# Patient Record
Sex: FEMALE | Race: WHITE | Employment: FULL TIME | URBAN - METROPOLITAN AREA
[De-identification: names, ages, dates, MRNs, and addresses within clinical notes are randomized per-mention and may not be internally consistent; named-entity substitution may affect disease eponyms.]

---

## 2018-05-13 ENCOUNTER — HOSPITAL ENCOUNTER (EMERGENCY)
Age: 28
Discharge: HOME | End: 2018-05-13
Attending: EMERGENCY MEDICINE
Payer: SELF-PAY

## 2018-05-13 VITALS
TEMPERATURE: 98.2 F | OXYGEN SATURATION: 98 % | BODY MASS INDEX: 29.99 KG/M2 | WEIGHT: 180 LBS | SYSTOLIC BLOOD PRESSURE: 131 MMHG | DIASTOLIC BLOOD PRESSURE: 82 MMHG | HEART RATE: 77 BPM | HEIGHT: 65 IN | RESPIRATION RATE: 22 BRPM

## 2018-05-13 DIAGNOSIS — J45.31 MILD PERSISTENT ASTHMA WITH EXACERBATION: Primary | ICD-10-CM

## 2018-05-13 DIAGNOSIS — Z76.0 MEDICATION REFILL: ICD-10-CM

## 2018-05-13 PROCEDURE — 94640 AIRWAY INHALATION TREATMENT: CPT

## 2018-05-13 PROCEDURE — 74011636637 HC RX REV CODE- 636/637: Performed by: EMERGENCY MEDICINE

## 2018-05-13 PROCEDURE — 77030029684 HC NEB SM VOL KT MONA -A

## 2018-05-13 PROCEDURE — 74011000250 HC RX REV CODE- 250: Performed by: EMERGENCY MEDICINE

## 2018-05-13 PROCEDURE — 74011250637 HC RX REV CODE- 250/637: Performed by: EMERGENCY MEDICINE

## 2018-05-13 PROCEDURE — 99283 EMERGENCY DEPT VISIT LOW MDM: CPT

## 2018-05-13 RX ORDER — IPRATROPIUM BROMIDE AND ALBUTEROL SULFATE 2.5; .5 MG/3ML; MG/3ML
3 SOLUTION RESPIRATORY (INHALATION)
Status: COMPLETED | OUTPATIENT
Start: 2018-05-13 | End: 2018-05-13

## 2018-05-13 RX ORDER — PREDNISONE 20 MG/1
60 TABLET ORAL DAILY
Qty: 12 TAB | Refills: 0 | Status: SHIPPED | OUTPATIENT
Start: 2018-05-13 | End: 2018-05-17

## 2018-05-13 RX ORDER — PREDNISONE 20 MG/1
60 TABLET ORAL
Status: COMPLETED | OUTPATIENT
Start: 2018-05-13 | End: 2018-05-13

## 2018-05-13 RX ORDER — ALBUTEROL SULFATE 90 UG/1
1-2 AEROSOL, METERED RESPIRATORY (INHALATION)
Qty: 1 INHALER | Refills: 0 | Status: ON HOLD | OUTPATIENT
Start: 2018-05-13 | End: 2019-10-21 | Stop reason: SDUPTHER

## 2018-05-13 RX ORDER — BUDESONIDE AND FORMOTEROL FUMARATE DIHYDRATE 160; 4.5 UG/1; UG/1
2 AEROSOL RESPIRATORY (INHALATION) 2 TIMES DAILY
Status: ON HOLD | COMMUNITY
End: 2019-10-21 | Stop reason: SDUPTHER

## 2018-05-13 RX ORDER — ALBUTEROL SULFATE 90 UG/1
1 AEROSOL, METERED RESPIRATORY (INHALATION)
Status: COMPLETED | OUTPATIENT
Start: 2018-05-13 | End: 2018-05-13

## 2018-05-13 RX ADMIN — ALBUTEROL SULFATE 1 PUFF: 90 AEROSOL, METERED RESPIRATORY (INHALATION) at 06:45

## 2018-05-13 RX ADMIN — PREDNISONE 60 MG: 20 TABLET ORAL at 06:13

## 2018-05-13 RX ADMIN — IPRATROPIUM BROMIDE AND ALBUTEROL SULFATE 3 ML: .5; 3 SOLUTION RESPIRATORY (INHALATION) at 06:13

## 2018-05-13 NOTE — ED PROVIDER NOTES
HPI Comments: Pt c/o wheezing, says woke up with it 5 hours pta. Long h/o asthma, freq attacks, last 2 weeks ago. No fever. No pain inc no chest pain. No leg pain or swelling. No abd pain. No vomiting. Says sx's mild, just needs refill. Says no chance of curr preg. Patient is a 28 y.o. female presenting with shortness of breath and wheezing. Shortness of Breath   Associated symptoms include wheezing. Pertinent negatives include no fever, no cough, no chest pain, no vomiting, no abdominal pain and no rash. Wheezing    Pertinent negatives include no chest pain, no fever, no abdominal pain, no vomiting, no cough and no rash. Past Medical History:   Diagnosis Date    Asthma     Ill-defined condition     ovarian cyst    Neurological disorder     migraines       Past Surgical History:   Procedure Laterality Date    HX GYN       x 2, btl         History reviewed. No pertinent family history. Social History     Social History    Marital status:      Spouse name: N/A    Number of children: N/A    Years of education: N/A     Occupational History    Not on file. Social History Main Topics    Smoking status: Former Smoker     Packs/day: 0.50    Smokeless tobacco: Never Used    Alcohol use Yes    Drug use: No    Sexual activity: Not on file     Other Topics Concern    Not on file     Social History Narrative         ALLERGIES: Ativan [lorazepam]    Review of Systems   Constitutional: Negative for fever. HENT: Negative for congestion. Respiratory: Positive for wheezing. Negative for cough. Cardiovascular: Negative for chest pain. Gastrointestinal: Negative for abdominal pain and vomiting. Musculoskeletal: Negative for back pain. Skin: Negative for rash. Neurological: Negative for light-headedness. All other systems reviewed and are negative.       Vitals:    18 0612 18 0613 18 0630 18 0635   BP:   131/82    Pulse:       Resp: Temp:       SpO2: 97% 97% 96% 98%   Weight:       Height:                Physical Exam   Constitutional: She is oriented to person, place, and time. She appears well-developed. HENT:   Head: Normocephalic. Mouth/Throat: Oropharynx is clear and moist.   Eyes: Pupils are equal, round, and reactive to light. Neck: Normal range of motion. Cardiovascular: Normal rate and normal heart sounds. No murmur heard. Pulmonary/Chest: Effort normal. She has wheezes (+ biphasic b/l wheezing. ). She has no rales. Abdominal: Soft. There is no tenderness. Musculoskeletal: Normal range of motion. She exhibits no edema. Neurological: She is alert and oriented to person, place, and time. Skin: Skin is warm and dry. Nursing note and vitals reviewed. [facility]      ED Course       Procedures      Vitals:  Patient Vitals for the past 12 hrs:   Temp Pulse Resp BP SpO2   05/13/18 0635 - - - - 98 %   05/13/18 0630 - - - 131/82 96 %   05/13/18 0613 - - - - 97 %   05/13/18 0612 - - - - 97 %   05/13/18 0602 - - - - 96 %   05/13/18 0600 - - - (!) 124/96 -   05/13/18 0558 98.2 °F (36.8 °C) 77 22 136/89 96 %         Medications ordered:   Medications   albuterol (PROVENTIL HFA, VENTOLIN HFA, PROAIR HFA) inhaler 1 Puff (not administered)   albuterol-ipratropium (DUO-NEB) 2.5 MG-0.5 MG/3 ML (3 mL Nebulization Given 5/13/18 0613)   predniSONE (DELTASONE) tablet 60 mg (60 mg Oral Given 5/13/18 8958)         Lab findings:  No results found for this or any previous visit (from the past 12 hour(s)). X-Ray, CT or other radiology findings or impressions:  No orders to display       Progress notes, Consult notes or additional Procedure notes:   Markedly improved on recheck, cont mild wheezing but improved aeration. Af, nl vitals, nl sats. Pt declines cxr and further ed tx inc further nebs. Adamant regarding dc at this time. Detailed ret inst given. To dc per pt request.  She verbalizes und of det ret inst given. Disposition:  Diagnosis:   1. Mild persistent asthma with exacerbation    2. Medication refill        Disposition: home    Follow-up Information     Follow up With Details Comments [address] Schedule an appointment as soon as possible for a visit in 1 [address]  [phone]           Patient's Medications   Start Taking    ALBUTEROL (PROVENTIL HFA, VENTOLIN HFA, PROAIR HFA) 90 MCG/ACTUATION INHALER    Take 1-2 Puffs by inhalation every four (4) hours as needed for Wheezing. PREDNISONE (DELTASONE) 20 MG TABLET    Take 3 Tabs by mouth daily for 4 days. Continue Taking    BUDESONIDE-FORMOTEROL (SYMBICORT) 160-4.5 MCG/ACTUATION HFAA    Take 2 Puffs by inhalation two (2) times a day. These Medications have changed    No medications on file   Stop Taking    ALBUTEROL (PROVENTIL HFA, VENTOLIN HFA) 90 MCG/ACTUATION INHALER    Take 2 Puffs by inhalation every six (6) hours as needed for Wheezing. ALBUTEROL (PROVENTIL HFA, VENTOLIN HFA) 90 MCG/ACTUATION INHALER    Take 2 Puffs by inhalation every four (4) hours as needed for Wheezing. FLUTICASONE-SALMETEROL (ADVAIR DISKUS) 100-50 MCG/DOSE DISKUS INHALER    Take 1 Puff by inhalation every twelve (12) hours.

## 2018-05-13 NOTE — ED NOTES
Medication teaching given on Duo neb and Prednisone. Patient verbalized understanding of and agrees with medication treatment. Encouraged patient to voice any concerns with reassurance provided.

## 2018-05-13 NOTE — DISCHARGE INSTRUCTIONS
Return for pain, any shortness of breath, vomiting, decreased fluid intake, weakness, numbness, dizziness, or any change or concerns. Asthma in Adults: Care Instructions  Your Care Instructions    During an asthma attack, your airways swell and narrow as a reaction to certain things (triggers). This makes it hard to breathe. You may be able to prevent asthma attacks if you avoid the things that set off your asthma symptoms. Keeping your asthma under control and treating symptoms before they get bad can help you avoid severe attacks. If you can control your asthma, you may be able to do all of your normal daily activities. You may also avoid asthma attacks and trips to the hospital.  Follow-up care is a key part of your treatment and safety. Be sure to make and go to all appointments, and call your doctor if you are having problems. It's also a good idea to know your test results and keep a list of the medicines you take. How can you care for yourself at home? · Follow your asthma action plan so you can manage your symptoms at home. An asthma action plan will help you prevent and control airway reactions and will tell you what to do during an asthma attack. If you do not have an asthma action plan, work with your doctor to build one. · Take your asthma medicine exactly as prescribed. Medicine plays an important role in controlling asthma. Talk to your doctor right away if you have any questions about what to take and how to take it. ¨ Use your quick-relief medicine when you have symptoms of an attack. Quick-relief medicine often is an albuterol inhaler. Some people need to use quick-relief medicine before they exercise. ¨ Take your controller medicine every day, not just when you have symptoms. Controller medicine is usually an inhaled corticosteroid. The goal is to prevent problems before they occur. Do not use your controller medicine to try to treat an attack that has already started.  It does not work fast enough to help. ¨ If your doctor prescribed corticosteroid pills to use during an attack, take them as directed. They may take hours to work, but they may shorten the attack and help you breathe better. ¨ Keep your quick-relief medicine with you at all times. · Talk to your doctor before using other medicines. Some medicines, such as aspirin, can cause asthma attacks in some people. · Check yourself for asthma symptoms to know which step to follow in your action plan. Watch for things like being short of breath, having chest tightness, coughing, and wheezing. Also notice if symptoms wake you up at night or if you get tired quickly when you exercise. · If you have a peak flow meter, use it to check how well you are breathing. This can help you predict when an asthma attack is going to occur. Then you can take medicine to prevent the asthma attack or make it less severe. · See your doctor regularly. These visits will help you learn more about asthma and what you can do to control it. Your doctor will monitor your treatment to make sure the medicine is helping you. · Keep track of your asthma attacks and your treatment. After you have had an attack, write down what triggered it, what helped end it, and any concerns you have about your asthma action plan. Take your diary when you see your doctor. You can then review your asthma action plan and decide if it is working. · Do not smoke or allow others to smoke around you. Avoid smoky places. Smoking makes asthma worse. If you need help quitting, talk to your doctor about stop-smoking programs and medicines. These can increase your chances of quitting for good. · Learn what triggers an asthma attack for you, and avoid the triggers when you can. Common triggers include colds, smoke, air pollution, dust, pollen, mold, pets, cockroaches, stress, and cold air. · Avoid colds and the flu. Get a pneumococcal vaccine shot.  If you have had one before, ask your doctor whether you need a second dose. Get a flu vaccine every fall. If you must be around people with colds or the flu, wash your hands often. When should you call for help? Call 911 anytime you think you may need emergency care. For example, call if:  ? · You have severe trouble breathing. ?Call your doctor now or seek immediate medical care if:  ? · Your symptoms do not get better after you have followed your asthma action plan. ? · You cough up yellow, dark brown, or bloody mucus (sputum). ? Watch closely for changes in your health, and be sure to contact your doctor if:  ? · Your coughing and wheezing get worse. ? · You need to use quick-relief medicine on more than 2 days a week (unless it is just for exercise). ? · You need help figuring out what is triggering your asthma attacks. Where can you learn more? Go to http://emilia-teresa.info/. Enter P597 in the search box to learn more about \"Asthma in Adults: Care Instructions. \"  Current as of: May 12, 2017  Content Version: 11.4  © 4918-9185 [employer]. Care instructions adapted under license by [employer] (which disclaims liability or warranty for this information). If you have questions about a medical condition or this instruction, always ask your healthcare professional. [address] any warranty or liability for your use of this information.

## 2018-05-13 NOTE — ED TRIAGE NOTES
Pt. Reports wheezing and asthma problem since 1 am this morning, she also reports a cough since 1 am. She states she has been having trouble breathing.

## 2018-05-13 NOTE — ED NOTES
Patient greeted / introduced myself as their primary nurse. Encouraged to voice any concerns, and all questions/concerns addressed. Assessment in progress. Explanation and teaching of all care given,  including any pending orders or procedures. Patient verbalized understanding of 'Plan of Care' and what the goals are (comfort goal reached). Vital signs, history, and home medications reviewed. Call bell with reach. Awaiting further MD orders at this time. Patient refusing chest xray and states \"I just want a breathing treatment\".

## 2019-06-27 ENCOUNTER — HOSPITAL ENCOUNTER (EMERGENCY)
Age: 29
Discharge: HOME | End: 2019-06-27
Attending: EMERGENCY MEDICINE
Payer: MEDICAID

## 2019-06-27 VITALS
SYSTOLIC BLOOD PRESSURE: 119 MMHG | BODY MASS INDEX: 28.32 KG/M2 | HEIGHT: 65 IN | HEART RATE: 87 BPM | RESPIRATION RATE: 18 BRPM | DIASTOLIC BLOOD PRESSURE: 74 MMHG | WEIGHT: 170 LBS | TEMPERATURE: 98.8 F | OXYGEN SATURATION: 100 %

## 2019-06-27 DIAGNOSIS — H92.03 PAIN IN EAR, BILATERAL: Primary | ICD-10-CM

## 2019-06-27 DIAGNOSIS — S00.412A ABRASION OF LEFT EAR CANAL, INITIAL ENCOUNTER: ICD-10-CM

## 2019-06-27 DIAGNOSIS — H61.23 BILATERAL IMPACTED CERUMEN: ICD-10-CM

## 2019-06-27 PROCEDURE — 74011250637 HC RX REV CODE- 250/637: Performed by: PHYSICIAN ASSISTANT

## 2019-06-27 PROCEDURE — 75810000150 HC RMVL IMPACTED CERUMEN 1 / 2

## 2019-06-27 PROCEDURE — 77030015919

## 2019-06-27 PROCEDURE — 99283 EMERGENCY DEPT VISIT LOW MDM: CPT

## 2019-06-27 RX ORDER — OFLOXACIN 3 MG/ML
5 SOLUTION AURICULAR (OTIC) DAILY
Qty: 5 ML | Refills: 0 | Status: SHIPPED | OUTPATIENT
Start: 2019-06-27 | End: 2019-08-14

## 2019-06-27 RX ORDER — IBUPROFEN 600 MG/1
TABLET ORAL
Status: DISCONTINUED
Start: 2019-06-27 | End: 2019-06-27 | Stop reason: HOSPADM

## 2019-06-27 RX ORDER — IBUPROFEN 600 MG/1
600 TABLET ORAL
Qty: 20 TAB | Refills: 0 | Status: SHIPPED | OUTPATIENT
Start: 2019-06-27 | End: 2019-08-14

## 2019-06-27 RX ORDER — IBUPROFEN 600 MG/1
600 TABLET ORAL
Status: COMPLETED | OUTPATIENT
Start: 2019-06-27 | End: 2019-06-27

## 2019-06-27 RX ADMIN — IBUPROFEN 600 MG: 600 TABLET ORAL at 01:38

## 2019-06-27 NOTE — DISCHARGE INSTRUCTIONS
Patient Education        Earache: Care Instructions  Your Care Instructions    Even though infection is a common cause of ear pain, not all ear pain means an infection. If you have ear pain and don't have an infection, it could be because of a jaw problem, such as temporomandibular joint (TMJ) pain. Or it could be because of a neck problem. When ear discomfort or pain is mild or comes and goes without other symptoms, home treatment may be all you need. Follow-up care is a key part of your treatment and safety. Be sure to make and go to all appointments, and call your doctor if you are having problems. It's also a good idea to know your test results and keep a list of the medicines you take. How can you care for yourself at home? · Apply heat on the ear to ease pain. To apply heat, put a warm water bottle, a heating pad set on low, or a warm cloth on your ear. Do not go to sleep with a heating pad on your skin. · Take an over-the-counter pain medicine, such as acetaminophen (Tylenol), ibuprofen (Advil, Motrin), or naproxen (Aleve). Be safe with medicines. Read and follow all instructions on the label. · Do not take two or more pain medicines at the same time unless the doctor told you to. Many pain medicines have acetaminophen, which is Tylenol. Too much acetaminophen (Tylenol) can be harmful. · Never insert anything, such as a cotton swab or a shirin pin, into the ear. When should you call for help? Call your doctor now or seek immediate medical care if:    · You have new or worse symptoms of infection, such as:  ? Increased pain, swelling, warmth, or redness. ? Red streaks leading from the area. ? Pus draining from the area. ? A fever.    Watch closely for changes in your health, and be sure to contact your doctor if:    · You have new or worse discharge coming from the ear.     · You do not get better as expected. Where can you learn more? Go to http://emilia-teresa.info/.   Enter C902 in the search box to learn more about \"Earache: Care Instructions. \"  Current as of: March 27, 2018  Content Version: 11.9  © 1266-9234 [employer]. Care instructions adapted under license by [employer] (which disclaims liability or warranty for this information). If you have questions about a medical condition or this instruction, always ask your healthcare professional. [address]  any warranty or liability for your use of this information. Patient Education        Earwax Blockage: Care Instructions  Your Care Instructions    Earwax is a natural substance that protects the ear canal. Normally, earwax drains from the ears and does not cause problems. Sometimes earwax builds up and hardens. Earwax blockage (also called cerumen impaction) can cause some loss of hearing and pain. When wax is tightly packed, you will need to have your doctor remove it. Follow-up care is a key part of your treatment and safety. Be sure to make and go to all appointments, and call your doctor if you are having problems. It's also a good idea to know your test results and keep a list of the medicines you take. How can you care for yourself at home? · Do not try to remove earwax with cotton swabs, fingers, or other objects. This can make the blockage worse and damage the eardrum. · If your doctor recommends that you try to remove earwax at home:  ? Soften and loosen the earwax with warm mineral oil. You also can try hydrogen peroxide mixed with an equal amount of room temperature water. Place 2 drops of the fluid, warmed to body temperature, in the ear two times a day for up to 5 days. ? Once the wax is loose and soft, all that is usually needed to remove it from the ear canal is a gentle, warm shower. Direct the water into the ear, then tip your head to let the earwax drain out. Dry your ear thoroughly with a hair dryer set on low.  Hold the dryer several inches from your ear.  ? If the warm mineral oil and shower do not work, use an over-the-counter wax softener. Read and follow all instructions on the label. After using the wax softener, use an ear syringe to gently flush the ear. Make sure the flushing solution is body temperature. Cool or hot fluids in the ear can cause dizziness. When should you call for help? Call your doctor now or seek immediate medical care if:    · Pus or blood drains from your ear.     · Your ears are ringing or feel full.     · You have a loss of hearing.    Watch closely for changes in your health, and be sure to contact your doctor if:    · You have pain or reduced hearing after 1 week of home treatment.     · You have any new symptoms, such as nausea or balance problems. Where can you learn more? Go to http://emilia-teresa.info/. Enter C306 in the search box to learn more about \"Earwax Blockage: Care Instructions. \"  Current as of: September 23, 2018  Content Version: 11.9  © 2374-8027 [employer]. Care instructions adapted under license by [employer] (which disclaims liability or warranty for this information). If you have questions about a medical condition or this instruction, always ask your healthcare professional. [place] [address] any warranty or liability for your use of this information.

## 2019-06-27 NOTE — ED PROVIDER NOTES
EMERGENCY DEPARTMENT HISTORY AND PHYSICAL EXAM    Date: 2019  Patient Name: Olman Faulkner    History of Presenting Illness     Time Seen:12:57 AM    No chief complaint on file. History Provided By: Patient    Additional History (Context):   Olman Faulkner is a 26 y.o. female presents emergency room with complaints of right ear pain. Pain is been going on for 4 days. Constant pain. Unable to sleep secondary to the pain. She complains of a lot of pressure in her ear. Similar issues in the past related to earwax. She is been using over-the-counter products to help resolve her earwax issues but continues to have problems. No bleeding or drainage from the ears. Afebrile. No nasal congestion or drainage. Decreased hearing. PCP: Kika Campbell MD    Current Outpatient Medications   Medication Sig Dispense Refill    ipratropium-albuterol (COMBIVENT RESPIMAT)  mcg/actuation inhaler Take 1 Puff by inhalation every six (6) hours.  ofloxacin (FLOXIN) 0.3 % otic solution Administer 5 Drops into each ear daily. 5 mL 0    ibuprofen (MOTRIN) 600 mg tablet Take 1 Tab by mouth every eight (8) hours as needed for Pain. Indications: Pain 20 Tab 0    budesonide-formoterol (SYMBICORT) 160-4.5 mcg/actuation HFAA Take 2 Puffs by inhalation two (2) times a day.  albuterol (PROVENTIL HFA, VENTOLIN HFA, PROAIR HFA) 90 mcg/actuation inhaler Take 1-2 Puffs by inhalation every four (4) hours as needed for Wheezing. 1 Inhaler 0       Past History     Past Medical History:  Past Medical History:   Diagnosis Date    Asthma     Ill-defined condition     ovarian cyst    Neurological disorder     migraines       Past Surgical History:  Past Surgical History:   Procedure Laterality Date    HX GYN       x 2, btl       Family History:  History reviewed. No pertinent family history.     Social History:  Social History     Tobacco Use    Smoking status: Current Every Day Smoker     Packs/day: 0.50    Smokeless tobacco: Never Used   Substance Use Topics    Alcohol use: Yes     Comment: rarely    Drug use: No       Allergies: Allergies   Allergen Reactions    Latex Rash    Ativan [Lorazepam] Other (comments)     Aggitation    Zoloft [Sertraline] Rash         Review of Systems   Review of Systems   HENT: Positive for ear pain and hearing loss. Negative for congestion, ear discharge, postnasal drip, rhinorrhea, sinus pressure, sinus pain, sore throat and tinnitus. Neurological: Negative for headaches. All other systems reviewed and are negative. Physical Exam     Vitals:    06/27/19 0039   BP: 119/74   Pulse: 87   Resp: 18   Temp: 98.8 °F (37.1 °C)   SpO2: 100%   Weight: 77.1 kg (170 lb)   Height: 5' 5\" (1.651 m)     Physical Exam   Constitutional: She is oriented to person, place, and time. She appears well-developed and well-nourished. She is cooperative. She does not appear ill. No distress. HENT:   Nose: Nose normal.   Mouth/Throat: Oropharynx is clear and moist and mucous membranes are normal.   Unable to visualize tympanic membranes bilaterally due to cerumen impaction. Able to visualize a hair in the ear canal of the right ear. Hair seems to be embedded or growing towards the beginning of the ear canal.    Patient does have some pain in the right ear with traction. There is no swelling externally. Left ear normal other than cerumen impaction. Eyes: Pupils are equal, round, and reactive to light. Conjunctivae are normal.   Neck: Neck supple. Cardiovascular: Normal rate. Pulmonary/Chest: Effort normal and breath sounds normal.   Lymphadenopathy:     She has no cervical adenopathy. Neurological: She is alert and oriented to person, place, and time. Skin: Skin is warm and dry. Psychiatric: She has a normal mood and affect. Nursing note and vitals reviewed.       Nursing note and vitals reviewed         Diagnostic Study Results     Labs -   No results found for this or any previous visit (from the past 12 hour(s)). Radiologic Studies   No orders to display     CT Results  (Last 48 hours)    None        CXR Results  (Last 48 hours)    None            Medical Decision Making   I am the first provider for this patient. I reviewed the vital signs, available nursing notes, past medical history, past surgical history, family history and social history. Vital Signs-Reviewed the patient's vital signs. Records Reviewed: Nursing Notes    DDX: Bilateral cerumen impaction, possible otitis externa, otitis media, ear canal irritation secondary to chronic medic [employer] use    Provider Notes:   26 y.o. female presents emergency room with right ear pain. On examination has bilateral ear cerumen impaction. Multiple attempts for irrigation were unsuccessful due to pain. Patient did suffer a small superficial abrasion to the left ear canal with irrigation. Also several attempts on both sides using cerumen scoop. Still able to remove large amounts of wax. Able to visualize the tympanic membrane on the right side. Hair still visible in the ear canal.  No signs of infection at the base of the hair. Unable to see tympanic membrane on the left ear due to cerumen impaction. Will not attempt any further removal.  Will refer on to ENT. Patient was given Motrin for pain. Procedures:  EAR CERUMEN REMOVAL NOTEWRITER (ASAP ONLY)  Date/Time: 6/27/2019 2:01 AM  Performed by: DARIA Wild  Authorized by: DARIA Wild     Consent:     Consent obtained:  Verbal    Consent given by:  Patient    Risks discussed:  Bleeding, infection, pain, TM perforation, dizziness and incomplete removal  Procedure details:     Location:  L ear and R ear    Procedure type: curette    Post-procedure details:     Hearing quality:  Diminished  Comments: The ears were first attempted to be irrigated with solution. However, patient was unable to tolerate.   She has an abrasion now to the left ear canal which could have been from the irrigation. Several attempts were done with cerumen scoop. Able to fully remove earwax on the right ear. However still unable to remove most of the wax on the left ear. Patient had too much discomfort so the procedure was terminated. ED Course:   Initial assessment performed. The patients presenting problems have been discussed, and they are in agreement with the care plan formulated and outlined with them. I have encouraged them to ask questions as they arise throughout their visit. Diagnosis and Disposition       DISCHARGE NOTE:  2:00 AM    Patricia Carrasquillo's  results have been reviewed with her. She has been counseled regarding her diagnosis, treatment, and plan. She verbally conveys understanding and agreement of the signs, symptoms, diagnosis, treatment and prognosis and additionally agrees to follow up as discussed. She also agrees with the care-plan and conveys that all of her questions have been answered. I have also provided discharge instructions for her that include: educational information regarding their diagnosis and treatment, and list of reasons why they would want to return to the ED prior to their follow-up appointment, should her condition change. She has been provided with education for proper emergency department utilization. CLINICAL IMPRESSION:    1. Pain in ear, bilateral    2. Bilateral impacted cerumen    3. Abrasion of left ear canal, initial encounter        PLAN:  1. D/C Home  2. Discharge Medication List as of 6/27/2019  1:53 AM      START taking these medications    Details   ofloxacin (FLOXIN) 0.3 % otic solution Administer 5 Drops into each ear daily. , Print, Disp-5 mL, R-0      ibuprofen (MOTRIN) 600 mg tablet Take 1 Tab by mouth every eight (8) hours as needed for Pain.  Indications: Pain, Print, Disp-20 Tab, R-0         CONTINUE these medications which have NOT CHANGED    Details   ipratropium-albuterol (COMBIVENT RESPIMAT)  mcg/actuation inhaler Take 1 Puff by inhalation every six (6) hours. , Historical Med      budesonide-formoterol (SYMBICORT) 160-4.5 mcg/actuation HFAA Take 2 Puffs by inhalation two (2) times a day., Historical Med      albuterol (PROVENTIL HFA, VENTOLIN HFA, PROAIR HFA) 90 mcg/actuation inhaler Take 1-2 Puffs by inhalation every four (4) hours as needed for Wheezing., Print, Disp-1 Inhaler, R-0           3. Follow-up Information     Follow up With Specialties Details Why Contact Info    Richard Salmeron MD Otolaryngology, Surgery Call in 1 day Call tomorrow for an appointment to be seen in the next several days to have earwax removed and ears reevaluated [address] [place] [address]      [facility] EMERGENCY DEPT Emergency Medicine  If symptoms worsen [address] [place] [phone]        ____________________________________     Please note that this dictation was completed with [employer], the computer voice recognition software. Quite often unanticipated grammatical, syntax, homophones, and other interpretive errors are inadvertently transcribed by the computer software. Please disregard these errors. Please excuse any errors that have escaped final proofreading.

## 2019-08-14 ENCOUNTER — APPOINTMENT (OUTPATIENT)
Dept: GENERAL RADIOLOGY | Age: 29
End: 2019-08-14
Attending: PHYSICIAN ASSISTANT
Payer: COMMERCIAL

## 2019-08-14 ENCOUNTER — HOSPITAL ENCOUNTER (EMERGENCY)
Age: 29
Discharge: HOME | End: 2019-08-14
Attending: EMERGENCY MEDICINE
Payer: COMMERCIAL

## 2019-08-14 VITALS
HEART RATE: 100 BPM | HEIGHT: 65 IN | BODY MASS INDEX: 28.32 KG/M2 | WEIGHT: 170 LBS | SYSTOLIC BLOOD PRESSURE: 112 MMHG | DIASTOLIC BLOOD PRESSURE: 86 MMHG | OXYGEN SATURATION: 100 % | TEMPERATURE: 98.5 F | RESPIRATION RATE: 20 BRPM

## 2019-08-14 DIAGNOSIS — J20.9 ACUTE BRONCHITIS, UNSPECIFIED ORGANISM: Primary | ICD-10-CM

## 2019-08-14 DIAGNOSIS — J45.901 ASTHMA WITH ACUTE EXACERBATION, UNSPECIFIED ASTHMA SEVERITY, UNSPECIFIED WHETHER PERSISTENT: ICD-10-CM

## 2019-08-14 PROCEDURE — 99283 EMERGENCY DEPT VISIT LOW MDM: CPT

## 2019-08-14 PROCEDURE — 94640 AIRWAY INHALATION TREATMENT: CPT

## 2019-08-14 PROCEDURE — 71046 X-RAY EXAM CHEST 2 VIEWS: CPT

## 2019-08-14 PROCEDURE — 74011636637 HC RX REV CODE- 636/637: Performed by: PHYSICIAN ASSISTANT

## 2019-08-14 PROCEDURE — 74011000250 HC RX REV CODE- 250: Performed by: PHYSICIAN ASSISTANT

## 2019-08-14 RX ORDER — PREDNISONE 20 MG/1
60 TABLET ORAL
Status: COMPLETED | OUTPATIENT
Start: 2019-08-14 | End: 2019-08-14

## 2019-08-14 RX ORDER — IPRATROPIUM BROMIDE AND ALBUTEROL SULFATE 2.5; .5 MG/3ML; MG/3ML
3 SOLUTION RESPIRATORY (INHALATION)
Status: COMPLETED | OUTPATIENT
Start: 2019-08-14 | End: 2019-08-14

## 2019-08-14 RX ORDER — PROMETHAZINE HYDROCHLORIDE, PHENYLEPHRINE HYDROCHLORIDE AND CODEINE PHOSPHATE 6.25; 5; 1 MG/5ML; MG/5ML; MG/5ML
5 SOLUTION ORAL
Qty: 120 ML | Refills: 0 | Status: SHIPPED | OUTPATIENT
Start: 2019-08-14 | End: 2019-08-17

## 2019-08-14 RX ORDER — PREDNISONE 10 MG/1
TABLET ORAL
Qty: 21 TAB | Refills: 0 | Status: SHIPPED | OUTPATIENT
Start: 2019-08-14 | End: 2019-09-09

## 2019-08-14 RX ADMIN — PREDNISONE 60 MG: 20 TABLET ORAL at 14:19

## 2019-08-14 RX ADMIN — IPRATROPIUM BROMIDE AND ALBUTEROL SULFATE 3 ML: .5; 3 SOLUTION RESPIRATORY (INHALATION) at 14:20

## 2019-08-14 NOTE — DISCHARGE INSTRUCTIONS
Patient Education        Bronchitis: Care Instructions  Your Care Instructions    Bronchitis is inflammation of the bronchial tubes, which carry air to the lungs. The tubes swell and produce mucus, or phlegm. The mucus and inflamed bronchial tubes make you cough. You may have trouble breathing. Most cases of bronchitis are caused by viruses like those that cause colds. Antibiotics usually do not help and they may be harmful. Bronchitis usually develops rapidly and lasts about 2 to 3 weeks in otherwise healthy people. Follow-up care is a key part of your treatment and safety. Be sure to make and go to all appointments, and call your doctor if you are having problems. It's also a good idea to know your test results and keep a list of the medicines you take. How can you care for yourself at home? · Take all medicines exactly as prescribed. Call your doctor if you think you are having a problem with your medicine. · Get some extra rest.  · Take an over-the-counter pain medicine, such as acetaminophen (Tylenol), ibuprofen (Advil, Motrin), or naproxen (Aleve) to reduce fever and relieve body aches. Read and follow all instructions on the label. · Do not take two or more pain medicines at the same time unless the doctor told you to. Many pain medicines have acetaminophen, which is Tylenol. Too much acetaminophen (Tylenol) can be harmful. · Take an over-the-counter cough medicine that contains dextromethorphan to help quiet a dry, hacking cough so that you can sleep. Avoid cough medicines that have more than one active ingredient. Read and follow all instructions on the label. · Breathe moist air from a humidifier, hot shower, or sink filled with hot water. The heat and moisture will thin mucus so you can cough it out. · Do not smoke. Smoking can make bronchitis worse. If you need help quitting, talk to your doctor about stop-smoking programs and medicines.  These can increase your chances of quitting for good.  When should you call for help? Call 911 anytime you think you may need emergency care. For example, call if:    · You have severe trouble breathing.    Call your doctor now or seek immediate medical care if:    · You have new or worse trouble breathing.     · You cough up dark brown or bloody mucus (sputum).     · You have a new or higher fever.     · You have a new rash.    Watch closely for changes in your health, and be sure to contact your doctor if:    · You cough more deeply or more often, especially if you notice more mucus or a change in the color of your mucus.     · You are not getting better as expected. Where can you learn more? Go to http://emilia-teresa.info/. Enter H333 in the search box to learn more about \"Bronchitis: Care Instructions. \"  Current as of: September 5, 2018  Content Version: 12.1  © 0882-2571 [employer]. Care instructions adapted under license by [employer] (which disclaims liability or warranty for this information). If you have questions about a medical condition or this instruction, always ask your healthcare professional. [address] any warranty or liability for your use of this information.

## 2019-08-14 NOTE — ED PROVIDER NOTES
EMERGENCY DEPARTMENT HISTORY AND PHYSICAL EXAM    Date: 2019  Patient Name: Kobi Cotter    History of Presenting Illness     Chief Complaint   Patient presents with    Cough         History Provided By: Patient    Kobi Cotter is a 26 y.o. female with PMHX of asthma who presents to the emergency department C/O cough. Associated sxs include wheezing, sneezing. Pt reprts long-standing history of asthma and seasonal allergies she takes Singulair and albuterol as needed. Over the last 3 days she has noticed increased cough congestion and wheezing that her albuterol does not seem to be relieving. Pt denies chest pain, fever, known sick contacts, and any other sxs or complaints. PCP: Will Witt MD    Current Outpatient Medications   Medication Sig Dispense Refill    predniSONE (STERAPRED DS) 10 mg dose pack Use as directed 21 Tab 0    promethazine-phenyleph-codeine (PHENERGAN VC WITH CODEINE) 6.25-5-10 mg/5 mL syrp Take 5 mL by mouth every six (6) hours as needed for Pain or Cough for up to 3 days. Max Daily Amount: 20 mL. 120 mL 0    ipratropium-albuterol (COMBIVENT RESPIMAT)  mcg/actuation inhaler Take 1 Puff by inhalation every six (6) hours.  budesonide-formoterol (SYMBICORT) 160-4.5 mcg/actuation HFAA Take 2 Puffs by inhalation two (2) times a day.  albuterol (PROVENTIL HFA, VENTOLIN HFA, PROAIR HFA) 90 mcg/actuation inhaler Take 1-2 Puffs by inhalation every four (4) hours as needed for Wheezing. 1 Inhaler 0       Past History     Past Medical History:  Past Medical History:   Diagnosis Date    Asthma     Ill-defined condition     ovarian cyst    Neurological disorder     migraines       Past Surgical History:  Past Surgical History:   Procedure Laterality Date    HX GYN       x 2, btl       Family History:  History reviewed. No pertinent family history.     Social History:  Social History     Tobacco Use    Smoking status: Current Every Day Smoker Packs/day: 0.50    Smokeless tobacco: Never Used   Substance Use Topics    Alcohol use: Yes     Comment: rarely    Drug use: No       Allergies: Allergies   Allergen Reactions    Latex Rash    Ativan [Lorazepam] Other (comments)     Aggitation    Zoloft [Sertraline] Rash         Review of Systems   Review of Systems   Constitutional: Negative for fever. HENT: Positive for congestion. Respiratory: Positive for cough and wheezing. Negative for shortness of breath. Cardiovascular: Negative for chest pain. All other systems reviewed and are negative. Physical Exam     Vitals:    08/14/19 1327   BP: 112/86   Pulse: 100   Resp: 20   Temp: 98.5 °F (36.9 °C)   SpO2: 100%   Weight: 77.1 kg (170 lb)   Height: 5' 5\" (1.651 m)     Physical Exam   Constitutional: She appears well-developed and well-nourished. No distress. Sitting in exam chair appears comfortable in no acute distress speaks in complete sentences   HENT:   Head: Normocephalic and atraumatic. Eyes: Pupils are equal, round, and reactive to light. Conjunctivae are normal.   Neck: Normal range of motion. Neck supple. Cardiovascular: Normal rate and regular rhythm. Pulmonary/Chest: Effort normal. No respiratory distress. She has wheezes. Musculoskeletal: Normal range of motion. Neurological: She is alert. Skin: Skin is warm and dry. Psychiatric: She has a normal mood and affect. Her behavior is normal.   Nursing note and vitals reviewed. Diagnostic Study Results     Labs -   No results found for this or any previous visit (from the past 12 hour(s)). Radiologic Studies -   XR CHEST PA LAT   Final Result   IMPRESSION:      No active cardiopulmonary disease. CT Results  (Last 48 hours)    None        CXR Results  (Last 48 hours)               08/14/19 1400  XR CHEST PA LAT Final result    Impression:  IMPRESSION:       No active cardiopulmonary disease.        Narrative:  EXAM: CHEST RADIOGRAPHS       CLINICAL INDICATION/HISTORY: cough     > Additional: None       COMPARISON: None       TECHNIQUE: Frontal and lateral views of the chest       _______________       FINDINGS:       HEART AND MEDIASTINUM: Heart size is normal.       LUNGS AND PLEURAL SPACES: No focal consolidation, effusion, or pneumothorax. BONES AND SOFT TISSUES: Unremarkable.       _______________                 Medications given in the ED-  Medications   albuterol-ipratropium (DUO-NEB) 2.5 MG-0.5 MG/3 ML (3 mL Nebulization Given 8/14/19 1420)   predniSONE (DELTASONE) tablet 60 mg (60 mg Oral Given 8/14/19 1419)         Medical Decision Making   I am the first provider for this patient. I reviewed the vital signs, available nursing notes, past medical history, past surgical history, family history and social history. Vital Signs-Reviewed the patient's vital signs. Pulse Oximetry Analysis - 100% on RA     Records Reviewed: Nursing Notes    Procedures:  Procedures    ED Course:   2:03 PM   Initial assessment performed. The patients presenting problems have been discussed, and they are in agreement with the care plan formulated and outlined with them. I have encouraged them to ask questions as they arise throughout their visit. Discussion: 26 y.o. female known history of asthma and seasonal allergies with 3-day history of coughing and increased wheezing. Patient is afebrile not hypoxic chest x-ray shows no acute process. Patient was better after neb and steroids plan for steroids and antitussives with PCP follow-up and strict return precautions discussed. Diagnosis and Disposition       DISCHARGE NOTE:  Patricia Carrasquillo's  results have been reviewed with her. She has been counseled regarding her diagnosis, treatment, and plan. She verbally conveys understanding and agreement of the signs, symptoms, diagnosis, treatment and prognosis and additionally agrees to follow up as discussed.   She also agrees with the care-plan and conveys that all of her questions have been answered. I have also provided discharge instructions for her that include: educational information regarding their diagnosis and treatment, and list of reasons why they would want to return to the ED prior to their follow-up appointment, should her condition change. She has been provided with education for proper emergency department utilization. CLINICAL IMPRESSION:    1. Acute bronchitis, unspecified organism    2. Asthma with acute exacerbation, unspecified asthma severity, unspecified whether persistent        PLAN:  1. D/C Home  2. Current Discharge Medication List      START taking these medications    Details   predniSONE (STERAPRED DS) 10 mg dose pack Use as directed  Qty: 21 Tab, Refills: 0      promethazine-phenyleph-codeine (PHENERGAN VC WITH CODEINE) 6.25-5-10 mg/5 mL syrp Take 5 mL by mouth every six (6) hours as needed for Pain or Cough for up to 3 days. Max Daily Amount: 20 mL. Qty: 120 mL, Refills: 0    Associated Diagnoses: Acute bronchitis, unspecified organism; Asthma with acute exacerbation, unspecified asthma severity, unspecified whether persistent           3. Follow-up Information     Follow up With Specialties Details Why Contact Info    Evelyn Monte MD Internal Medicine Schedule an appointment as soon as possible for a visit  [phone]  [phone]      [facility] EMERGENCY DEPT Emergency Medicine  As needed, If symptoms worsen [address] [id number]  [phone]              Please note that this dictation was completed with [employer], the computer voice recognition software. Quite often unanticipated grammatical, syntax, homophones, and other interpretive errors are inadvertently transcribed by the computer software. Please disregard these errors. Please excuse any errors that have escaped final proofreading.

## 2019-09-09 ENCOUNTER — HOSPITAL ENCOUNTER (EMERGENCY)
Age: 29
Discharge: HOME | End: 2019-09-09
Attending: EMERGENCY MEDICINE
Payer: COMMERCIAL

## 2019-09-09 VITALS
WEIGHT: 170 LBS | RESPIRATION RATE: 16 BRPM | BODY MASS INDEX: 28.29 KG/M2 | SYSTOLIC BLOOD PRESSURE: 117 MMHG | HEART RATE: 94 BPM | TEMPERATURE: 98.6 F | DIASTOLIC BLOOD PRESSURE: 81 MMHG | OXYGEN SATURATION: 98 %

## 2019-09-09 DIAGNOSIS — R51.9 RIGHT FACIAL PAIN: Primary | ICD-10-CM

## 2019-09-09 DIAGNOSIS — K08.89 DENTALGIA: ICD-10-CM

## 2019-09-09 DIAGNOSIS — K02.9 DENTAL CARIES: ICD-10-CM

## 2019-09-09 PROCEDURE — 99282 EMERGENCY DEPT VISIT SF MDM: CPT

## 2019-09-09 RX ORDER — TRAMADOL HYDROCHLORIDE 50 MG/1
50 TABLET ORAL
Qty: 10 TAB | Refills: 0 | Status: SHIPPED | OUTPATIENT
Start: 2019-09-09 | End: 2019-09-12

## 2019-09-09 RX ORDER — IBUPROFEN 800 MG/1
800 TABLET ORAL
Qty: 20 TAB | Refills: 0 | Status: SHIPPED | OUTPATIENT
Start: 2019-09-09 | End: 2019-09-16

## 2019-09-09 RX ORDER — PENICILLIN V POTASSIUM 500 MG/1
500 TABLET, FILM COATED ORAL 4 TIMES DAILY
Qty: 28 TAB | Refills: 0 | Status: SHIPPED | OUTPATIENT
Start: 2019-09-09 | End: 2019-09-12

## 2019-09-09 NOTE — ED PROVIDER NOTES
EMERGENCY DEPARTMENT HISTORY AND PHYSICAL EXAM    Date: 2019  Patient Name: Jj Cota    History of Presenting Illness     Chief Complaint   Patient presents with    Dental Pain         History Provided By: Patient    Chief Complaint: dental pain       Additional History (Context):   6:26 PM  Jj Cota is a 26 y.o. female  presents to the emergency department C/O right lower dental pain began last night. Patient reports that she corrected to 3 weeks ago. She has had some swelling to the area. No fevers. She is able to open her mouth without difficulty. States she has been calling and has not yet been able to get in with a dental clinic. PCP: Rose Marie Alford MD    Current Outpatient Medications   Medication Sig Dispense Refill    penicillin v potassium (VEETID) 500 mg tablet Take 1 Tab by mouth four (4) times daily for 7 days. 28 Tab 0    ibuprofen (MOTRIN) 800 mg tablet Take 1 Tab by mouth every six (6) hours as needed for Pain for up to 7 days. 20 Tab 0    traMADol (ULTRAM) 50 mg tablet Take 1 Tab by mouth every six (6) hours as needed for Pain for up to 3 days. Max Daily Amount: 200 mg. 10 Tab 0    ipratropium-albuterol (COMBIVENT RESPIMAT)  mcg/actuation inhaler Take 1 Puff by inhalation every six (6) hours.  budesonide-formoterol (SYMBICORT) 160-4.5 mcg/actuation HFAA Take 2 Puffs by inhalation two (2) times a day.  albuterol (PROVENTIL HFA, VENTOLIN HFA, PROAIR HFA) 90 mcg/actuation inhaler Take 1-2 Puffs by inhalation every four (4) hours as needed for Wheezing. 1 Inhaler 0       Past History     Past Medical History:  Past Medical History:   Diagnosis Date    Asthma     Ill-defined condition     ovarian cyst    Neurological disorder     migraines       Past Surgical History:  Past Surgical History:   Procedure Laterality Date    HX GYN       x 2, btl       Family History:  History reviewed. No pertinent family history.     Social History:  Social History     Tobacco Use    Smoking status: Current Every Day Smoker     Packs/day: 0.50    Smokeless tobacco: Never Used   Substance Use Topics    Alcohol use: Yes     Comment: rarely    Drug use: No       Allergies: Allergies   Allergen Reactions    Latex Rash    Ativan [Lorazepam] Other (comments)     Aggitation    Zoloft [Sertraline] Rash       Review of Systems   Review of Systems   Constitutional: Negative for chills and fever. HENT: Positive for dental problem. Respiratory: Negative for shortness of breath. Cardiovascular: Negative for chest pain. Neurological: Negative for weakness and numbness. All other systems reviewed and are negative. Physical Exam     Vitals:    09/09/19 1817   BP: 117/81   Pulse: 94   Resp: 16   Temp: 98.6 °F (37 °C)   SpO2: 98%   Weight: 77.1 kg (170 lb)     Physical Exam   Constitutional: She is oriented to person, place, and time. She appears well-developed and well-nourished. No distress. Alert, non toxic, appears slightly uncomfortable   HENT:   Head: Normocephalic and atraumatic. Right Ear: Tympanic membrane, external ear and ear canal normal.   Left Ear: Tympanic membrane, external ear and ear canal normal.   Nose: Nose normal.   Mouth/Throat: Uvula is midline, oropharynx is clear and moist and mucous membranes are normal. Mucous membranes are not dry. Abnormal dentition. No oropharyngeal exudate, posterior oropharyngeal edema, posterior oropharyngeal erythema or tonsillar abscesses. No sublingual tenderness or swelling  Able to swallow secretions  Able to open mouth fully  No trismus   No obvious facial swelling    Neck: Normal range of motion. Neck supple. Cardiovascular: Normal rate, regular rhythm and normal heart sounds. Pulmonary/Chest: Effort normal and breath sounds normal. No stridor. No respiratory distress. She has no wheezes. She has no rales. Lymphadenopathy:     She has no cervical adenopathy. Neurological: She is alert and oriented to person, place, and time. Skin: Skin is warm and dry. She is not diaphoretic. Psychiatric: She has a normal mood and affect. Judgment normal.   Nursing note and vitals reviewed. Diagnostic Study Results     Labs:   No results found for this or any previous visit (from the past 12 hour(s)). Radiologic Studies:   No orders to display     CT Results  (Last 48 hours)    None        CXR Results  (Last 48 hours)    None          Medical Decision Making   I am the first provider for this patient. I reviewed the vital signs, available nursing notes, past medical history, past surgical history, family history and social history. Vital Signs: Reviewed the patient's vital signs. Pulse Oximetry Analysis: 98% on RA       Records Reviewed: Nursing Notes and Old Medical Records    Procedures:  Procedures    ED Course:   6:26 PM Initial assessment performed. The patients presenting problems have been discussed, and they are in agreement with the care plan formulated and outlined with them. I have encouraged them to ask questions as they arise throughout their visit. Discussion:  Pt presents with right lower dental pain that began last night. She has fractured tooth. No evidence of abscess or Derrick's angina. Patient is able to open her mouth fully. Will start on penicillin and have patient follow-up with dental clinic. Strict return precautions given, pt offering no questions or complaints. Diagnosis and Disposition     DISCHARGE NOTE:  Patricia Carrasquillo's  results have been reviewed with her. She has been counseled regarding her diagnosis, treatment, and plan. She verbally conveys understanding and agreement of the signs, symptoms, diagnosis, treatment and prognosis and additionally agrees to follow up as discussed. She also agrees with the care-plan and conveys that all of her questions have been answered.   I have also provided discharge instructions for her that include: educational information regarding their diagnosis and treatment, and list of reasons why they would want to return to the ED prior to their follow-up appointment, should her condition change. She has been provided with education for proper emergency department utilization. CLINICAL IMPRESSION:    1. Right facial pain    2. Dental caries    3. Dentalgia        PLAN:  1. D/C Home  2. Current Discharge Medication List      START taking these medications    Details   penicillin v potassium (VEETID) 500 mg tablet Take 1 Tab by mouth four (4) times daily for 7 days. Qty: 28 Tab, Refills: 0      ibuprofen (MOTRIN) 800 mg tablet Take 1 Tab by mouth every six (6) hours as needed for Pain for up to 7 days. Qty: 20 Tab, Refills: 0      traMADol (ULTRAM) 50 mg tablet Take 1 Tab by mouth every six (6) hours as needed for Pain for up to 3 days. Max Daily Amount: 200 mg. Qty: 10 Tab, Refills: 0    Associated Diagnoses: Right facial pain; Dental caries; Dentalgia           3. Follow-up Information     Follow up With Specialties Details Why Contact Info    dental clinic    call for follow up      [facility] EMERGENCY DEPT Emergency Medicine  If symptoms worsen [address] [address] [phone]             Please note that this dictation was completed with [employer], the computer voice recognition software. Quite often unanticipated grammatical, syntax, homophones, and other interpretive errors are inadvertently transcribed by the computer software. Please disregard these errors. Please excuse any errors that have escaped final proofreading.

## 2019-09-09 NOTE — DISCHARGE INSTRUCTIONS
Dr. Romana Castanon, [facility] [address], [facility] 159  [address]:  330 [facility], [address]  [phone]  Melvi Byarvin, and Extractions    [facility]  [address] [facility], [address] [place]  [phone]  Melvi Byarvin, and Extractions    [facility]  [address] [facility] 159  [phone]  Fillings, Cleaning, and [address] [place]  [address] [place], 39[address] [facility]  [phone]    OFELIA KWOK [facility] Department  [address], [address]  [phone]  Ages 3-18, if attending [address], [address] [facility] [address]  [phone]  Extractions, Dany Castro, [facility]    [facility]  [address], [address]  [phone]  Oral Surgery - $70 required  Milo Gaw, Extractions - $100 Required    [address]   [address] [facility] [address], [address]  [phone]  [facility] Only    [address], 1519 [facility]  Dental Clinic Open September to June

## 2019-09-12 ENCOUNTER — HOSPITAL ENCOUNTER (EMERGENCY)
Age: 29
Discharge: HOME | End: 2019-09-12
Attending: EMERGENCY MEDICINE
Payer: COMMERCIAL

## 2019-09-12 VITALS
WEIGHT: 170 LBS | HEIGHT: 65 IN | TEMPERATURE: 98.5 F | RESPIRATION RATE: 18 BRPM | HEART RATE: 105 BPM | SYSTOLIC BLOOD PRESSURE: 149 MMHG | DIASTOLIC BLOOD PRESSURE: 113 MMHG | OXYGEN SATURATION: 100 % | BODY MASS INDEX: 28.32 KG/M2

## 2019-09-12 DIAGNOSIS — R51.9 RIGHT FACIAL PAIN: Primary | ICD-10-CM

## 2019-09-12 DIAGNOSIS — Z72.0 TOBACCO USE: ICD-10-CM

## 2019-09-12 DIAGNOSIS — K08.89 DENTALGIA: ICD-10-CM

## 2019-09-12 PROCEDURE — 99283 EMERGENCY DEPT VISIT LOW MDM: CPT

## 2019-09-12 PROCEDURE — 74011250637 HC RX REV CODE- 250/637: Performed by: PHYSICIAN ASSISTANT

## 2019-09-12 RX ORDER — CLINDAMYCIN HYDROCHLORIDE 150 MG/1
300 CAPSULE ORAL EVERY 6 HOURS
Qty: 80 CAP | Refills: 0 | Status: SHIPPED | OUTPATIENT
Start: 2019-09-12 | End: 2019-09-22

## 2019-09-12 RX ORDER — HYDROCODONE BITARTRATE AND ACETAMINOPHEN 5; 325 MG/1; MG/1
2 TABLET ORAL
Status: COMPLETED | OUTPATIENT
Start: 2019-09-12 | End: 2019-09-12

## 2019-09-12 RX ADMIN — HYDROCODONE BITARTRATE AND ACETAMINOPHEN 2 TABLET: 5; 325 TABLET ORAL at 18:57

## 2019-09-12 NOTE — ED PROVIDER NOTES
EMERGENCY DEPARTMENT HISTORY AND PHYSICAL EXAM    Date: 9/12/2019  Patient Name: Don Molina    History of Presenting Illness     Chief Complaint   Patient presents with    Dental Pain         History Provided By: Patient    Chief Complaint: right facial pain    HPI(Context):   6:43 PM  Don Molina is a 26 y.o. female with PMHX of tobacco use who presents to the emergency department C/O right facial pain. Associated sxs include right facial swelling. Pt reports chipped tooth at site of pain. Sxs x 4 days. Reports right lower jaw. Pt was seen at this facility for same this week. Rx PCN, tramadol, and motrin. No relief. Pt seen at [place] [id number] ED for same. Told to continue medicine and see dentist. Pt has not called dentist as she cannot find one that takes her insurance. Pt is active smoker. Pt denies fever, chills, and any other sxs or complaints. PCP: Favio Duff MD    Current Facility-Administered Medications   Medication Dose Route Frequency Provider Last Rate Last Dose    HYDROcodone-acetaminophen (NORCO) 5-325 mg per tablet 2 Tab  2 Tab Oral NOW Denver Stokes PA-C         Current Outpatient Medications   Medication Sig Dispense Refill    clindamycin (CLEOCIN) 150 mg capsule Take 2 Caps by mouth every six (6) hours for 10 days. 80 Cap 0    ibuprofen (MOTRIN) 800 mg tablet Take 1 Tab by mouth every six (6) hours as needed for Pain for up to 7 days. 20 Tab 0    traMADol (ULTRAM) 50 mg tablet Take 1 Tab by mouth every six (6) hours as needed for Pain for up to 3 days. Max Daily Amount: 200 mg. 10 Tab 0    ipratropium-albuterol (COMBIVENT RESPIMAT)  mcg/actuation inhaler Take 1 Puff by inhalation every six (6) hours.  budesonide-formoterol (SYMBICORT) 160-4.5 mcg/actuation HFAA Take 2 Puffs by inhalation two (2) times a day.  albuterol (PROVENTIL HFA, VENTOLIN HFA, PROAIR HFA) 90 mcg/actuation inhaler Take 1-2 Puffs by inhalation every four (4) hours as needed for Wheezing.  1 Inhaler 0       Past History     Past Medical History:  Past Medical History:   Diagnosis Date    Asthma     Ill-defined condition     ovarian cyst    Neurological disorder     migraines       Past Surgical History:  Past Surgical History:   Procedure Laterality Date    HX GYN       x 2, btl       Family History:  History reviewed. No pertinent family history. Social History:  Social History     Tobacco Use    Smoking status: Current Every Day Smoker     Packs/day: 0.50    Smokeless tobacco: Never Used   Substance Use Topics    Alcohol use: Yes     Comment: rarely    Drug use: No       Allergies: Allergies   Allergen Reactions    Latex Rash    Ativan [Lorazepam] Other (comments)     Aggitation    Zoloft [Sertraline] Rash         Review of Systems   Review of Systems   Constitutional: Negative for chills and fever. HENT: Positive for dental problem and facial swelling. Gastrointestinal: Negative for nausea and vomiting. All other systems reviewed and are negative. Physical Exam     Vitals:    19 1754   BP: (!) 149/113   Pulse: (!) 105   Resp: 18   Temp: 98.5 °F (36.9 °C)   SpO2: 100%   Weight: 77.1 kg (170 lb)   Height: 5' 5\" (1.651 m)     Physical Exam   Constitutional: She is oriented to person, place, and time. She appears well-developed and well-nourished. No distress.  female in NAD. Alert. Anxious and tearful. HENT:   Head: Normocephalic and atraumatic. Right Ear: External ear normal.   Left Ear: External ear normal.   Nose: Nose normal. No mucosal edema or rhinorrhea. Mouth/Throat: Uvula is midline, oropharynx is clear and moist and mucous membranes are normal. No oral lesions. No trismus in the jaw. Dental caries present. No dental abscesses or uvula swelling. No oropharyngeal exudate, posterior oropharyngeal edema, posterior oropharyngeal erythema or tonsillar abscesses. Eyes: Conjunctivae are normal.   Neck: Normal range of motion. Cardiovascular: Normal rate, regular rhythm, normal heart sounds and intact distal pulses. Exam reveals no gallop and no friction rub. No murmur heard. Pulmonary/Chest: Effort normal and breath sounds normal. No accessory muscle usage. No tachypnea. She has no decreased breath sounds. She has no rhonchi. Musculoskeletal: Normal range of motion. Neurological: She is alert and oriented to person, place, and time. Skin: Skin is warm and dry. She is not diaphoretic. Psychiatric: She has a normal mood and affect. Judgment normal.   Nursing note and vitals reviewed. Diagnostic Study Results     Labs -   No results found for this or any previous visit (from the past 12 hour(s)). No orders to display     CT Results  (Last 48 hours)    None        CXR Results  (Last 48 hours)    None          Medications given in the ED-  Medications   HYDROcodone-acetaminophen (NORCO) 5-325 mg per tablet 2 Tab (has no administration in time range)         Medical Decision Making   I am the first provider for this patient. I reviewed the vital signs, available nursing notes, past medical history, past surgical history, family history and social history. Vital Signs-Reviewed the patient's vital signs. Pulse Oximetry Analysis - 100% on RA     Records Reviewed: Nursing Notes    Provider Notes (Medical Decision Making): dental caries, dental fx, dental abscess, TMJ, sinusitis, OM    Procedures:  Procedures    ED Course:   6:43 PM Initial assessment performed. The patients presenting problems have been discussed, and they are in agreement with the care plan formulated and outlined with them. I have encouraged them to ask questions as they arise throughout their visit. Diagnosis and Disposition       No drainable abscess. Stop PCN. Start clinda. FU with dentist. Alva Michel oral surgery FU as well. Smoking cessation discussed. Reasons to RTED discussed with pt. All questions answered.  Pt feels comfortable going home at this time. Pt expressed understanding and she agrees with plan. SMOKING CESSATION:  6:47 PM   The patient was counseled on the dangers of tobacco use, and was advised to quit. Reviewed strategies to maximize success, including removing cigarettes and smoking materials from environment and written materials. Discussion took 3-5 minutes, and pt expressed understanding. 1. Right facial pain    2. Dentalgia    3. Tobacco use        PLAN:  1. D/C Home  2. Current Discharge Medication List      START taking these medications    Details   clindamycin (CLEOCIN) 150 mg capsule Take 2 Caps by mouth every six (6) hours for 10 days. Qty: 80 Cap, Refills: 0         STOP taking these medications       penicillin v potassium (VEETID) 500 mg tablet Comments:   Reason for Stopping:             3.   Follow-up Information     Follow up With Specialties Details Why Contact Info    Roxy Webster DMD Oral Surgery   [address] [id number] [address] EMERGENCY DEPT Emergency Medicine  As needed, If symptoms worsen [address] [place] [address] [phone]        _______________________________    Attestations: This note is prepared by Pina Reyez PA-C.  _______________________________      Please note that this dictation was completed with [employer], the computer voice recognition software. Quite often unanticipated grammatical, syntax, homophones, and other interpretive errors are inadvertently transcribed by the computer software. Please disregard these errors. Please excuse any errors that have escaped final proofreading.

## 2019-09-12 NOTE — DISCHARGE INSTRUCTIONS
Pt. Advised of A/P and the need to follow-up with the Dentist      Dr. Mc Bone, [address] [facility] 159  [address]:  330 [facility], [address]  [phone]  Lisa Beery, and Extractions    [facility]  [address] [facility], [address] [place]  [phone]  Lisa Beery, and Extractions    [facility]  [address] [facility] [address]  [phone]  Fillings, Cleaning, and [address] [place]  [address] [place], 3947 [facility]  [phone]    OFELIA JOSIE [facility] Department  7501 [facility] [address] [facility], [address]  [phone]  Ages 3-18, if attending [facility]  [address] [facility], [address]  [phone]  Extractions, Dany Castro, [facility]    [facility] [employer] [place]  [address], [address] [facility] [address]  [phone]  Oral Surgery - $70 required  Raphael Figueroa, Extractions - $100 Required    [address]   [address] [facility] [address], [address]  [phone]  [facility] Only    [address], 1519 [facility]  Dental Clinic Open September to June

## 2019-09-15 ENCOUNTER — HOSPITAL ENCOUNTER (EMERGENCY)
Age: 29
Discharge: HOME | End: 2019-09-15
Attending: EMERGENCY MEDICINE | Admitting: EMERGENCY MEDICINE
Payer: COMMERCIAL

## 2019-09-15 VITALS
SYSTOLIC BLOOD PRESSURE: 126 MMHG | HEIGHT: 65 IN | HEART RATE: 103 BPM | TEMPERATURE: 98.3 F | BODY MASS INDEX: 28.32 KG/M2 | RESPIRATION RATE: 14 BRPM | DIASTOLIC BLOOD PRESSURE: 76 MMHG | OXYGEN SATURATION: 100 % | WEIGHT: 170 LBS

## 2019-09-15 DIAGNOSIS — K04.7 DENTAL INFECTION: ICD-10-CM

## 2019-09-15 DIAGNOSIS — R19.7 DIARRHEA, UNSPECIFIED TYPE: ICD-10-CM

## 2019-09-15 DIAGNOSIS — R11.2 NAUSEA AND VOMITING, INTRACTABILITY OF VOMITING NOT SPECIFIED, UNSPECIFIED VOMITING TYPE: Primary | ICD-10-CM

## 2019-09-15 DIAGNOSIS — R79.89 ELEVATED SERUM CREATININE: ICD-10-CM

## 2019-09-15 LAB
ALBUMIN SERPL-MCNC: 3.8 G/DL (ref 3.4–5)
ALBUMIN/GLOB SERPL: 1.2 {RATIO} (ref 0.8–1.7)
ALP SERPL-CCNC: 60 U/L (ref 45–117)
ALT SERPL-CCNC: 14 U/L (ref 13–56)
ANION GAP SERPL CALC-SCNC: 9 MMOL/L (ref 3–18)
APPEARANCE UR: ABNORMAL
AST SERPL-CCNC: 11 U/L (ref 10–38)
BACTERIA URNS QL MICRO: ABNORMAL /HPF
BASOPHILS # BLD: 0 K/UL (ref 0–0.1)
BASOPHILS NFR BLD: 0 % (ref 0–2)
BILIRUB SERPL-MCNC: 0.7 MG/DL (ref 0.2–1)
BILIRUB UR QL: NEGATIVE
BUN SERPL-MCNC: 28 MG/DL (ref 7–18)
BUN/CREAT SERPL: 16 (ref 12–20)
CALCIUM SERPL-MCNC: 9.2 MG/DL (ref 8.5–10.1)
CHLORIDE SERPL-SCNC: 108 MMOL/L (ref 100–111)
CO2 SERPL-SCNC: 24 MMOL/L (ref 21–32)
COLOR UR: YELLOW
CREAT SERPL-MCNC: 1.73 MG/DL (ref 0.6–1.3)
DIFFERENTIAL METHOD BLD: ABNORMAL
EOSINOPHIL # BLD: 0.2 K/UL (ref 0–0.4)
EOSINOPHIL NFR BLD: 1 % (ref 0–5)
EPITH CASTS URNS QL MICRO: ABNORMAL /LPF (ref 0–5)
ERYTHROCYTE [DISTWIDTH] IN BLOOD BY AUTOMATED COUNT: 12.7 % (ref 11.6–14.5)
GLOBULIN SER CALC-MCNC: 3.3 G/DL (ref 2–4)
GLUCOSE SERPL-MCNC: 91 MG/DL (ref 74–99)
GLUCOSE UR STRIP.AUTO-MCNC: NEGATIVE MG/DL
HCG UR QL: NEGATIVE
HCT VFR BLD AUTO: 45.9 % (ref 35–45)
HGB BLD-MCNC: 16.7 G/DL (ref 12–16)
HGB UR QL STRIP: ABNORMAL
KETONES UR QL STRIP.AUTO: ABNORMAL MG/DL
LEUKOCYTE ESTERASE UR QL STRIP.AUTO: NEGATIVE
LIPASE SERPL-CCNC: 35 U/L (ref 73–393)
LYMPHOCYTES # BLD: 1.9 K/UL (ref 0.9–3.6)
LYMPHOCYTES NFR BLD: 17 % (ref 21–52)
MCH RBC QN AUTO: 31.4 PG (ref 24–34)
MCHC RBC AUTO-ENTMCNC: 36.4 G/DL (ref 31–37)
MCV RBC AUTO: 86.3 FL (ref 74–97)
MONOCYTES # BLD: 1.1 K/UL (ref 0.05–1.2)
MONOCYTES NFR BLD: 10 % (ref 3–10)
NEUTS SEG # BLD: 8 K/UL (ref 1.8–8)
NEUTS SEG NFR BLD: 72 % (ref 40–73)
NITRITE UR QL STRIP.AUTO: NEGATIVE
PH UR STRIP: 5 [PH] (ref 5–8)
PLATELET # BLD AUTO: 292 K/UL (ref 135–420)
PMV BLD AUTO: 9.8 FL (ref 9.2–11.8)
POTASSIUM SERPL-SCNC: 3.8 MMOL/L (ref 3.5–5.5)
PROT SERPL-MCNC: 7.1 G/DL (ref 6.4–8.2)
PROT UR STRIP-MCNC: 300 MG/DL
RBC # BLD AUTO: 5.32 M/UL (ref 4.2–5.3)
RBC #/AREA URNS HPF: ABNORMAL /HPF (ref 0–5)
SODIUM SERPL-SCNC: 141 MMOL/L (ref 136–145)
SP GR UR REFRACTOMETRY: 1.02 (ref 1–1.03)
UROBILINOGEN UR QL STRIP.AUTO: 0.2 EU/DL (ref 0.2–1)
WBC # BLD AUTO: 11.2 K/UL (ref 4.6–13.2)
WBC URNS QL MICRO: ABNORMAL /HPF (ref 0–5)

## 2019-09-15 PROCEDURE — 96375 TX/PRO/DX INJ NEW DRUG ADDON: CPT

## 2019-09-15 PROCEDURE — 96361 HYDRATE IV INFUSION ADD-ON: CPT

## 2019-09-15 PROCEDURE — 74011250636 HC RX REV CODE- 250/636: Performed by: PHYSICIAN ASSISTANT

## 2019-09-15 PROCEDURE — 99283 EMERGENCY DEPT VISIT LOW MDM: CPT

## 2019-09-15 PROCEDURE — 83690 ASSAY OF LIPASE: CPT

## 2019-09-15 PROCEDURE — 81025 URINE PREGNANCY TEST: CPT

## 2019-09-15 PROCEDURE — 96365 THER/PROPH/DIAG IV INF INIT: CPT

## 2019-09-15 PROCEDURE — 85025 COMPLETE CBC W/AUTO DIFF WBC: CPT

## 2019-09-15 PROCEDURE — 80053 COMPREHEN METABOLIC PANEL: CPT

## 2019-09-15 PROCEDURE — 81001 URINALYSIS AUTO W/SCOPE: CPT

## 2019-09-15 RX ORDER — PENICILLIN V POTASSIUM 500 MG/1
500 TABLET, FILM COATED ORAL 4 TIMES DAILY
Qty: 28 TAB | Refills: 0 | Status: SHIPPED | OUTPATIENT
Start: 2019-09-15 | End: 2019-09-22

## 2019-09-15 RX ORDER — ONDANSETRON 2 MG/ML
4 INJECTION INTRAMUSCULAR; INTRAVENOUS
Status: COMPLETED | OUTPATIENT
Start: 2019-09-15 | End: 2019-09-15

## 2019-09-15 RX ORDER — ONDANSETRON 4 MG/1
4 TABLET, ORALLY DISINTEGRATING ORAL
Qty: 20 TAB | Refills: 0 | Status: SHIPPED | OUTPATIENT
Start: 2019-09-15 | End: 2020-02-04

## 2019-09-15 RX ORDER — CLINDAMYCIN PHOSPHATE 900 MG/50ML
900 INJECTION, SOLUTION INTRAVENOUS
Status: COMPLETED | OUTPATIENT
Start: 2019-09-15 | End: 2019-09-15

## 2019-09-15 RX ADMIN — SODIUM CHLORIDE 1000 ML: 900 INJECTION, SOLUTION INTRAVENOUS at 21:26

## 2019-09-15 RX ADMIN — CLINDAMYCIN PHOSPHATE 900 MG: 900 INJECTION, SOLUTION INTRAVENOUS at 21:26

## 2019-09-15 RX ADMIN — ONDANSETRON 4 MG: 2 INJECTION INTRAMUSCULAR; INTRAVENOUS at 21:26

## 2019-09-16 NOTE — DISCHARGE INSTRUCTIONS
Diarrhea: Care Instructions  Your Care Instructions    Diarrhea is loose, watery stools (bowel movements). The exact cause is often hard to find. Sometimes diarrhea is your body's way of getting rid of what caused an upset stomach. Viruses, food poisoning, and many medicines can cause diarrhea. Some people get diarrhea in response to emotional stress, anxiety, or certain foods. Almost everyone has diarrhea now and then. It usually isn't serious, and your stools will return to normal soon. The important thing to do is replace the fluids you have lost, so you can prevent dehydration. The doctor has checked you carefully, but problems can develop later. If you notice any problems or new symptoms, get medical treatment right away. Follow-up care is a key part of your treatment and safety. Be sure to make and go to all appointments, and call your doctor if you are having problems. It's also a good idea to know your test results and keep a list of the medicines you take. How can you care for yourself at home? · Watch for signs of dehydration, which means your body has lost too much water. Dehydration is a serious condition and should be treated right away. Signs of dehydration are:  ? Increasing thirst and dry eyes and mouth. ? Feeling faint or lightheaded. ? Darker urine, and a smaller amount of urine than normal.  · To prevent dehydration, drink plenty of fluids, enough so that your urine is light yellow or clear like water. Choose water and other caffeine-free clear liquids until you feel better. If you have kidney, heart, or liver disease and have to limit fluids, talk with your doctor before you increase the amount of fluids you drink. · Begin eating small amounts of mild foods the next day, if you feel like it. ? Try yogurt that has live cultures of Lactobacillus. (Check the label.)  ? Avoid spicy foods, fruits, alcohol, and caffeine until 48 hours after all symptoms are gone. ?  Avoid chewing gum that contains sorbitol. ? Avoid dairy products (except for yogurt with Lactobacillus) while you have diarrhea and for 3 days after symptoms are gone. · The doctor may recommend that you take over-the-counter medicine, such as loperamide (Imodium), if you still have diarrhea after 6 hours. Read and follow all instructions on the label. Do not use this medicine if you have bloody diarrhea, a high fever, or other signs of serious illness. Call your doctor if you think you are having a problem with your medicine. When should you call for help? Call 911 anytime you think you may need emergency care. For example, call if:    · You passed out (lost consciousness).     · Your stools are maroon or very bloody.    Call your doctor now or seek immediate medical care if:    · You are dizzy or lightheaded, or you feel like you may faint.     · Your stools are black and look like tar, or they have streaks of blood.     · You have new or worse belly pain.     · You have symptoms of dehydration, such as:  ? Dry eyes and a dry mouth. ? Passing only a little dark urine. ? Feeling thirstier than usual.     · You have a new or higher fever.    Watch closely for changes in your health, and be sure to contact your doctor if:    · Your diarrhea is getting worse.     · You see pus in the diarrhea.     · You are not getting better after 2 days (48 hours). Where can you learn more? Go to http://emilia-teresa.info/. Enter Z013 in the search box to learn more about \"Diarrhea: Care Instructions. \"  Current as of: September 23, 2018  Content Version: 12.1  © 5459-8606 [employer]. Care instructions adapted under license by [employer] (which disclaims liability or warranty for this information). If you have questions about a medical condition or this instruction, always ask your healthcare professional. [address] any warranty or liability for your use of this information. Nausea and Vomiting: Care Instructions  Your Care Instructions    When you are nauseated, you may feel weak and sweaty and notice a lot of saliva in your mouth. Nausea often leads to vomiting. Most of the time you do not need to worry about nausea and vomiting, but they can be signs of other illnesses. Two common causes of nausea and vomiting are stomach flu and food poisoning. Nausea and vomiting from viral stomach flu will usually start to improve within 24 hours. Nausea and vomiting from food poisoning may last from 12 to 48 hours. The doctor has checked you carefully, but problems can develop later. If you notice any problems or new symptoms, get medical treatment right away. Follow-up care is a key part of your treatment and safety. Be sure to make and go to all appointments, and call your doctor if you are having problems. It's also a good idea to know your test results and keep a list of the medicines you take. How can you care for yourself at home? · To prevent dehydration, drink plenty of fluids, enough so that your urine is light yellow or clear like water. Choose water and other caffeine-free clear liquids until you feel better. If you have kidney, heart, or liver disease and have to limit fluids, talk with your doctor before you increase the amount of fluids you drink. · Rest in bed until you feel better. · When you are able to eat, try clear soups, mild foods, and liquids until all symptoms are gone for 12 to 48 hours. Other good choices include dry toast, crackers, cooked cereal, and gelatin dessert, such as Jell-O. When should you call for help? Call 911 anytime you think you may need emergency care. For example, call if:    · You passed out (lost consciousness).    Call your doctor now or seek immediate medical care if:    · You have symptoms of dehydration, such as:  ? Dry eyes and a dry mouth. ? Passing only a little dark urine. ?  Feeling thirstier than usual.     · You have new or worsening belly pain.     · You have a new or higher fever.     · You vomit blood or what looks like coffee grounds.    Watch closely for changes in your health, and be sure to contact your doctor if:    · You have ongoing nausea and vomiting.     · Your vomiting is getting worse.     · Your vomiting lasts longer than 2 days.     · You are not getting better as expected. Where can you learn more? Go to http://emilia-teresa.info/. Enter 25 [id number] in the search box to learn more about \"Nausea and Vomiting: Care Instructions. \"  Current as of: September 23, 2018  Content Version: 12.1  © 6923-2544 [employer], [employer]. Care instructions adapted under license by [employer] (which disclaims liability or warranty for this information). If you have questions about a medical condition or this instruction, always ask your healthcare professional. [address] any warranty or liability for your use of this information.

## 2019-09-16 NOTE — ED NOTES
Provider  verbalized discharge information to pt, pt verbalized understanding. Pt in NAD at time of departure.

## 2019-09-16 NOTE — ED PROVIDER NOTES
EMERGENCY DEPARTMENT HISTORY AND PHYSICAL EXAM    Date: 9/15/2019  Patient Name: Kaitlin Foreman    History of Presenting Illness     Chief Complaint   Patient presents with    Nausea    Diarrhea       History Provided By: Patient    Chief Complaint:  Vomiting     Additional History (Context):   8:20 PM  Kaitlin Foreman is a 26 y.o. female with PMHX dental infection  presents to the emergency department C/O vomiting since yesterday and diarrhea for the past 3 hours. She is also complaining of some slight lower abdominal cramping. She has not had any fevers, urinary symptoms or any other symptoms. She does have a history of cholecystectomy. Patient was recently seen several times for dental pain in the past week. She was initially started on penicillin but had persistent swelling and pain and was switched to clindamycin which she had one day of before she began vomiting. She denies any other medical problems. PCP: Marvin Baumann MD    Current Outpatient Medications   Medication Sig Dispense Refill    ondansetron (ZOFRAN ODT) 4 mg disintegrating tablet Take 1 Tab by mouth every eight (8) hours as needed for Nausea. 20 Tab 0    penicillin v potassium (VEETID) 500 mg tablet Take 1 Tab by mouth four (4) times daily for 7 days. 28 Tab 0    clindamycin (CLEOCIN) 150 mg capsule Take 2 Caps by mouth every six (6) hours for 10 days. 80 Cap 0    ibuprofen (MOTRIN) 800 mg tablet Take 1 Tab by mouth every six (6) hours as needed for Pain for up to 7 days. 20 Tab 0    ipratropium-albuterol (COMBIVENT RESPIMAT)  mcg/actuation inhaler Take 1 Puff by inhalation every six (6) hours.  budesonide-formoterol (SYMBICORT) 160-4.5 mcg/actuation HFAA Take 2 Puffs by inhalation two (2) times a day.  albuterol (PROVENTIL HFA, VENTOLIN HFA, PROAIR HFA) 90 mcg/actuation inhaler Take 1-2 Puffs by inhalation every four (4) hours as needed for Wheezing.  1 Inhaler 0       Past History     Past Medical History:  Past Medical History:   Diagnosis Date    Asthma     Ill-defined condition     ovarian cyst    Neurological disorder     migraines       Past Surgical History:  Past Surgical History:   Procedure Laterality Date    HX GYN       x 2, btl       Family History:  History reviewed. No pertinent family history. Social History:  Social History     Tobacco Use    Smoking status: Current Every Day Smoker     Packs/day: 0.50    Smokeless tobacco: Never Used   Substance Use Topics    Alcohol use: Yes     Comment: rarely    Drug use: No       Allergies: Allergies   Allergen Reactions    Latex Rash    Ativan [Lorazepam] Other (comments)     Aggitation    Phenergan [Promethazine] Rash    Zoloft [Sertraline] Rash       Review of Systems   Review of Systems   Constitutional: Negative for chills and fever. Respiratory: Negative for shortness of breath. Cardiovascular: Negative for chest pain. Gastrointestinal: Negative for abdominal pain, diarrhea, nausea and vomiting. Genitourinary: Negative for decreased urine volume, dysuria, flank pain, frequency, hematuria, pelvic pain and urgency. Musculoskeletal: Negative for back pain. Skin: Negative for wound. Neurological: Negative for weakness and numbness. All other systems reviewed and are negative. Physical Exam     Vitals:    09/15/19 2010 09/15/19 2131 09/15/19 2234   BP: 114/85  126/76   Pulse: (!) 103     Resp: 14     Temp: 98.3 °F (36.8 °C)     SpO2: 100% 100%    Weight: 77.1 kg (170 lb)     Height: 5' 5\" (1.651 m)       Physical Exam   Constitutional: She is oriented to person, place, and time. She appears well-developed and well-nourished. No distress. Alert, nontoxic, no acute distress   HENT:   Head: Normocephalic and atraumatic.    Right Ear: Tympanic membrane, external ear and ear canal normal.   Left Ear: Tympanic membrane, external ear and ear canal normal.   Nose: Nose normal.   Mouth/Throat: Uvula is midline, oropharynx is clear and moist and mucous membranes are normal. Mucous membranes are not dry. Abnormal dentition. Dental caries present. No dental abscesses. No oropharyngeal exudate, posterior oropharyngeal edema, posterior oropharyngeal erythema or tonsillar abscesses. No sublingual tenderness or swelling  Able to swallow secretions  Able to open mouth fully  No trismus   No obvious facial swelling    Neck: Normal range of motion. Neck supple. Cardiovascular: Normal rate, regular rhythm, normal heart sounds and intact distal pulses. No murmur heard. Pulmonary/Chest: Effort normal and breath sounds normal. No stridor. No respiratory distress. She has no wheezes. She has no rales. Abdominal: Soft. Bowel sounds are normal. She exhibits no distension. There is no hepatosplenomegaly. There is tenderness in the suprapubic area. There is no rebound, no guarding and no CVA tenderness. Lymphadenopathy:     She has no cervical adenopathy. Neurological: She is alert and oriented to person, place, and time. Skin: Skin is warm and dry. She is not diaphoretic. Psychiatric: She has a normal mood and affect. Judgment normal.   Nursing note and vitals reviewed.       Diagnostic Study Results     Labs:     Recent Results (from the past 12 hour(s))   URINALYSIS W/ RFLX MICROSCOPIC    Collection Time: 09/15/19  8:15 PM   Result Value Ref Range    Color YELLOW      Appearance CLOUDY      Specific gravity 1.017 1.005 - 1.030      pH (UA) 5.0 5.0 - 8.0      Protein 300 (A) NEG mg/dL    Glucose NEGATIVE  NEG mg/dL    Ketone TRACE (A) NEG mg/dL    Bilirubin NEGATIVE  NEG      Blood SMALL (A) NEG      Urobilinogen 0.2 0.2 - 1.0 EU/dL    Nitrites NEGATIVE  NEG      Leukocyte Esterase NEGATIVE  NEG     URINE MICROSCOPIC ONLY    Collection Time: 09/15/19  8:15 PM   Result Value Ref Range    WBC 5 to 10 0 - 5 /hpf    RBC 0 to 3 0 - 5 /hpf    Epithelial cells 1+ 0 - 5 /lpf    Bacteria FEW (A) NEG /hpf   CBC WITH AUTOMATED DIFF    Collection Time: 09/15/19  9:20 PM   Result Value Ref Range    WBC 11.2 4.6 - 13.2 K/uL    RBC 5.32 (H) 4.20 - 5.30 M/uL    HGB 16.7 (H) 12.0 - 16.0 g/dL    HCT 45.9 (H) 35.0 - 45.0 %    MCV 86.3 74.0 - 97.0 FL    MCH 31.4 24.0 - 34.0 PG    MCHC 36.4 31.0 - 37.0 g/dL    RDW 12.7 11.6 - 14.5 %    PLATELET 822 288 - 510 K/uL    MPV 9.8 9.2 - 11.8 FL    NEUTROPHILS 72 40 - 73 %    LYMPHOCYTES 17 (L) 21 - 52 %    MONOCYTES 10 3 - 10 %    EOSINOPHILS 1 0 - 5 %    BASOPHILS 0 0 - 2 %    ABS. NEUTROPHILS 8.0 1.8 - 8.0 K/UL    ABS. LYMPHOCYTES 1.9 0.9 - 3.6 K/UL    ABS. MONOCYTES 1.1 0.05 - 1.2 K/UL    ABS. EOSINOPHILS 0.2 0.0 - 0.4 K/UL    ABS. BASOPHILS 0.0 0.0 - 0.1 K/UL    DF AUTOMATED     METABOLIC PANEL, COMPREHENSIVE    Collection Time: 09/15/19  9:20 PM   Result Value Ref Range    Sodium 141 136 - 145 mmol/L    Potassium 3.8 3.5 - 5.5 mmol/L    Chloride 108 100 - 111 mmol/L    CO2 24 21 - 32 mmol/L    Anion gap 9 3.0 - 18 mmol/L    Glucose 91 74 - 99 mg/dL    BUN 28 (H) 7.0 - 18 MG/DL    Creatinine 1.73 (H) 0.6 - 1.3 MG/DL    BUN/Creatinine ratio 16 12 - 20      GFR est AA 42 (L) >60 ml/min/1.73m2    GFR est non-AA 35 (L) >60 ml/min/1.73m2    Calcium 9.2 8.5 - 10.1 MG/DL    Bilirubin, total 0.7 0.2 - 1.0 MG/DL    ALT (SGPT) 14 13 - 56 U/L    AST (SGOT) 11 10 - 38 U/L    Alk. phosphatase 60 45 - 117 U/L    Protein, total 7.1 6.4 - 8.2 g/dL    Albumin 3.8 3.4 - 5.0 g/dL    Globulin 3.3 2.0 - 4.0 g/dL    A-G Ratio 1.2 0.8 - 1.7     LIPASE    Collection Time: 09/15/19  9:20 PM   Result Value Ref Range    Lipase 35 (L) 73 - 393 U/L   HCG URINE, QL. - POC    Collection Time: 09/15/19  9:30 PM   Result Value Ref Range    Pregnancy test,urine (POC) NEGATIVE  NEG         Radiologic Studies:   No orders to display     CT Results  (Last 48 hours)    None        CXR Results  (Last 48 hours)    None          Medical Decision Making   I am the first provider for this patient.     I reviewed the vital signs, available nursing notes, past medical history, past surgical history, family history and social history. Vital Signs: Reviewed the patient's vital signs. Pulse Oximetry Analysis: 100% on RA     Records Reviewed: Nursing Notes and Old Medical Records    Procedures:  Procedures    ED Course:   8:20 PM Initial assessment performed. The patients presenting problems have been discussed, and they are in agreement with the care plan formulated and outlined with them. I have encouraged them to ask questions as they arise throughout their visit. Patient requesting to drink, when RN told her she is still n.p.o. patient states she is going to drink anyway. Discussion:  Pt presents with nausea and vomiting that started yesterday with 3 episodes of diarrhea today. She was recently started on clindamycin for dental infection. Labs within normal limits with the exception of slightly elevated creatinine which is likely due to dehydration. Patient was unable to give stool sample in the ED, doubt C. difficile. Patient is requesting to be switched back to penicillin as she does not like the side effects of clindamycin and does not want the increased risk of C. difficile. Will give penicillin and have patient follow-up with dental clinic. Specific signs and symptoms for C. difficile explained and instructed patient to return if she starts experiencing any. Also advised patient to increase fluid intake and follow-up with PCP for creatinine recheck. Strict return precautions given, pt offering no questions or complaints. Diagnosis and Disposition     DISCHARGE NOTE:  Patricia Carrasquillo's  results have been reviewed with her. She has been counseled regarding her diagnosis, treatment, and plan. She verbally conveys understanding and agreement of the signs, symptoms, diagnosis, treatment and prognosis and additionally agrees to follow up as discussed.   She also agrees with the care-plan and conveys that all of her questions have been answered. I have also provided discharge instructions for her that include: educational information regarding their diagnosis and treatment, and list of reasons why they would want to return to the ED prior to their follow-up appointment, should her condition change. She has been provided with education for proper emergency department utilization. CLINICAL IMPRESSION:    1. Nausea and vomiting, intractability of vomiting not specified, unspecified vomiting type    2. Diarrhea, unspecified type    3. Dental infection    4. Elevated serum creatinine        PLAN:  1. D/C Home  2. Discharge Medication List as of 9/15/2019 10:39 PM      CONTINUE these medications which have NOT CHANGED    Details   clindamycin (CLEOCIN) 150 mg capsule Take 2 Caps by mouth every six (6) hours for 10 days. , Print, Disp-80 Cap, R-0      ibuprofen (MOTRIN) 800 mg tablet Take 1 Tab by mouth every six (6) hours as needed for Pain for up to 7 days. , Print, Disp-20 Tab, R-0      ipratropium-albuterol (COMBIVENT RESPIMAT)  mcg/actuation inhaler Take 1 Puff by inhalation every six (6) hours. , Historical Med      budesonide-formoterol (SYMBICORT) 160-4.5 mcg/actuation HFAA Take 2 Puffs by inhalation two (2) times a day., Historical Med      albuterol (PROVENTIL HFA, VENTOLIN HFA, PROAIR HFA) 90 mcg/actuation inhaler Take 1-2 Puffs by inhalation every four (4) hours as needed for Wheezing., Print, Disp-1 Inhaler, R-0           3. Follow-up Information     Follow up With Specialties Details Why Contact Info    Terrell Bustos MD Internal Medicine  Follow-up for recheck of your kidney function [address] [facility] [address] [phone]      [facility] EMERGENCY DEPT Emergency Medicine  If symptoms worsen [address]  [address] [id number] [phone]          Please note that this dictation was completed with [employer], the [employer] voice recognition software.   Quite often unanticipated grammatical, syntax, homophones, and other interpretive errors are inadvertently transcribed by the computer software. Please disregard these errors. Please excuse any errors that have escaped final proofreading.

## 2019-10-20 ENCOUNTER — HOSPITAL ENCOUNTER (INPATIENT)
Age: 29
LOS: 1 days | Discharge: LEFT BEFORE DISCHARGE | DRG: 141 | End: 2019-10-21
Attending: EMERGENCY MEDICINE | Admitting: FAMILY MEDICINE
Payer: COMMERCIAL

## 2019-10-20 ENCOUNTER — APPOINTMENT (OUTPATIENT)
Dept: GENERAL RADIOLOGY | Age: 29
DRG: 141 | End: 2019-10-20
Attending: EMERGENCY MEDICINE
Payer: COMMERCIAL

## 2019-10-20 DIAGNOSIS — R09.02 HYPOXIA: ICD-10-CM

## 2019-10-20 DIAGNOSIS — J20.9 ACUTE BRONCHITIS, UNSPECIFIED ORGANISM: ICD-10-CM

## 2019-10-20 DIAGNOSIS — F17.210 CIGARETTE NICOTINE DEPENDENCE WITHOUT COMPLICATION: Primary | ICD-10-CM

## 2019-10-20 DIAGNOSIS — J45.41 MODERATE PERSISTENT ASTHMA WITH ACUTE EXACERBATION: ICD-10-CM

## 2019-10-20 PROBLEM — J40 BRONCHITIS: Status: ACTIVE | Noted: 2019-10-20

## 2019-10-20 PROBLEM — J45.901 ASTHMA EXACERBATION: Status: ACTIVE | Noted: 2019-10-20

## 2019-10-20 PROCEDURE — 74011000250 HC RX REV CODE- 250

## 2019-10-20 PROCEDURE — 74011250636 HC RX REV CODE- 250/636: Performed by: EMERGENCY MEDICINE

## 2019-10-20 PROCEDURE — 94640 AIRWAY INHALATION TREATMENT: CPT

## 2019-10-20 PROCEDURE — 96375 TX/PRO/DX INJ NEW DRUG ADDON: CPT

## 2019-10-20 PROCEDURE — 71045 X-RAY EXAM CHEST 1 VIEW: CPT

## 2019-10-20 PROCEDURE — 65270000029 HC RM PRIVATE

## 2019-10-20 PROCEDURE — 96365 THER/PROPH/DIAG IV INF INIT: CPT

## 2019-10-20 PROCEDURE — 74011000250 HC RX REV CODE- 250: Performed by: EMERGENCY MEDICINE

## 2019-10-20 PROCEDURE — 77030013140 HC MSK NEB VYRM -A

## 2019-10-20 PROCEDURE — 99284 EMERGENCY DEPT VISIT MOD MDM: CPT

## 2019-10-20 RX ORDER — IPRATROPIUM BROMIDE AND ALBUTEROL SULFATE 2.5; .5 MG/3ML; MG/3ML
3 SOLUTION RESPIRATORY (INHALATION)
Status: COMPLETED | OUTPATIENT
Start: 2019-10-20 | End: 2019-10-20

## 2019-10-20 RX ORDER — IPRATROPIUM BROMIDE AND ALBUTEROL SULFATE 2.5; .5 MG/3ML; MG/3ML
SOLUTION RESPIRATORY (INHALATION)
Status: COMPLETED
Start: 2019-10-20 | End: 2019-10-20

## 2019-10-20 RX ORDER — ALBUTEROL SULFATE 2.5 MG/.5ML
5 SOLUTION RESPIRATORY (INHALATION)
Status: COMPLETED | OUTPATIENT
Start: 2019-10-20 | End: 2019-10-20

## 2019-10-20 RX ORDER — MAGNESIUM SULFATE 1 G/100ML
1 INJECTION INTRAVENOUS ONCE
Status: COMPLETED | OUTPATIENT
Start: 2019-10-20 | End: 2019-10-20

## 2019-10-20 RX ADMIN — MAGNESIUM SULFATE HEPTAHYDRATE 1 G: 1 INJECTION, SOLUTION INTRAVENOUS at 21:53

## 2019-10-20 RX ADMIN — IPRATROPIUM BROMIDE AND ALBUTEROL SULFATE 3 ML: .5; 3 SOLUTION RESPIRATORY (INHALATION) at 21:49

## 2019-10-20 RX ADMIN — CEFTRIAXONE 2 G: 2 INJECTION, POWDER, FOR SOLUTION INTRAMUSCULAR; INTRAVENOUS at 23:27

## 2019-10-20 RX ADMIN — IPRATROPIUM BROMIDE AND ALBUTEROL SULFATE 3 ML: .5; 3 SOLUTION RESPIRATORY (INHALATION) at 22:48

## 2019-10-20 RX ADMIN — METHYLPREDNISOLONE SODIUM SUCCINATE 125 MG: 125 INJECTION, POWDER, FOR SOLUTION INTRAMUSCULAR; INTRAVENOUS at 21:53

## 2019-10-20 RX ADMIN — IPRATROPIUM BROMIDE AND ALBUTEROL SULFATE 3 ML: 2.5; .5 SOLUTION RESPIRATORY (INHALATION) at 21:49

## 2019-10-20 RX ADMIN — AZITHROMYCIN MONOHYDRATE 500 MG: 500 INJECTION, POWDER, LYOPHILIZED, FOR SOLUTION INTRAVENOUS at 23:28

## 2019-10-20 RX ADMIN — ALBUTEROL SULFATE 5 MG: 2.5 SOLUTION RESPIRATORY (INHALATION) at 23:27

## 2019-10-21 VITALS
HEIGHT: 67 IN | DIASTOLIC BLOOD PRESSURE: 68 MMHG | TEMPERATURE: 97.8 F | BODY MASS INDEX: 26.68 KG/M2 | HEART RATE: 117 BPM | OXYGEN SATURATION: 91 % | RESPIRATION RATE: 18 BRPM | WEIGHT: 170 LBS | SYSTOLIC BLOOD PRESSURE: 124 MMHG

## 2019-10-21 LAB
ANION GAP SERPL CALC-SCNC: 9 MMOL/L (ref 3–18)
BUN SERPL-MCNC: 10 MG/DL (ref 7–18)
BUN/CREAT SERPL: 13 (ref 12–20)
CALCIUM SERPL-MCNC: 8.7 MG/DL (ref 8.5–10.1)
CHLORIDE SERPL-SCNC: 108 MMOL/L (ref 100–111)
CO2 SERPL-SCNC: 21 MMOL/L (ref 21–32)
CREAT SERPL-MCNC: 0.78 MG/DL (ref 0.6–1.3)
ERYTHROCYTE [DISTWIDTH] IN BLOOD BY AUTOMATED COUNT: 12.9 % (ref 11.6–14.5)
GLUCOSE SERPL-MCNC: 155 MG/DL (ref 74–99)
HCT VFR BLD AUTO: 42.3 % (ref 35–45)
HGB BLD-MCNC: 14.8 G/DL (ref 12–16)
MCH RBC QN AUTO: 30.9 PG (ref 24–34)
MCHC RBC AUTO-ENTMCNC: 35 G/DL (ref 31–37)
MCV RBC AUTO: 88.3 FL (ref 74–97)
PLATELET # BLD AUTO: 268 K/UL (ref 135–420)
PMV BLD AUTO: 9.5 FL (ref 9.2–11.8)
POTASSIUM SERPL-SCNC: 4.3 MMOL/L (ref 3.5–5.5)
RBC # BLD AUTO: 4.79 M/UL (ref 4.2–5.3)
SODIUM SERPL-SCNC: 138 MMOL/L (ref 136–145)
WBC # BLD AUTO: 12.3 K/UL (ref 4.6–13.2)

## 2019-10-21 PROCEDURE — 74011250636 HC RX REV CODE- 250/636: Performed by: FAMILY MEDICINE

## 2019-10-21 PROCEDURE — 80048 BASIC METABOLIC PNL TOTAL CA: CPT

## 2019-10-21 PROCEDURE — 36415 COLL VENOUS BLD VENIPUNCTURE: CPT

## 2019-10-21 PROCEDURE — 74011250637 HC RX REV CODE- 250/637: Performed by: FAMILY MEDICINE

## 2019-10-21 PROCEDURE — 74011000250 HC RX REV CODE- 250: Performed by: FAMILY MEDICINE

## 2019-10-21 PROCEDURE — 74011250636 HC RX REV CODE- 250/636: Performed by: HOSPITALIST

## 2019-10-21 PROCEDURE — 85027 COMPLETE CBC AUTOMATED: CPT

## 2019-10-21 PROCEDURE — 77030027138 HC INCENT SPIROMETER -A

## 2019-10-21 PROCEDURE — 94640 AIRWAY INHALATION TREATMENT: CPT

## 2019-10-21 RX ORDER — HEPARIN SODIUM 5000 [USP'U]/ML
5000 INJECTION, SOLUTION INTRAVENOUS; SUBCUTANEOUS EVERY 8 HOURS
Status: DISCONTINUED | OUTPATIENT
Start: 2019-10-21 | End: 2019-10-21 | Stop reason: HOSPADM

## 2019-10-21 RX ORDER — PREDNISONE 20 MG/1
TABLET ORAL
Qty: 18 TAB | Refills: 0 | Status: SHIPPED | OUTPATIENT
Start: 2019-10-21 | End: 2020-02-04

## 2019-10-21 RX ORDER — ALBUTEROL SULFATE 90 UG/1
2 AEROSOL, METERED RESPIRATORY (INHALATION)
Qty: 1 INHALER | Refills: 0 | Status: SHIPPED | OUTPATIENT
Start: 2019-10-21 | End: 2020-02-04

## 2019-10-21 RX ORDER — AMOXICILLIN AND CLAVULANATE POTASSIUM 875; 125 MG/1; MG/1
1 TABLET, FILM COATED ORAL 2 TIMES DAILY
Qty: 14 TAB | Refills: 0 | Status: SHIPPED | OUTPATIENT
Start: 2019-10-21 | End: 2020-02-04

## 2019-10-21 RX ORDER — IPRATROPIUM BROMIDE AND ALBUTEROL SULFATE 2.5; .5 MG/3ML; MG/3ML
3 SOLUTION RESPIRATORY (INHALATION)
Status: DISCONTINUED | OUTPATIENT
Start: 2019-10-21 | End: 2019-10-21 | Stop reason: HOSPADM

## 2019-10-21 RX ORDER — MONTELUKAST SODIUM 10 MG/1
10 TABLET ORAL
Status: DISCONTINUED | OUTPATIENT
Start: 2019-10-21 | End: 2019-10-21 | Stop reason: HOSPADM

## 2019-10-21 RX ORDER — ACETAMINOPHEN 325 MG/1
650 TABLET ORAL
Status: DISCONTINUED | OUTPATIENT
Start: 2019-10-21 | End: 2019-10-21 | Stop reason: HOSPADM

## 2019-10-21 RX ORDER — IPRATROPIUM BROMIDE AND ALBUTEROL SULFATE 2.5; .5 MG/3ML; MG/3ML
3 SOLUTION RESPIRATORY (INHALATION)
Qty: 30 NEBULE | Refills: 1 | Status: SHIPPED | OUTPATIENT
Start: 2019-10-21 | End: 2020-02-04

## 2019-10-21 RX ORDER — BUDESONIDE AND FORMOTEROL FUMARATE DIHYDRATE 160; 4.5 UG/1; UG/1
2 AEROSOL RESPIRATORY (INHALATION) 2 TIMES DAILY
Qty: 1 INHALER | Refills: 1 | OUTPATIENT
Start: 2019-10-21 | End: 2019-12-29

## 2019-10-21 RX ORDER — IBUPROFEN 200 MG
1 TABLET ORAL DAILY
Status: DISCONTINUED | OUTPATIENT
Start: 2019-10-21 | End: 2019-10-21 | Stop reason: HOSPADM

## 2019-10-21 RX ORDER — BUDESONIDE 0.5 MG/2ML
500 INHALANT ORAL
Status: DISCONTINUED | OUTPATIENT
Start: 2019-10-21 | End: 2019-10-21 | Stop reason: HOSPADM

## 2019-10-21 RX ORDER — HYDROCODONE BITARTRATE AND ACETAMINOPHEN 5; 325 MG/1; MG/1
1 TABLET ORAL
Status: DISCONTINUED | OUTPATIENT
Start: 2019-10-21 | End: 2019-10-21 | Stop reason: HOSPADM

## 2019-10-21 RX ADMIN — HEPARIN SODIUM 5000 UNITS: 5000 INJECTION INTRAVENOUS; SUBCUTANEOUS at 01:32

## 2019-10-21 RX ADMIN — METHYLPREDNISOLONE SODIUM SUCCINATE 60 MG: 40 INJECTION, POWDER, FOR SOLUTION INTRAMUSCULAR; INTRAVENOUS at 01:31

## 2019-10-21 RX ADMIN — MONTELUKAST 10 MG: 10 TABLET, FILM COATED ORAL at 01:32

## 2019-10-21 RX ADMIN — AZITHROMYCIN MONOHYDRATE 500 MG: 500 INJECTION, POWDER, LYOPHILIZED, FOR SOLUTION INTRAVENOUS at 10:48

## 2019-10-21 RX ADMIN — IPRATROPIUM BROMIDE AND ALBUTEROL SULFATE 3 ML: .5; 3 SOLUTION RESPIRATORY (INHALATION) at 11:26

## 2019-10-21 RX ADMIN — HEPARIN SODIUM 5000 UNITS: 5000 INJECTION INTRAVENOUS; SUBCUTANEOUS at 10:48

## 2019-10-21 RX ADMIN — METHYLPREDNISOLONE SODIUM SUCCINATE 60 MG: 40 INJECTION, POWDER, FOR SOLUTION INTRAMUSCULAR; INTRAVENOUS at 07:04

## 2019-10-21 RX ADMIN — SODIUM CHLORIDE 1000 ML: 900 INJECTION, SOLUTION INTRAVENOUS at 10:49

## 2019-10-21 RX ADMIN — IPRATROPIUM BROMIDE AND ALBUTEROL SULFATE 3 ML: .5; 3 SOLUTION RESPIRATORY (INHALATION) at 07:28

## 2019-10-21 NOTE — PROGRESS NOTES
Hospitalist Progress Note    Patient: Shari Jimenez MRN: [id number]  CSN: [id number]    YOB: 1990  Age: 26 y.o. Sex: female    DOA: 10/20/2019 LOS:  LOS: 1 day       update from nurse  Patient declined to walk test in pratt  Tele monitor states 02 sats are 84% at times    Pateint is going to sign out AMA  I recommended she stay as her 02 sats are 84% this afternoon on RA and this is abnormal and tells me she is in respiratory illness that requires acute inpatient treatment and oxygen    She states she feels fine, is leaving, and can do better at home    I told her it could become life threatening and be a danger to her life if she does not complete her treatment here  She states she knows and she \"just wants to go home. \"    I Cancelled d/c order  I will give her the rx already made but she has to sign AMA for this non-recommended discharge  Sailaja Zuniga MD

## 2019-10-21 NOTE — ED PROVIDER NOTES
EMERGENCY DEPARTMENT HISTORY AND PHYSICAL EXAM    Date: 10/20/2019  Patient Name: Angelita Dumont    History of Presenting Illness     Chief Complaint   Patient presents with    Wheezing         History Provided By: Patient    Additional History (Context):   Angelita Dumont is a 26 y.o. female with PMHX asthma, nicotine dependence presents to the emergency department C/O shortness of breath that started today. Patient also reports cough with greenish sputum. States that she used her albuterol treatment with minimal relief. Pt denies chest pain, nausea, vomiting, fever, abdominal pain, and any other sxs or complaints. PCP: Jacob Campbell MD    Current Facility-Administered Medications   Medication Dose Route Frequency Provider Last Rate Last Dose    cefTRIAXone (ROCEPHIN) 2 g in sterile water (preservative free) 20 mL IV syringe  2 g IntraVENous Q24H Shefali Leyva DO        azithromycin (ZITHROMAX) 500 mg in 0.9% sodium chloride 250 mL (VIAL-MATE)  500 mg IntraVENous Q24H Shefali Leyva DO        albuterol CONCENTRATE 2.5mg/0.5 mL neb soln  5 mg Nebulization NOW Karl LeyvasDO         Current Outpatient Medications   Medication Sig Dispense Refill    ondansetron (ZOFRAN ODT) 4 mg disintegrating tablet Take 1 Tab by mouth every eight (8) hours as needed for Nausea. 20 Tab 0    ipratropium-albuterol (COMBIVENT RESPIMAT)  mcg/actuation inhaler Take 1 Puff by inhalation every six (6) hours.  budesonide-formoterol (SYMBICORT) 160-4.5 mcg/actuation HFAA Take 2 Puffs by inhalation two (2) times a day.  albuterol (PROVENTIL HFA, VENTOLIN HFA, PROAIR HFA) 90 mcg/actuation inhaler Take 1-2 Puffs by inhalation every four (4) hours as needed for Wheezing.  1 Inhaler 0       Past History     Past Medical History:  Past Medical History:   Diagnosis Date    Asthma     Ill-defined condition     ovarian cyst    Neurological disorder     migraines Past Surgical History:  Past Surgical History:   Procedure Laterality Date    HX GYN       x 2, btl       Family History:  No family history on file. Social History:  Social History     Tobacco Use    Smoking status: Current Every Day Smoker     Packs/day: 0.50    Smokeless tobacco: Never Used   Substance Use Topics    Alcohol use: Yes     Comment: rarely    Drug use: No       Allergies: Allergies   Allergen Reactions    Latex Rash    Ativan [Lorazepam] Other (comments)     Aggitation    Phenergan [Promethazine] Rash    Zoloft [Sertraline] Rash         Review of Systems   Review of Systems   Constitutional: Negative for chills and fever. HENT: Negative for congestion, ear pain, sinus pain and sore throat. Eyes: Negative for pain and visual disturbance. Respiratory: Positive for cough, shortness of breath and wheezing. Cardiovascular: Negative for chest pain and leg swelling. Gastrointestinal: Negative for abdominal pain, constipation, diarrhea, nausea and vomiting. Genitourinary: Negative for dysuria, hematuria, vaginal bleeding and vaginal discharge. Musculoskeletal: Negative for back pain and neck pain. Skin: Negative for rash and wound. Neurological: Negative for dizziness, tremors, weakness, light-headedness and numbness. All other systems reviewed and are negative.       Physical Exam     Vitals:    10/20/19 2147 10/20/19 2201 10/20/19 2245   BP: (!) 136/104  107/58   Pulse: (!) 142     Resp: 20     Temp: 99 °F (37.2 °C)     SpO2: 94% 94% (!) 89%   Weight: 77.1 kg (170 lb)       Physical Exam    Nursing note and vitals reviewed    Constitutional: Young  female in moderate respiratory distress  Head: Normocephalic, Atraumatic  Eyes: Pupils are equal, round, and reactive to light, EOMI  Neck: Supple, non-tender  Cardiovascular: Tachycardic, no murmurs, rubs, or gallops  Chest: Tachypneic but with symmetrical chest excursion bilaterally  Lungs: Diminished breath sounds with moderate scattered expiratory wheezes  Abdomen: Soft, non tender, non distended, normoactive bowel sounds  Back: No evidence of trauma or deformity  Extremities: No evidence of trauma or deformity, no LE edema  Skin: Warm and dry, normal cap refill  Neuro: Alert and appropriate, CN intact, normal speech, moving all 4 extremities freely and symmetrically  Psychiatric: Normal mood and affect       Diagnostic Study Results     Labs -   No results found for this or any previous visit (from the past 12 hour(s)). Radiologic Studies -   XR CHEST PORT    (Results Pending)   RADIOLOGY FINDINGS  Chest x-ray shows no acute process  Pending review by Radiologist  Recorded by Jesse Leyva, DO      CT Results  (Last 48 hours)    None        CXR Results  (Last 48 hours)    None            Medical Decision Making   I am the first provider for this patient. I reviewed the vital signs, available nursing notes, past medical history, past surgical history, family history and social history. Vital Signs-Reviewed the patient's vital signs. Pulse Oximetry Analysis -94 % on room air    Records Reviewed: Nursing Notes and Old Medical Records    Provider Notes:   26 y.o. female with a history of asthma, nicotine dependence presenting with shortness of breath and wheezing. On exam in moderate respiratory distress tachypneic, tachycardic, saturating 94% on room air. Diminished breath sounds with moderate scattered expiratory wheezes. Will give duo nebs, Solu-Medrol, magnesium obtain chest x-ray. Procedures:  Procedures    ED Course:   9:43 PM   Initial assessment performed. The patients presenting problems have been discussed, and they are in agreement with the care plan formulated and outlined with them. I have encouraged them to ask questions as they arise throughout their visit. SMOKING CESSATION:  The patient was counseled on the dangers of tobacco use, and was advised to quit. Reviewed strategies to maximize success, including removing cigarettes and smoking materials from environment. Discussion took 3-5 minutes, and pt expressed understanding. 11:11 PM  On reassessment, patient hypoxic in the low 90s. Patient placed on 2 L of nasal cannula. Still persistently wheezing. Will admit. Chest x-ray showing no pneumonia however due to her history of nicotine dependence and change in her sputum production, will cover with antibiotics. Diagnosis and Disposition     11:10 PM  I have spent 75 minutes of critical care time involved in lab review, consultations with specialist, family decision-making, and documentation. During this entire length of time I was immediately available to the patient. Critical Care: The reason for providing this level of medical care for this critically ill patient was due a critical illness that impaired one or more vital organ systems such that there was a high probability of imminent or life threatening deterioration in the patients condition. This care involved high complexity decision making to assess, manipulate, and support vital system functions, to treat this degreee vital organ system failure and to prevent further life threatening deterioration of the patients condition. Core Measures:  For Hospitalized Patients:    1. Hospitalization Decision Time:  The decision to hospitalize the patient was made by Janelle Leyva DO at 11:10 PM on 10/20/2019    2. Aspirin: Aspirin was not given because the patient did not present with a stroke at the time of their Emergency Department evaluation    11:10 PM  Patient is being admitted to the hospital by Dr. Celeste Guerra. The results of their tests and reasons for their admission have been discussed with them and/or available family. They convey agreement and understanding for the need to be admitted and for their admission diagnosis.       CONDITIONS ON ADMISSION:  Sepsis is not present at the time of admission. Pneumonia is not present at the time of admission. MRSA is not present at the time of admission. Wound infection is not present at the time of admission. Pressure Ulcer is not present at the time of admission. CLINICAL IMPRESSION:    1. Cigarette nicotine dependence without complication    2. Acute bronchitis, unspecified organism    3. Moderate persistent asthma with acute exacerbation    4. Hypoxia      ____________________________________     Please note that this dictation was completed with [employer], the computer voice recognition software. Quite often unanticipated grammatical, syntax, homophones, and other interpretive errors are inadvertently transcribed by the computer software. Please disregard these errors. Please excuse any errors that have escaped final proofreading.

## 2019-10-21 NOTE — ROUTINE PROCESS
Received report alongside Dian Cisneros RN from [address]. Precepting for Dian Cisneros today.

## 2019-10-21 NOTE — PROGRESS NOTES
Reason for Admission:   SOB                   RRAT Score:   Low; 7                  Plan for utilizing home health:     unlikely                     Current Advanced Directive/Advance Care Plan:  Not on file                         Transition of Care Plan:   Physician follow up                    Chart reviewed. Per H&P \"Patricia Hubbard is a 26 y.o. female who the past medical history of moderately persistent asthma, nicotine dependence, tobacco abuse presents to the emergency room complaining of shortness of breath and wheezing that began today. She also reports approximately 2-day history of a cough productive of greenish sputum. She reports that the trigger for this asthma attack is a chest cold that began approximately a week ago. No known sick contact is her 6year-old daughter. She states that she normally takes Symbicort, Combivent and DuoNeb's at home but has run out of her Symbicort and Combivent and this is why the asthma attack has persisted. Shes denies chest pain, nausea, vomiting, diarrhea, fever, chills, night sweats, abdominal pain or flulike symptoms. Intubation history: Never been intubated. Number of ER visits for asthma in the last 6 months: 4-5 ED visits. Number of overnight admissions for asthma exacerbations this year: zero. Reports that at her PCPs office there is a pulmonologist there that she can see if it is needed, but she has not seen him recently. \"    Please encourage ambulation to assist with transition of care. No needs have been identified at this time. Care Management Interventions  PCP Verified by CM: Yes  Mode of Transport at Discharge:  Other (see comment)(Family)  Transition of Care Consult (CM Consult): Discharge Planning  Health Maintenance Reviewed: Yes  Current Support Network: Family Lives Nearby  Confirm Follow Up Transport: Self

## 2019-10-21 NOTE — PROGRESS NOTES
Pt remains MEWS 3 for elevated HR. Pt refused IV fluid bolus and IV abx approx 20 minutes after initiation. States IV site is irritated. Will not allow RN to flush, further assess, or attempt new access. States intends to receive scheduled neb, perform oxygen walk test, then leave at 1200hrs \"whether y'all are ready for me to go or not. \"  Dr. Hilda Wilkins made aware.

## 2019-10-21 NOTE — PROGRESS NOTES
Problem: General Medical Care Plan  Goal: *Vital signs within specified parameters  Outcome: Resolved/Met  Goal: *Labs within defined limits  Outcome: Resolved/Met  Goal: *Absence of infection signs and symptoms  Outcome: Resolved/Met  Goal: *Optimal pain control at patient's stated goal  Outcome: Resolved/Met  Goal: *Skin integrity maintained  Outcome: Resolved/Met  Goal: *Fluid volume balance  Outcome: Resolved/Met  Goal: *Optimize nutritional status  Outcome: Resolved/Met  Goal: *Anxiety reduced or absent  Outcome: Resolved/Met  Goal: *Progressive mobility and function (eg: ADL's)  Outcome: Resolved/Met

## 2019-10-21 NOTE — PROGRESS NOTES
Shift Summary- Patient has decided to leave AMA. Dr. Alexus Silva spoke with patient about it not being in her best interest to leave as her O2 sats have been in the 84% in the last hour. Patient states that she will be better at home and understands all the risks at this time. Dr. Alexus Silva was aware patient decided to leave and says patient may take prescriptions with her.

## 2019-10-21 NOTE — ROUTINE PROCESS
Pt refused oxygen walk test in traditional form. Stated \"I got up and walked in the room\" not in presence of RN. Per SpO2 monitor, lowest SpO2 in past hour was 84% with strong pleth. Dr. Mariposa Anguiano made aware by Rico Bloom RN and counseled patient. Pt states intent to leave AMA, stating she is best  of her condition. Per Dr. Mariposa Anguiano, pt may leave with prescriptions.

## 2019-10-21 NOTE — ED NOTES
Pt respirations rapid and unlabored. Skin w/d/p. Pt remains tachypneic and tachycardic. Asked pt again if she wanted to use the oxygen and she refused.  Pt transported to her inpatient room by hunter

## 2019-10-21 NOTE — PROGRESS NOTES
The care plan was initiated and reviewed with pt. Care plan options were discussed and questions answered. The pt  Problem: General Medical Care Plan  Goal: *Vital signs within specified parameters  Outcome: Progressing Towards Goal  Goal: *Labs within defined limits  Outcome: Progressing Towards Goal  Goal: *Absence of infection signs and symptoms  Outcome: Progressing Towards Goal  Goal: *Optimal pain control at patient's stated goal  Outcome: Progressing Towards Goal  Goal: *Skin integrity maintained  Outcome: Progressing Towards Goal  Goal: *Fluid volume balance  Outcome: Progressing Towards Goal  Goal: *Optimize nutritional status  Outcome: Progressing Towards Goal  Goal: *Anxiety reduced or absent  Outcome: Progressing Towards Goal  Goal: *Progressive mobility and function (eg: ADL's)  Outcome: Progressing Towards Goal     Problem: General Medical Care Plan  Goal: *Skin integrity maintained  Outcome: Progressing Towards Goal     Problem: General Medical Care Plan  Goal: *Labs within defined limits  Outcome: Progressing Towards Goal    understand instructions and will follow up as directed.

## 2019-10-21 NOTE — PROGRESS NOTES
0120  Admitted to room from ER. Up in room adjusting temperature. States she feels hot and cold because of the steroids. Call bell in reach. 0140  's, rasing MEWS score. Pt does not appear to be in any distress. HR elevated due to steroids and resp treatments. No chest pain, other pain. No resp distress, talking in complete sentences. States she has never been positive for c-diff, has never had any intestinal problems and C-diff positivity is from a faulty entry into her chart. [id number]  MD at bedside. 0438  Resting quietly in NAD. Refuses breathing treatment by respiratory, states she does not need one now. Wheezes remain faintly. 0630  Quietly in bed, just returned from restroom, NAD.    0720  [facility]Bedside shift change report given to LIZBETH Harding (oncoming nurse) by TENISHA Payne RN(offgoing nurse). Report included the following information Kardex, Intake/Output, MAR and Recent Results.

## 2019-10-21 NOTE — ED NOTES
Unit called and notified that pt's chart is ready for review and to call if there are any questions.

## 2019-10-21 NOTE — H&P
History & Physical    Patient: Maria Barerra MRN: [id number]  CSN: [id number]    YOB: 1990  Age: 26 y.o. Sex: female      DOA: 10/20/2019  Primary Care Provider:  Dony Ortega MD      Assessment/Plan   79-year-old female with a history of nicotine dependence and tobacco abuse, moderate persistent asthma was admitted for acute asthma exacerbation. She received 3 DuoNeb's, Solu-Medrol and mag in the ED. Admit to MedSurg  Continuous pulse oximetry  Oxygen supplementation via nasal cannula to keep O2 sats above 94%  Duo nebs every 4 hours as needed shortness of breath, cough or wheezing  Pulmicort neb every 12 hours  Solu-Medrol IV 60 mg every 6 hours  Continue azithromycin and Rocephin IV  Offer nicotine patch  DVT/GI prophylaxis: Heparin and Pepcid      Patient Active Problem List   Diagnosis Code    Bronchitis J40    Asthma exacerbation J45.901       Estimated length of stay : At least 2 midnights    CC: SOB        HPI:     Maria Barrera is a 26 y.o. female who the past medical history of moderately persistent asthma, nicotine dependence, tobacco abuse presents to the emergency room complaining of shortness of breath and wheezing that began today. She also reports approximately 2-day history of a cough productive of greenish sputum. She reports that the trigger for this asthma attack is a chest cold that began approximately a week ago. No known sick contact is her 6year-old daughter. She states that she normally takes Symbicort, Combivent and DuoNeb's at home but has run out of her Symbicort and Combivent and this is why the asthma attack has persisted. Shes denies chest pain, nausea, vomiting, diarrhea, fever, chills, night sweats, abdominal pain or flulike symptoms. Intubation history: Never been intubated. Number of ER visits for asthma in the last 6 months: 4-5 ED visits. Number of overnight admissions for asthma exacerbations this year: zero.    Reports that at her PCPs office there is a pulmonologist there that she can see if it is needed, but she has not seen him recently. Past Medical History:   Diagnosis Date    Asthma     Ill-defined condition     ovarian cyst    Neurological disorder     migraines       Past Surgical History:   Procedure Laterality Date    HX GYN       x 2, btl       Family history: Unremarkable    Social History     Socioeconomic History    Marital status:      Spouse name: Not on file    Number of children: Not on file    Years of education: Not on file    Highest education level: Not on file   Tobacco Use    Smoking status: Current Every Day Smoker     Packs/day: 0.50    Smokeless tobacco: Never Used   Substance and Sexual Activity    Alcohol use: Yes     Comment: rarely    Drug use: No       Prior to Admission medications    Medication Sig Start Date End Date Taking? Authorizing Provider   ondansetron (ZOFRAN ODT) 4 mg disintegrating tablet Take 1 Tab by mouth every eight (8) hours as needed for Nausea. 9/15/19   Brent Pimentel PA   ipratropium-albuterol (COMBIVENT RESPIMAT)  mcg/actuation inhaler Take 1 Puff by inhalation every six (6) hours. Other, MD Carl   budesonide-formoterol (SYMBICORT) 160-4.5 mcg/actuation HFAA Take 2 Puffs by inhalation two (2) times a day. Other, MD Carl   albuterol (PROVENTIL HFA, VENTOLIN HFA, PROAIR HFA) 90 mcg/actuation inhaler Take 1-2 Puffs by inhalation every four (4) hours as needed for Wheezing. 18   Patricia Magana MD       Allergies   Allergen Reactions    Latex Rash    Ativan [Lorazepam] Other (comments)     Aggitation    Phenergan [Promethazine] Rash    Zoloft [Sertraline] Rash       Review of Systems  Gen: No fever, chills, malaise, weight loss/gain. Heent: No headache, rhinorrhea, epistaxis, ear pain, hearing loss, sinus pain, neck pain/stiffness, sore throat. Heart: No chest pain, palpitations, GLASRE, pnd, or orthopnea.    Resp: Reports cough, wheezing and shortness of breath   GI: No nausea, vomiting, diarrhea, constipation, melena or hematochezia. : No urinary obstruction, dysuria or hematuria. Derm: No rash, new skin lesion or pruritis. Musc/skeletal: no bone or joint complains. Vasc: No edema, cyanosis or claudication. Endo: No heat/cold intolerance, no polyuria,polydipsia or polyphagia. Neuro: No unilateral weakness, numbness, tingling. No seizures. Heme: No easy bruising or bleeding. Physical Exam:     Physical Exam:  Visit Vitals  /66 (BP 1 Location: Left arm, BP Patient Position: At rest)   Pulse (!) 132   Temp 98.4 °F (36.9 °C)   Resp (!) 32   Wt 77.1 kg (170 lb)   SpO2 94%   BMI 28.29 kg/m²    O2 Flow Rate (L/min): 8 l/min O2 Device: Room air    Temp (24hrs), Av.7 °F (37.1 °C), Min:98.4 °F (36.9 °C), Max:99 °F (37.2 °C)    No intake/output data recorded. No intake/output data recorded. General:  Awake, cooperative, mild respiratory distress, but able to talk in complete sentences, using accessory muscles of breathing   Head:  Normocephalic, without obvious abnormality, atraumatic. Eyes:  Conjunctivae/corneas clear, sclera anicteric, PERRL, EOMs intact. Nose: Nares normal. No drainage or sinus tenderness. Throat: Lips, mucosa, and tongue normal.    Neck: Supple, symmetrical, trachea midline, no adenopathy. Lungs:    Coarse breath sounds, wheezes heard all throughout all lung fields       Heart:  Regular rate and rhythm, S1, S2 normal, no murmur, click, rub or gallop. Abdomen: Soft, non-tender. Bowel sounds normal. No masses,  No organomegaly. Extremities: Extremities normal, atraumatic, no cyanosis or edema. Capillary refill normal.   Pulses: 2+ and symmetric all extremities. Skin: Skin color wnl, turgor normal. No rashes or lesions   Neurologic: CNII-XII intact. No focal motor or sensory deficit.        Labs Reviewed:  No results found for this or any previous visit (from the past 24 hour(s)). Procedures/imaging: see electronic medical records for all procedures/Xrays and details which were not copied into this note but were reviewed prior to creation of Plan    CC:  Anna Silva MD

## 2019-10-21 NOTE — ROUTINE PROCESS
Pt placed on continuous pulse-ox. SpO2 reading 90%. Pt declines supplemental O2. States is breathing comfortably and baseline SpO2 is 89-91%. Utilizing IS and received neb tx this morning. States feels considerably \"better\" and breathing less labored than admission. Pt is aware of MD order to maintain SpO2 @ 95% via supplementation.

## 2019-10-21 NOTE — PROGRESS NOTES
Bedside shift change report given to Amalia Riuz RN (oncoming nurse) by Juliet Blum RN (offgoing nurse). Report included the following information SBAR, Kardex, ED Summary, Procedure Summary, Intake/Output, MAR and Recent Results.

## 2019-10-21 NOTE — PROGRESS NOTES
Hospitalist Progress Note    Patient: Priscilla Castillo MRN: [id number]  CSN: [id number]    YOB: 1990  Age: 26 y.o. Sex: female    DOA: 10/20/2019 LOS:  LOS: 1 day          Chief Complaint:    Asthma exacerbation      Assessment/Plan     Moderate persistent asthma with exacerbation  Ac bronchitis versus early pneumonia  UTI  Tobacco use    She has advised me this am she can do all this at home and wants to be d/c-ed, and she is leaving at noon    I asked her to please do walk test as she is still wheezing but appears comfortabe in no distress    She has taken the steroids and antibiotics    Disposition :  Patient Active Problem List   Diagnosis Code    Bronchitis J40    Asthma exacerbation J45.901       Subjective:    I need to get out of here! I feel fine and I can do all this at home! Denies worsened wheezing  Better since admission  Prod cough-green sputum          Review of systems:    Constitutional: denies fevers  Respiratory: cough  Cardiovascular: denies chest pain, palpitations  Gastrointestinal: denies nausea, vomiting, diarrhea      Vital signs/Intake and Output:  Visit Vitals  /77 (BP 1 Location: Right arm, BP Patient Position: At rest)   Pulse (!) 134   Temp 98.6 °F (37 °C)   Resp 26   Ht 5' 7\" (1.702 m)   Wt 77.1 kg (170 lb)   SpO2 95%   BMI 26.63 kg/m²     Current Shift:  10/20 1901 - 10/21 0700  In: 240 [P.O.:240]  Out: -   Last three shifts:  No intake/output data recorded.     Exam:    General: Well developed, alert, NAD, OX3  CVS:Regular rate and rhythm, no M/R/G, S1/S2 heard, no thrill  Lungs:coarse exp wheezes, scattered  Abdomen: Soft, Nontender, No distention, Normal Bowel sounds, No hepatomegaly  Extremities: No C/C/E, pulses palpable 2+  Neuro:grossly normal , follows commands  Psych:appropriate                Labs: Results:       Chemistry No results for input(s): GLU, NA, K, CL, CO2, BUN, CREA, CA, AGAP, BUCR, TBIL, GPT, AP, TP, ALB, GLOB, AGRAT in the last 72 hours.   CBC w/Diff No results for input(s): WBC, RBC, HGB, HCT, PLT, GRANS, LYMPH, EOS, HGBEXT, HCTEXT, PLTEXT in the last 72 hours. Cardiac Enzymes No results for input(s): CPK, CKND1, SULAIMAN in the last 72 hours. No lab exists for component: CKRMB, TROIP   Coagulation No results for input(s): PTP, INR, APTT, INREXT in the last 72 hours. Lipid Panel No results found for: CHOL, CHOLPOCT, CHOLX, CHLST, CHOLV, 368135, HDL, HDLP, LDL, LDLC, DLDLP, 964452, VLDLC, VLDL, TGLX, TRIGL, TRIGP, TGLPOCT, CHHD, CHHDX   BNP No results for input(s): BNPP in the last 72 hours. Liver Enzymes No results for input(s): TP, ALB, TBIL, AP, SGOT, GPT in the last 72 hours.     No lab exists for component: DBIL   Thyroid Studies No results found for: T4, T3U, TSH, TSHEXT     Procedures/imaging: see electronic medical records for all procedures/Xrays and details which were not copied into this note but were reviewed prior to creation of Shakeel Flynn MD

## 2019-10-22 NOTE — DISCHARGE SUMMARY
[address]    Name:  Dasia Chavez  MR#:   [id number]  :  1990  ACCOUNT #:  [de-identified]  ADMIT DATE:  10/20/2019  DISCHARGE DATE:  10/21/2019    BRIEF HISTORY AND HOSPITAL COURSE: :  The patient is adamant that she leave the hospital today. She was admitted for acute asthma exacerbation with acute bronchitis, possible early pneumonia. She is a 76-year-old female with a history of moderately persistent asthma, nicotine dependence, tobacco abuse who came in to the emergency room complaining of shortness of breath and wheezing that began that day. She had a 2-day history of cough productive of greenish sputum. She states that she gets pneumonia every year at this time. She is normally on Symbicort, Combivent and DuoNeb treatments at home with a nebulizer, but she recently ran out of her Symbicort and Combivent and thinks that is why her asthma symptoms overwhelmed her. She has had a number of ER visits for asthma in the last 6 months, previous overnight admissions, but none this year. The patient was admitted for steroids, IV antibiotics and breathing treatments and tolerated those well overnight. However, she is insistent that she is well enough to leave the hospital today and that she can do all of these treatments at home. PHYSICAL EXAMINATION:  GENERAL:  She is awake and alert. She is in no acute respiratory distress. She is sitting comfortably on the bed, not on oxygen. VITAL SIGNS:  Her pulse is 117, temperature 97.8, blood pressure 124/68, SaO2 is 91%-95% on room air. LUNGS:  She has bilateral scattered wheezes expiratory wise and few rhonchi. Good air exchange otherwise. CARDIAC:  Tachycardic, regular rhythm. No murmur. ABDOMEN:  Soft, nontender. EXTREMITIES:  Lower extremities are without edema. She is going to leave against medical advice at noon today because her boyfriend is coming over to pick her up anyways.   So, I am trying to ensure that we at least have the right medications for her and that if she can properly do a walk test in the hallway and not become hypoxic, I will let her discharge. Otherwise, I would recommend she stay and she would have to sign out against medical advice. In the interim, because she has run out of her medications, I am going to printout her Symbicort prescription for her as well as give her a taper of prednisone starting at 60 mg and going down to 10 mg over the next 9 days. I am going to prescribe an albuterol inhaler to use as a rescue inhaler every 4 hours as needed also for her and her DuoNeb solution for her nebulizer as I am not 100% sure if she still has that one at home or not. So, after she goes through a walk test and if she does reasonably well with that, we can let her discharge with close followup with her primary care physician which is scheduled for this week. She apparently sees a pulmonologist at their office also. If she becomes hypoxic with walking, I am not going to recommend she discharge and she would have to sign out against medical advice. I also recommend that she get a fluid bolus this morning which she has agreed to do. I have retimed her antibiotics, but she told the nurse she was not going to finish the antibiotic and pulled her IV out. I recommended she get an IV steroid dose and I am not sure she is going to complete that either. At this point, I recommended she continue antibiotics for possible early pneumonia also on the chest x-ray and at least bronchitis. She agrees to do that. Hopefully, she can improve with outpatient treatment at this juncture. She is going to leave the hospital at this point regardless of what we do officially to discharge her or not.   So, I will try to make sure that she will at least have her medications printed ready to take to her pharmacy for a filling for an antibiotic, a steroid, a rescue inhaler, her Symbicort, Combivent, and DuoNebs that she is supposed to be on at home. FOLLOWUP:  To follow up with her PCP as scheduled for the 24th, that is, Dr. Lisa Lennon. Thirty-five minutes discharge time.       Pawan Cota MD      [id number]  D:  10/21/2019 11:44  T:  10/22/2019 2:22  JOB #:  [id number]  CC:

## 2019-12-29 ENCOUNTER — HOSPITAL ENCOUNTER (EMERGENCY)
Age: 29
Discharge: HOME | End: 2019-12-29
Attending: EMERGENCY MEDICINE
Payer: COMMERCIAL

## 2019-12-29 VITALS
WEIGHT: 165 LBS | RESPIRATION RATE: 20 BRPM | HEIGHT: 65 IN | TEMPERATURE: 97.8 F | BODY MASS INDEX: 27.49 KG/M2 | SYSTOLIC BLOOD PRESSURE: 131 MMHG | OXYGEN SATURATION: 92 % | DIASTOLIC BLOOD PRESSURE: 80 MMHG | HEART RATE: 104 BPM

## 2019-12-29 DIAGNOSIS — J45.901 ASTHMA WITH ACUTE EXACERBATION, UNSPECIFIED ASTHMA SEVERITY, UNSPECIFIED WHETHER PERSISTENT: Primary | ICD-10-CM

## 2019-12-29 PROCEDURE — 94640 AIRWAY INHALATION TREATMENT: CPT

## 2019-12-29 PROCEDURE — 99283 EMERGENCY DEPT VISIT LOW MDM: CPT

## 2019-12-29 PROCEDURE — 74011636637 HC RX REV CODE- 636/637: Performed by: EMERGENCY MEDICINE

## 2019-12-29 PROCEDURE — 77030013140 HC MSK NEB VYRM -A

## 2019-12-29 PROCEDURE — 74011000250 HC RX REV CODE- 250: Performed by: EMERGENCY MEDICINE

## 2019-12-29 RX ORDER — PREDNISONE 50 MG/1
50 TABLET ORAL DAILY
Qty: 4 TAB | Refills: 0 | Status: SHIPPED | OUTPATIENT
Start: 2019-12-29 | End: 2020-01-02

## 2019-12-29 RX ORDER — PREDNISONE 20 MG/1
60 TABLET ORAL
Status: COMPLETED | OUTPATIENT
Start: 2019-12-29 | End: 2019-12-29

## 2019-12-29 RX ORDER — IPRATROPIUM BROMIDE AND ALBUTEROL SULFATE 2.5; .5 MG/3ML; MG/3ML
3 SOLUTION RESPIRATORY (INHALATION)
Status: COMPLETED | OUTPATIENT
Start: 2019-12-29 | End: 2019-12-29

## 2019-12-29 RX ORDER — BUDESONIDE AND FORMOTEROL FUMARATE DIHYDRATE 160; 4.5 UG/1; UG/1
2 AEROSOL RESPIRATORY (INHALATION) 2 TIMES DAILY
Qty: 1 INHALER | Refills: 0 | Status: SHIPPED | OUTPATIENT
Start: 2019-12-29 | End: 2020-02-04

## 2019-12-29 RX ADMIN — IPRATROPIUM BROMIDE AND ALBUTEROL SULFATE 3 ML: .5; 3 SOLUTION RESPIRATORY (INHALATION) at 16:12

## 2019-12-29 RX ADMIN — PREDNISONE 60 MG: 20 TABLET ORAL at 16:03

## 2019-12-29 RX ADMIN — IPRATROPIUM BROMIDE AND ALBUTEROL SULFATE 3 ML: .5; 3 SOLUTION RESPIRATORY (INHALATION) at 15:27

## 2019-12-29 NOTE — ED PROVIDER NOTES
EMERGENCY DEPARTMENT HISTORY AND PHYSICAL EXAM    Date: 12/29/2019  Patient Name: La Lorenzo    History of Presenting Illness     Chief Complaint   Patient presents with    Wheezing         History Provided By: Patient and Patient's      History (Narative):   3:20 PM  La Lorenzo is a 26 y.o. female with PMHX of asthma who presents to the emergency department C/O asthma exacerbation onset yesterday. Patient is out of her Combivent and Symbicort at home. She has had a worsening asthma attack since yesterday. Associated sxs include wheezing and shortness of breath with associated chills. Pt denies fever, nausea, vomiting or any other sxs or complaints. Chief Complaint: wheezing  Duration: 1 day   Timing:  acute  Location: chest  Quality: Tightness  Severity: Moderate  Modifying Factors: Nothing makes it better or worse. Associated Symptoms: SOB, chills    PCP: Kylie Mosqueda MD    Current Outpatient Medications   Medication Sig Dispense Refill    budesonide-formoterol (SYMBICORT) 160-4.5 mcg/actuation HFAA Take 2 Puffs by inhalation two (2) times a day. 1 Inhaler 0    ipratropium-albuterol (COMBIVENT RESPIMAT)  mcg/actuation inhaler Take 1 Puff by inhalation every six (6) hours. 1 Inhaler 0    predniSONE (DELTASONE) 50 mg tablet Take 1 Tab by mouth daily for 4 days. First dose 30 Dec 19 4 Tab 0    predniSONE (DELTASONE) 20 mg tablet 3 tab PO daily X 3 days, then 2 tab PO daily X 2 days, then 1 tab PO daily X 2 days, and 1/2 tab 10 mg daily for 2 days 18 Tab 0    amoxicillin-clavulanate (AUGMENTIN) 875-125 mg per tablet Take 1 Tab by mouth two (2) times a day. 14 Tab 0    albuterol (PROVENTIL HFA, VENTOLIN HFA, PROAIR HFA) 90 mcg/actuation inhaler Take 2 Puffs by inhalation every four (4) hours as needed for Wheezing. 1 Inhaler 0    albuterol-ipratropium (DUO-NEB) 2.5 mg-0.5 mg/3 ml nebu 3 mL by Nebulization route every four (4) hours as needed (sob).  30 Nebule 1    ondansetron (ZOFRAN ODT) 4 mg disintegrating tablet Take 1 Tab by mouth every eight (8) hours as needed for Nausea. 20 Tab 0       Past History     Past Medical History:  Past Medical History:   Diagnosis Date    Asthma     Ill-defined condition     ovarian cyst    Neurological disorder     migraines       Past Surgical History:  Past Surgical History:   Procedure Laterality Date    HX GYN       x 2, btl       Family History:  History reviewed. No pertinent family history. Social History:  Social History     Tobacco Use    Smoking status: Current Every Day Smoker     Packs/day: 0.25    Smokeless tobacco: Never Used   Substance Use Topics    Alcohol use: Yes     Comment: rarely    Drug use: No       Allergies: Allergies   Allergen Reactions    Latex Rash    Ativan [Lorazepam] Other (comments)     Aggitation    Phenergan [Promethazine] Rash    Zoloft [Sertraline] Rash         Review of Systems   Review of Systems   Constitutional: Positive for chills. Negative for fever. HENT: Positive for rhinorrhea and sinus pressure. Respiratory: Positive for cough, chest tightness and shortness of breath. Cardiovascular: Negative for chest pain. Gastrointestinal: Negative for abdominal pain, nausea and vomiting. All other systems reviewed and are negative. Physical Exam     Vitals:    19 1503 19 1529 19 1605 19 1613   BP: 131/80      Pulse: (!) 104      Resp: 20      Temp: 97.8 °F (36.6 °C)      SpO2: 95% 95% 93% 92%   Weight: 74.8 kg (165 lb)      Height: 5' 5\" (1.651 m)        Physical Exam  Vitals signs and nursing note reviewed. Vital signs and nursing notes reviewed  CONSTITUTIONAL: Alert, in no apparent distress; well-developed; well-nourished. HEAD:  Normocephalic, atraumatic  EYES: PERRL; EOM's intact. ENTM: Nose: no rhinorrhea;  Throat: no erythema or exudate, mucous membranes moist  Neck:  No JVD, supple without lymphadenopathy  RESP: Speaks in 3 word sentences. Bilateral end expiratory wheezing. Fair air movement. CV: S1 and S2 WNL; No murmurs, gallops or rubs. GI: Normal bowel sounds, abdomen soft and non-tender. No masses or organomegaly. : No costo-vertebral angle tenderness. BACK:  Non-tender  UPPER EXT:  Normal inspection  LOWER EXT: No edema, no calf tenderness. Distal pulses intact. NEURO: CN intact, reflexes 2/4 and sym, strength 5/5 and sym, sensation intact. SKIN: No rashes; Normal for age and stage. PSYCH:  Alert and oriented, normal affect. Diagnostic Study Results     Labs -   No results found for this or any previous visit (from the past 12 hour(s)). Radiologic Studies -   No orders to display     CT Results  (Last 48 hours)    None        CXR Results  (Last 48 hours)    None          Medications given in the ED-  Medications   predniSONE (DELTASONE) tablet 60 mg (60 mg Oral Given 12/29/19 1603)   albuterol-ipratropium (DUO-NEB) 2.5 MG-0.5 MG/3 ML (3 mL Nebulization Given 12/29/19 1527)   albuterol-ipratropium (DUO-NEB) 2.5 MG-0.5 MG/3 ML (3 mL Nebulization Given 12/29/19 1612)         Medical Decision Making   I am the first provider for this patient. I reviewed the vital signs, available nursing notes, past medical history, past surgical history, family history and social history. Vital Signs-Reviewed the patient's vital signs. Pulse Oximetry Analysis - 95% on RA     Cardiac Monitor:  Rate: 104 bpm  Rhythm: sinus tachycardia    Records Reviewed: Nursing Notes    Provider Notes (Medical Decision Making):   DDX: Asthma exacerbation, URI, pneumonia, medication refill    Procedures:  Procedures    ED Course:   3:20 PM Initial assessment performed. The patients presenting problems have been discussed, and they are in agreement with the care plan formulated and outlined with them. I have encouraged them to ask questions as they arise throughout their visit.    4:06 PM reevaluated patient.   Wheezing is improved but not completely resolved. She just got her steroids. 4:52 PM patient with good air movement much improved over prior. Patient has some mild residual wheezing which she states is almost always present. She feels much better than when she came in. Diagnosis and Disposition     Discussion:  26 y.o. female with acute asthma exacerbation. Patient improved with steroids and DuoNeb in the ED. We will refill her chronic asthma meds at home. Patient will follow-up with her primary care doctor. She understands and agrees this plan. DISCHARGE NOTE:  4:53 PM   Patricia Carrasquillo's  results have been reviewed with her. She has been counseled regarding her diagnosis, treatment, and plan. She verbally conveys understanding and agreement of the signs, symptoms, diagnosis, treatment and prognosis and additionally agrees to follow up as discussed. She also agrees with the care-plan and conveys that all of her questions have been answered. I have also provided discharge instructions for her that include: educational information regarding their diagnosis and treatment, and list of reasons why they would want to return to the ED prior to their follow-up appointment, should her condition change. She has been provided with education for proper emergency department utilization. CLINICAL IMPRESSION:    1. Asthma with acute exacerbation, unspecified asthma severity, unspecified whether persistent        PLAN:  1. D/C Home  2. Current Discharge Medication List      START taking these medications    Details   ! ! predniSONE (DELTASONE) 50 mg tablet Take 1 Tab by mouth daily for 4 days. First dose 30 Dec 19  Qty: 4 Tab, Refills: 0       !! - Potential duplicate medications found. Please discuss with provider. CONTINUE these medications which have CHANGED    Details   budesonide-formoterol (SYMBICORT) 160-4.5 mcg/actuation HFAA Take 2 Puffs by inhalation two (2) times a day.   Qty: 1 Inhaler, Refills: 0 ipratropium-albuterol (COMBIVENT RESPIMAT)  mcg/actuation inhaler Take 1 Puff by inhalation every six (6) hours. Qty: 1 Inhaler, Refills: 0         CONTINUE these medications which have NOT CHANGED    Details   ! ! predniSONE (DELTASONE) 20 mg tablet 3 tab PO daily X 3 days, then 2 tab PO daily X 2 days, then 1 tab PO daily X 2 days, and 1/2 tab 10 mg daily for 2 days  Qty: 18 Tab, Refills: 0      amoxicillin-clavulanate (AUGMENTIN) 875-125 mg per tablet Take 1 Tab by mouth two (2) times a day. Qty: 14 Tab, Refills: 0      albuterol (PROVENTIL HFA, VENTOLIN HFA, PROAIR HFA) 90 mcg/actuation inhaler Take 2 Puffs by inhalation every four (4) hours as needed for Wheezing. Qty: 1 Inhaler, Refills: 0      albuterol-ipratropium (DUO-NEB) 2.5 mg-0.5 mg/3 ml nebu 3 mL by Nebulization route every four (4) hours as needed (sob). Qty: 30 Nebule, Refills: 1      ondansetron (ZOFRAN ODT) 4 mg disintegrating tablet Take 1 Tab by mouth every eight (8) hours as needed for Nausea. Qty: 20 Tab, Refills: 0       !! - Potential duplicate medications found. Please discuss with provider. 3.   Follow-up Information     Follow up With Specialties Details Why Contact Info    Kelvin Christensen MD Internal Medicine In 2 days For follow up from Emergency Department visit. [facility] [address] [place] [address] EMERGENCY DEPT Emergency Medicine  As needed; If symptoms worsen [address] [facility] [id number]  [address]     Please note that this dictation was completed with [employer], the computer voice recognition software. Quite often unanticipated grammatical, syntax, homophones, and other interpretive errors are inadvertently transcribed by the computer software. Please disregard these errors. Please excuse any errors that have escaped final proofreading.     Daniel Hussein MD

## 2019-12-29 NOTE — ED TRIAGE NOTES
Pt w/ c/o of asthma exacerbation starting yesterday with productive cough and intermittent wheezing. Pt states she used 3 neb tx PTA with no relief, and out of Symbicort and rescue inhaler.

## 2019-12-29 NOTE — DISCHARGE INSTRUCTIONS

## 2020-02-04 ENCOUNTER — HOSPITAL ENCOUNTER (EMERGENCY)
Age: 30
Discharge: HOME | End: 2020-02-04
Attending: EMERGENCY MEDICINE
Payer: COMMERCIAL

## 2020-02-04 VITALS
BODY MASS INDEX: 31.65 KG/M2 | HEIGHT: 65 IN | OXYGEN SATURATION: 94 % | WEIGHT: 190 LBS | TEMPERATURE: 98.2 F | SYSTOLIC BLOOD PRESSURE: 145 MMHG | RESPIRATION RATE: 16 BRPM | HEART RATE: 115 BPM | DIASTOLIC BLOOD PRESSURE: 86 MMHG

## 2020-02-04 DIAGNOSIS — J45.51 SEVERE PERSISTENT ASTHMA WITH ACUTE EXACERBATION: Primary | ICD-10-CM

## 2020-02-04 LAB
ANION GAP SERPL CALC-SCNC: 2 MMOL/L (ref 3–18)
BASOPHILS # BLD: 0.1 K/UL (ref 0–0.1)
BASOPHILS NFR BLD: 1 % (ref 0–2)
BUN SERPL-MCNC: 16 MG/DL (ref 7–18)
BUN/CREAT SERPL: 18 (ref 12–20)
CALCIUM SERPL-MCNC: 8.9 MG/DL (ref 8.5–10.1)
CHLORIDE SERPL-SCNC: 110 MMOL/L (ref 100–111)
CO2 SERPL-SCNC: 27 MMOL/L (ref 21–32)
CREAT SERPL-MCNC: 0.89 MG/DL (ref 0.6–1.3)
DIFFERENTIAL METHOD BLD: ABNORMAL
EOSINOPHIL # BLD: 0.5 K/UL (ref 0–0.4)
EOSINOPHIL NFR BLD: 5 % (ref 0–5)
ERYTHROCYTE [DISTWIDTH] IN BLOOD BY AUTOMATED COUNT: 13 % (ref 11.6–14.5)
GLUCOSE SERPL-MCNC: 86 MG/DL (ref 74–99)
HCT VFR BLD AUTO: 50.2 % (ref 35–45)
HGB BLD-MCNC: 17.6 G/DL (ref 12–16)
LYMPHOCYTES # BLD: 2.8 K/UL (ref 0.9–3.6)
LYMPHOCYTES NFR BLD: 29 % (ref 21–52)
MCH RBC QN AUTO: 30.9 PG (ref 24–34)
MCHC RBC AUTO-ENTMCNC: 35.1 G/DL (ref 31–37)
MCV RBC AUTO: 88.2 FL (ref 74–97)
MONOCYTES # BLD: 0.5 K/UL (ref 0.05–1.2)
MONOCYTES NFR BLD: 5 % (ref 3–10)
NEUTS SEG # BLD: 6 K/UL (ref 1.8–8)
NEUTS SEG NFR BLD: 60 % (ref 40–73)
PLATELET # BLD AUTO: 282 K/UL (ref 135–420)
PMV BLD AUTO: 9.7 FL (ref 9.2–11.8)
POTASSIUM SERPL-SCNC: 4.3 MMOL/L (ref 3.5–5.5)
RBC # BLD AUTO: 5.69 M/UL (ref 4.2–5.3)
SODIUM SERPL-SCNC: 139 MMOL/L (ref 136–145)
WBC # BLD AUTO: 9.8 K/UL (ref 4.6–13.2)

## 2020-02-04 PROCEDURE — 80048 BASIC METABOLIC PNL TOTAL CA: CPT

## 2020-02-04 PROCEDURE — 74011000250 HC RX REV CODE- 250: Performed by: EMERGENCY MEDICINE

## 2020-02-04 PROCEDURE — 74011000250 HC RX REV CODE- 250: Performed by: PHYSICIAN ASSISTANT

## 2020-02-04 PROCEDURE — 96365 THER/PROPH/DIAG IV INF INIT: CPT

## 2020-02-04 PROCEDURE — 85025 COMPLETE CBC W/AUTO DIFF WBC: CPT

## 2020-02-04 PROCEDURE — 99284 EMERGENCY DEPT VISIT MOD MDM: CPT

## 2020-02-04 PROCEDURE — 74011250636 HC RX REV CODE- 250/636: Performed by: PHYSICIAN ASSISTANT

## 2020-02-04 PROCEDURE — 94640 AIRWAY INHALATION TREATMENT: CPT

## 2020-02-04 PROCEDURE — 77030013140 HC MSK NEB VYRM -A

## 2020-02-04 PROCEDURE — 96375 TX/PRO/DX INJ NEW DRUG ADDON: CPT

## 2020-02-04 RX ORDER — BUDESONIDE AND FORMOTEROL FUMARATE DIHYDRATE 160; 4.5 UG/1; UG/1
2 AEROSOL RESPIRATORY (INHALATION) 2 TIMES DAILY
Qty: 1 INHALER | Refills: 0 | Status: SHIPPED | OUTPATIENT
Start: 2020-02-04 | End: 2021-11-23 | Stop reason: SDUPTHER

## 2020-02-04 RX ORDER — METHYLPREDNISOLONE 4 MG/1
TABLET ORAL
Qty: 1 DOSE PACK | Refills: 0 | Status: SHIPPED | OUTPATIENT
Start: 2020-02-04 | End: 2020-02-22

## 2020-02-04 RX ORDER — ALBUTEROL SULFATE 90 UG/1
2 AEROSOL, METERED RESPIRATORY (INHALATION)
Qty: 1 INHALER | Refills: 0 | Status: SHIPPED | OUTPATIENT
Start: 2020-02-04 | End: 2020-03-18

## 2020-02-04 RX ORDER — IPRATROPIUM BROMIDE AND ALBUTEROL SULFATE 2.5; .5 MG/3ML; MG/3ML
3 SOLUTION RESPIRATORY (INHALATION)
Status: COMPLETED | OUTPATIENT
Start: 2020-02-04 | End: 2020-02-04

## 2020-02-04 RX ORDER — MAGNESIUM SULFATE HEPTAHYDRATE 40 MG/ML
2 INJECTION, SOLUTION INTRAVENOUS ONCE
Status: COMPLETED | OUTPATIENT
Start: 2020-02-04 | End: 2020-02-04

## 2020-02-04 RX ORDER — ALBUTEROL SULFATE 0.83 MG/ML
5 SOLUTION RESPIRATORY (INHALATION)
Status: COMPLETED | OUTPATIENT
Start: 2020-02-04 | End: 2020-02-04

## 2020-02-04 RX ORDER — IPRATROPIUM BROMIDE AND ALBUTEROL SULFATE 2.5; .5 MG/3ML; MG/3ML
3 SOLUTION RESPIRATORY (INHALATION)
Qty: 30 NEBULE | Refills: 1 | Status: SHIPPED | OUTPATIENT
Start: 2020-02-04 | End: 2021-11-23

## 2020-02-04 RX ADMIN — IPRATROPIUM BROMIDE AND ALBUTEROL SULFATE 3 ML: .5; 3 SOLUTION RESPIRATORY (INHALATION) at 10:41

## 2020-02-04 RX ADMIN — METHYLPREDNISOLONE SODIUM SUCCINATE 125 MG: 125 INJECTION, POWDER, FOR SOLUTION INTRAMUSCULAR; INTRAVENOUS at 10:41

## 2020-02-04 RX ADMIN — IPRATROPIUM BROMIDE AND ALBUTEROL SULFATE 3 ML: .5; 3 SOLUTION RESPIRATORY (INHALATION) at 09:59

## 2020-02-04 RX ADMIN — ALBUTEROL SULFATE 5 MG: 2.5 SOLUTION RESPIRATORY (INHALATION) at 12:22

## 2020-02-04 RX ADMIN — MAGNESIUM SULFATE HEPTAHYDRATE 2 G: 40 INJECTION, SOLUTION INTRAVENOUS at 10:41

## 2020-02-04 NOTE — ED TRIAGE NOTES
Patient with complaints of shortness of breath and wheezing that started lats night. Patient states that she is out of her inhalers.

## 2020-02-04 NOTE — ED NOTES
Pt states that she feels better and wants to go home. Gave a list of meds to [facility].  And requested that she send prescriptions to refill all her meds

## 2020-02-04 NOTE — ED NOTES
I have reviewed discharge instructions with the patient. The patient verbalized understanding.  Id band removed and shredded

## 2020-02-04 NOTE — ED PROVIDER NOTES
EMERGENCY DEPARTMENT HISTORY AND PHYSICAL EXAM    Date: 2/4/2020  Patient Name: Wilfredo Shaikh    History of Presenting Illness     Chief Complaint   Patient presents with    Shortness of Breath    Wheezing         History Provided By: Patient    9:57 AM  Wilfredo Shaikh is a 26 y.o. female with PMHX of asthma who presents to the emergency department C/O wheezing and shortness of breath which began last night. Patient ran out of her Symbicort, Combivent, albuterol inhaler and nebulizer solution 1 week ago. States this feels like a typical asthma exacerbation. Has never been intubated but has been hospitalized for asthma. Patient already states that she will not be admitted to the hospital, does not feel that bad. She has had relief with Solu-Medrol, magnesium and serial nebulizers in the past.  Patient smokes cigarettes. She denies any illness, fever, chest pain, and any other sxs or complaints. PCP: Scott Trammell MD    Current Outpatient Medications   Medication Sig Dispense Refill    budesonide-formoteroL (SYMBICORT) 160-4.5 mcg/actuation HFAA Take 2 Puffs by inhalation two (2) times a day. 1 Inhaler 0    ipratropium-albuteroL (COMBIVENT RESPIMAT)  mcg/actuation inhaler Take 1 Puff by inhalation every six (6) hours. 1 Inhaler 0    albuterol-ipratropium (DUO-NEB) 2.5 mg-0.5 mg/3 ml nebu 3 mL by Nebulization route every four (4) hours as needed (sob). 30 Nebule 1    albuterol (PROVENTIL HFA, VENTOLIN HFA, PROAIR HFA) 90 mcg/actuation inhaler Take 2 Puffs by inhalation every four (4) hours as needed for Wheezing. 1 Inhaler 0    methylPREDNISolone (MEDROL, GHANSHYAM,) 4 mg tablet Use per pack instructions.  1 Dose Pack 0       Past History     Past Medical History:  Past Medical History:   Diagnosis Date    Asthma     Ill-defined condition     ovarian cyst    Neurological disorder     migraines       Past Surgical History:  Past Surgical History:   Procedure Laterality Date    HX GYN  x 2, btl       Family History:  History reviewed. No pertinent family history. Social History:  Social History     Tobacco Use    Smoking status: Current Every Day Smoker     Packs/day: 0.25    Smokeless tobacco: Never Used   Substance Use Topics    Alcohol use: Yes     Comment: rarely    Drug use: No       Allergies: Allergies   Allergen Reactions    Latex Rash    Ativan [Lorazepam] Other (comments)     Aggitation    Phenergan [Promethazine] Rash    Zoloft [Sertraline] Rash         Review of Systems   Review of Systems   Constitutional: Negative for fever. Respiratory: Positive for cough, shortness of breath and wheezing. Cardiovascular: Negative for chest pain. All other systems reviewed and are negative. Physical Exam     Vitals:    20 0952 20 1222 20 1247 20 1251   BP: 128/76 128/76 145/86    Pulse: (!) 126 (!) 120 (!) 115    Resp: 20  16    Temp: 98.2 °F (36.8 °C)      SpO2: 90%  94% 94%   Weight: 86.2 kg (190 lb)      Height: 5' 5\" (1.651 m)        Physical Exam  Vital signs and nursing notes reviewed. CONSTITUTIONAL: Alert. Nontoxic appearing, well-nourished female in mild-mod respiratory distress, speaking in short sentences. HEAD: Normocephalic; atraumatic. EYES: PERRL; Conjunctiva clear. ENT: Moist mucus membranes. NECK: Supple; FROM without difficulty, non-tender; no cervical lymphadenopathy. CV: Normal S1, S2; no murmurs, rubs, or gallops. No chest wall tenderness. RESPIRATORY: Mild to moderate respiratory distress, speaking in short sentences. Expiratory wheezes bilaterally  SKIN: Normal for age and race; warm; dry; good turgor; no apparent lesions or exudate. NEURO: A & O x3. PSYCH:  Mood and affect appropriate.            Diagnostic Study Results     Labs -     Recent Results (from the past 12 hour(s))   CBC WITH AUTOMATED DIFF    Collection Time: 20 10:15 AM   Result Value Ref Range    WBC 9.8 4.6 - 13.2 K/uL RBC 5.69 (H) 4.20 - 5.30 M/uL    HGB 17.6 (H) 12.0 - 16.0 g/dL    HCT 50.2 (H) 35.0 - 45.0 %    MCV 88.2 74.0 - 97.0 FL    MCH 30.9 24.0 - 34.0 PG    MCHC 35.1 31.0 - 37.0 g/dL    RDW 13.0 11.6 - 14.5 %    PLATELET 079 617 - 461 K/uL    MPV 9.7 9.2 - 11.8 FL    NEUTROPHILS 60 40 - 73 %    LYMPHOCYTES 29 21 - 52 %    MONOCYTES 5 3 - 10 %    EOSINOPHILS 5 0 - 5 %    BASOPHILS 1 0 - 2 %    ABS. NEUTROPHILS 6.0 1.8 - 8.0 K/UL    ABS. LYMPHOCYTES 2.8 0.9 - 3.6 K/UL    ABS. MONOCYTES 0.5 0.05 - 1.2 K/UL    ABS. EOSINOPHILS 0.5 (H) 0.0 - 0.4 K/UL    ABS. BASOPHILS 0.1 0.0 - 0.1 K/UL    DF AUTOMATED     METABOLIC PANEL, BASIC    Collection Time: 02/04/20 10:15 AM   Result Value Ref Range    Sodium 139 136 - 145 mmol/L    Potassium 4.3 3.5 - 5.5 mmol/L    Chloride 110 100 - 111 mmol/L    CO2 27 21 - 32 mmol/L    Anion gap 2 (L) 3.0 - 18 mmol/L    Glucose 86 74 - 99 mg/dL    BUN 16 7.0 - 18 MG/DL    Creatinine 0.89 0.6 - 1.3 MG/DL    BUN/Creatinine ratio 18 12 - 20      GFR est AA >60 >60 ml/min/1.73m2    GFR est non-AA >60 >60 ml/min/1.73m2    Calcium 8.9 8.5 - 10.1 MG/DL       Radiologic Studies -   No orders to display     CT Results  (Last 48 hours)    None        CXR Results  (Last 48 hours)    None          Medications given in the ED-  Medications   albuterol-ipratropium (DUO-NEB) 2.5 MG-0.5 MG/3 ML (3 mL Nebulization Given 2/4/20 4956)   magnesium sulfate 2 g/50 ml IVPB (premix or compounded) (0 g IntraVENous IV Completed 2/4/20 1133)   methylPREDNISolone (PF) (Solu-MEDROL) injection 125 mg (125 mg IntraVENous Given 2/4/20 1041)   albuterol-ipratropium (DUO-NEB) 2.5 MG-0.5 MG/3 ML (3 mL Nebulization Given 2/4/20 1041)   albuterol (PROVENTIL VENTOLIN) nebulizer solution 5 mg (5 mg Nebulization Given 2/4/20 1222)         Medical Decision Making   I am the first provider for this patient.     I reviewed the vital signs, available nursing notes, past medical history, past surgical history, family history and social history. Vital Signs-Reviewed the patient's vital signs. Records Reviewed: Nursing Notes      Procedures:  Procedures    ED Course:  9:57 AM   Initial assessment performed. The patients presenting problems have been discussed, and they are in agreement with the care plan formulated and outlined with them. I have encouraged them to ask questions as they arise throughout their visit. FACE-TO-FACE PROGRESS NOTE:  The patient presents with shortness of breath, wheezing. History of asthma nicotine dependence  My exam shows a young female who is able to speak in full sentences. However with moderate to severe scattered expiratory wheezes and hypoxic in the low 90s. Imp/plan: Solu-Medrol, magnesium, DuoNeb. Will reassess after treatment. SMOKING CESSATION:  The patient was counseled on the dangers of tobacco use, and was advised to quit. Reviewed strategies to maximize success, including removing cigarettes and smoking materials from environment. Discussion took 3-5 minutes, and pt expressed understanding. I have personally seen and examined the patient, reviewed the DYANA's note and agree with findings and plan. Cordell Garcia, DO    10:05 AM progress note  Patient refused EKG and states does not want chest x-ray as this is her typical asthma exacerbation. 1 PM progress note  After multiple nebulizer treatments, Solu-Medrol and mag, patient states she feels \"100 times better\" and is ready to go home. She declines need for any further breathing treatments or admission as she states she is always wheezing and is back to her baseline. She still has wheezing on exam, mildly tachycardic but SPO2 94% on room air which patient states is her baseline. Is talking in full sentences in no distress.   Will refill her medications and discussed strict return precautions        Diagnosis and Disposition       1:04 PM  I have spent 45 minutes of critical care time involved in lab review, consultations with specialist, family decision-making, and documentation. During this entire length of time I was immediately available to the patient. Critical Care: The reason for providing this level of medical care for this critically ill patient was due a critical illness that impaired one or more vital organ systems such that there was a high probability of imminent or life threatening deterioration in the patients condition. This care involved high complexity decision making to assess, manipulate, and support vital system functions, to treat this degreee vital organ system failure and to prevent further life threatening deterioration of the patients condition. DISCHARGE NOTE:    Patricia Carrasquillo's  results have been reviewed with her. She has been counseled regarding her diagnosis, treatment, and plan. She verbally conveys understanding and agreement of the signs, symptoms, diagnosis, treatment and prognosis and additionally agrees to follow up as discussed. She also agrees with the care-plan and conveys that all of her questions have been answered. I have also provided discharge instructions for her that include: educational information regarding their diagnosis and treatment, and list of reasons why they would want to return to the ED prior to their follow-up appointment, should her condition change. She has been provided with education for proper emergency department utilization. CLINICAL IMPRESSION:    1. Severe persistent asthma with acute exacerbation        PLAN:  1. D/C Home  2. Current Discharge Medication List      START taking these medications    Details   methylPREDNISolone (MEDROL, GHANSHYAM,) 4 mg tablet Use per pack instructions. Qty: 1 Dose Pack, Refills: 0         CONTINUE these medications which have CHANGED    Details   budesonide-formoteroL (SYMBICORT) 160-4.5 mcg/actuation HFAA Take 2 Puffs by inhalation two (2) times a day.   Qty: 1 Inhaler, Refills: 0      ipratropium-albuteroL (COMBIVENT RESPIMAT)  mcg/actuation inhaler Take 1 Puff by inhalation every six (6) hours. Qty: 1 Inhaler, Refills: 0      albuterol-ipratropium (DUO-NEB) 2.5 mg-0.5 mg/3 ml nebu 3 mL by Nebulization route every four (4) hours as needed (sob). Qty: 30 Nebule, Refills: 1      albuterol (PROVENTIL HFA, VENTOLIN HFA, PROAIR HFA) 90 mcg/actuation inhaler Take 2 Puffs by inhalation every four (4) hours as needed for Wheezing. Qty: 1 Inhaler, Refills: 0           3. Follow-up Information     Follow up With Specialties Details Why Contact Info    Nelson Yoder MD Internal Medicine Schedule an appointment as soon as possible for a visit  [address] [facility] [id number]  [phone]      [facility] EMERGENCY DEPT Emergency Medicine  As needed, If symptoms worsen [address] [id number]  [phone]        _______________________________      Please note that this dictation was completed with [employer], the computer voice recognition software. Quite often unanticipated grammatical, syntax, homophones, and other interpretive errors are inadvertently transcribed by the computer software. Please disregard these errors. Please excuse any errors that have escaped final proofreading.

## 2020-02-04 NOTE — DISCHARGE INSTRUCTIONS

## 2020-02-21 ENCOUNTER — APPOINTMENT (OUTPATIENT)
Dept: GENERAL RADIOLOGY | Age: 30
DRG: 133 | End: 2020-02-21
Attending: EMERGENCY MEDICINE
Payer: COMMERCIAL

## 2020-02-21 ENCOUNTER — HOSPITAL ENCOUNTER (INPATIENT)
Age: 30
LOS: 1 days | Discharge: HOME | DRG: 133 | End: 2020-02-22
Attending: EMERGENCY MEDICINE | Admitting: FAMILY MEDICINE
Payer: COMMERCIAL

## 2020-02-21 DIAGNOSIS — J45.51 SEVERE PERSISTENT ASTHMA WITH ACUTE EXACERBATION: Primary | ICD-10-CM

## 2020-02-21 PROBLEM — J45.909 ASTHMA: Status: ACTIVE | Noted: 2020-02-21

## 2020-02-21 LAB
AMPHET UR QL SCN: NEGATIVE
ANION GAP SERPL CALC-SCNC: 8 MMOL/L (ref 3–18)
APPEARANCE UR: CLEAR
ARTERIAL PATENCY WRIST A: YES
BARBITURATES UR QL SCN: NEGATIVE
BASE DEFICIT BLD-SCNC: 6 MMOL/L
BASOPHILS # BLD: 0 K/UL (ref 0–0.1)
BASOPHILS NFR BLD: 0 % (ref 0–2)
BDY SITE: ABNORMAL
BENZODIAZ UR QL: NEGATIVE
BILIRUB UR QL: NEGATIVE
BUN SERPL-MCNC: 14 MG/DL (ref 7–18)
BUN/CREAT SERPL: 20 (ref 12–20)
CALCIUM SERPL-MCNC: 8.9 MG/DL (ref 8.5–10.1)
CANNABINOIDS UR QL SCN: POSITIVE
CHLORIDE SERPL-SCNC: 107 MMOL/L (ref 100–111)
CO2 SERPL-SCNC: 23 MMOL/L (ref 21–32)
COCAINE UR QL SCN: NEGATIVE
COLOR UR: YELLOW
CREAT SERPL-MCNC: 0.7 MG/DL (ref 0.6–1.3)
DIFFERENTIAL METHOD BLD: ABNORMAL
EOSINOPHIL # BLD: 0.6 K/UL (ref 0–0.4)
EOSINOPHIL NFR BLD: 4 % (ref 0–5)
ERYTHROCYTE [DISTWIDTH] IN BLOOD BY AUTOMATED COUNT: 13.7 % (ref 11.6–14.5)
FLUAV AG NPH QL IA: NEGATIVE
FLUBV AG NOSE QL IA: NEGATIVE
GAS FLOW.O2 O2 DELIVERY SYS: ABNORMAL L/MIN
GLUCOSE SERPL-MCNC: 82 MG/DL (ref 74–99)
GLUCOSE UR STRIP.AUTO-MCNC: NEGATIVE MG/DL
HCG SERPL QL: NEGATIVE
HCO3 BLD-SCNC: 20.1 MMOL/L (ref 22–26)
HCT VFR BLD AUTO: 46.8 % (ref 35–45)
HDSCOM,HDSCOM: ABNORMAL
HGB BLD-MCNC: 16.2 G/DL (ref 12–16)
HGB UR QL STRIP: NEGATIVE
KETONES UR QL STRIP.AUTO: NEGATIVE MG/DL
LEUKOCYTE ESTERASE UR QL STRIP.AUTO: NEGATIVE
LYMPHOCYTES # BLD: 2.9 K/UL (ref 0.9–3.6)
LYMPHOCYTES NFR BLD: 23 % (ref 21–52)
MAGNESIUM SERPL-MCNC: 1.9 MG/DL (ref 1.6–2.6)
MCH RBC QN AUTO: 31.1 PG (ref 24–34)
MCHC RBC AUTO-ENTMCNC: 34.6 G/DL (ref 31–37)
MCV RBC AUTO: 89.8 FL (ref 74–97)
METHADONE UR QL: NEGATIVE
MONOCYTES # BLD: 1.1 K/UL (ref 0.05–1.2)
MONOCYTES NFR BLD: 8 % (ref 3–10)
NEUTS SEG # BLD: 8.3 K/UL (ref 1.8–8)
NEUTS SEG NFR BLD: 65 % (ref 40–73)
NITRITE UR QL STRIP.AUTO: NEGATIVE
O2/TOTAL GAS SETTING VFR VENT: 40 %
OPIATES UR QL: NEGATIVE
PCO2 BLD: 38.7 MMHG (ref 35–45)
PCP UR QL: NEGATIVE
PEEP RESPIRATORY: 4 CMH2O
PH BLD: 7.32 [PH] (ref 7.35–7.45)
PH UR STRIP: 5.5 [PH] (ref 5–8)
PIP ISTAT,IPIP: 8
PLATELET # BLD AUTO: 237 K/UL (ref 135–420)
PMV BLD AUTO: 10.5 FL (ref 9.2–11.8)
PO2 BLD: 106 MMHG (ref 80–100)
POTASSIUM SERPL-SCNC: 4.1 MMOL/L (ref 3.5–5.5)
PROT UR STRIP-MCNC: NEGATIVE MG/DL
RBC # BLD AUTO: 5.21 M/UL (ref 4.2–5.3)
SAO2 % BLD: 98 % (ref 92–97)
SERVICE CMNT-IMP: ABNORMAL
SODIUM SERPL-SCNC: 138 MMOL/L (ref 136–145)
SP GR UR REFRACTOMETRY: 1.01 (ref 1–1.03)
SPECIMEN TYPE: ABNORMAL
SPONTANEOUS TIMED, IST: YES
TOTAL RESP. RATE, ITRR: 33
UROBILINOGEN UR QL STRIP.AUTO: 0.2 EU/DL (ref 0.2–1)
WBC # BLD AUTO: 12.9 K/UL (ref 4.6–13.2)

## 2020-02-21 PROCEDURE — 74011000250 HC RX REV CODE- 250: Performed by: FAMILY MEDICINE

## 2020-02-21 PROCEDURE — 94640 AIRWAY INHALATION TREATMENT: CPT

## 2020-02-21 PROCEDURE — 74011250637 HC RX REV CODE- 250/637: Performed by: FAMILY MEDICINE

## 2020-02-21 PROCEDURE — 74011250636 HC RX REV CODE- 250/636: Performed by: FAMILY MEDICINE

## 2020-02-21 PROCEDURE — 65660000000 HC RM CCU STEPDOWN

## 2020-02-21 PROCEDURE — 94660 CPAP INITIATION&MGMT: CPT

## 2020-02-21 PROCEDURE — 77030035694 HC MSK BIPAP FLL FAC PERFMAX PHIL -B

## 2020-02-21 PROCEDURE — 80307 DRUG TEST PRSMV CHEM ANLYZR: CPT

## 2020-02-21 PROCEDURE — 74011636637 HC RX REV CODE- 636/637: Performed by: EMERGENCY MEDICINE

## 2020-02-21 PROCEDURE — 5A09357 ASSISTANCE WITH RESPIRATORY VENTILATION, LESS THAN 24 CONSECUTIVE HOURS, CONTINUOUS POSITIVE AIRWAY PRESSURE: ICD-10-PCS | Performed by: FAMILY MEDICINE

## 2020-02-21 PROCEDURE — 87804 INFLUENZA ASSAY W/OPTIC: CPT

## 2020-02-21 PROCEDURE — 96372 THER/PROPH/DIAG INJ SC/IM: CPT

## 2020-02-21 PROCEDURE — 77030013140 HC MSK NEB VYRM -A

## 2020-02-21 PROCEDURE — 85025 COMPLETE CBC W/AUTO DIFF WBC: CPT

## 2020-02-21 PROCEDURE — 81003 URINALYSIS AUTO W/O SCOPE: CPT

## 2020-02-21 PROCEDURE — 83735 ASSAY OF MAGNESIUM: CPT

## 2020-02-21 PROCEDURE — 77010033678 HC OXYGEN DAILY

## 2020-02-21 PROCEDURE — 84703 CHORIONIC GONADOTROPIN ASSAY: CPT

## 2020-02-21 PROCEDURE — 87070 CULTURE OTHR SPECIMN AEROBIC: CPT

## 2020-02-21 PROCEDURE — 99285 EMERGENCY DEPT VISIT HI MDM: CPT

## 2020-02-21 PROCEDURE — 87077 CULTURE AEROBIC IDENTIFY: CPT

## 2020-02-21 PROCEDURE — 74011000250 HC RX REV CODE- 250: Performed by: EMERGENCY MEDICINE

## 2020-02-21 PROCEDURE — 80048 BASIC METABOLIC PNL TOTAL CA: CPT

## 2020-02-21 PROCEDURE — 87185 SC STD ENZYME DETCJ PER NZM: CPT

## 2020-02-21 PROCEDURE — 36600 WITHDRAWAL OF ARTERIAL BLOOD: CPT

## 2020-02-21 PROCEDURE — 82803 BLOOD GASES ANY COMBINATION: CPT

## 2020-02-21 PROCEDURE — 71045 X-RAY EXAM CHEST 1 VIEW: CPT

## 2020-02-21 PROCEDURE — 74011250636 HC RX REV CODE- 250/636: Performed by: EMERGENCY MEDICINE

## 2020-02-21 RX ORDER — MAGNESIUM SULFATE HEPTAHYDRATE 40 MG/ML
2 INJECTION, SOLUTION INTRAVENOUS ONCE
Status: DISCONTINUED | OUTPATIENT
Start: 2020-02-21 | End: 2020-02-21

## 2020-02-21 RX ORDER — SODIUM CHLORIDE 0.9 % (FLUSH) 0.9 %
5-40 SYRINGE (ML) INJECTION EVERY 8 HOURS
Status: DISCONTINUED | OUTPATIENT
Start: 2020-02-21 | End: 2020-02-22 | Stop reason: HOSPADM

## 2020-02-21 RX ORDER — IPRATROPIUM BROMIDE AND ALBUTEROL SULFATE 2.5; .5 MG/3ML; MG/3ML
3 SOLUTION RESPIRATORY (INHALATION)
Status: COMPLETED | OUTPATIENT
Start: 2020-02-21 | End: 2020-02-21

## 2020-02-21 RX ORDER — EPINEPHRINE 1 MG/ML
0.3 INJECTION, SOLUTION, CONCENTRATE INTRAVENOUS
Status: DISCONTINUED | OUTPATIENT
Start: 2020-02-21 | End: 2020-02-21

## 2020-02-21 RX ORDER — SODIUM CHLORIDE 0.9 % (FLUSH) 0.9 %
5-40 SYRINGE (ML) INJECTION AS NEEDED
Status: DISCONTINUED | OUTPATIENT
Start: 2020-02-21 | End: 2020-02-22 | Stop reason: HOSPADM

## 2020-02-21 RX ORDER — LORAZEPAM 2 MG/ML
1 INJECTION INTRAMUSCULAR
Status: DISCONTINUED | OUTPATIENT
Start: 2020-02-21 | End: 2020-02-21

## 2020-02-21 RX ORDER — EPINEPHRINE 1 MG/ML
0.3 INJECTION, SOLUTION, CONCENTRATE INTRAVENOUS
Status: COMPLETED | OUTPATIENT
Start: 2020-02-21 | End: 2020-02-21

## 2020-02-21 RX ORDER — LORAZEPAM 2 MG/ML
1 INJECTION INTRAMUSCULAR ONCE
Status: COMPLETED | OUTPATIENT
Start: 2020-02-21 | End: 2020-02-21

## 2020-02-21 RX ORDER — NALOXONE HYDROCHLORIDE 0.4 MG/ML
0.4 INJECTION, SOLUTION INTRAMUSCULAR; INTRAVENOUS; SUBCUTANEOUS AS NEEDED
Status: DISCONTINUED | OUTPATIENT
Start: 2020-02-21 | End: 2020-02-22 | Stop reason: HOSPADM

## 2020-02-21 RX ORDER — IPRATROPIUM BROMIDE AND ALBUTEROL SULFATE 2.5; .5 MG/3ML; MG/3ML
3 SOLUTION RESPIRATORY (INHALATION)
Status: DISCONTINUED | OUTPATIENT
Start: 2020-02-21 | End: 2020-02-21

## 2020-02-21 RX ORDER — IBUPROFEN 200 MG
1 TABLET ORAL EVERY 24 HOURS
Status: DISCONTINUED | OUTPATIENT
Start: 2020-02-21 | End: 2020-02-22 | Stop reason: HOSPADM

## 2020-02-21 RX ORDER — PREDNISONE 20 MG/1
60 TABLET ORAL ONCE
Status: COMPLETED | OUTPATIENT
Start: 2020-02-21 | End: 2020-02-21

## 2020-02-21 RX ORDER — HYDROCODONE BITARTRATE AND ACETAMINOPHEN 5; 325 MG/1; MG/1
1 TABLET ORAL
Status: DISCONTINUED | OUTPATIENT
Start: 2020-02-21 | End: 2020-02-22 | Stop reason: HOSPADM

## 2020-02-21 RX ORDER — MAGNESIUM SULFATE HEPTAHYDRATE 40 MG/ML
2 INJECTION, SOLUTION INTRAVENOUS ONCE
Status: COMPLETED | OUTPATIENT
Start: 2020-02-21 | End: 2020-02-21

## 2020-02-21 RX ORDER — BUDESONIDE 0.5 MG/2ML
500 INHALANT ORAL
Status: DISCONTINUED | OUTPATIENT
Start: 2020-02-21 | End: 2020-02-22 | Stop reason: HOSPADM

## 2020-02-21 RX ORDER — IPRATROPIUM BROMIDE AND ALBUTEROL SULFATE 2.5; .5 MG/3ML; MG/3ML
3 SOLUTION RESPIRATORY (INHALATION)
Status: DISCONTINUED | OUTPATIENT
Start: 2020-02-22 | End: 2020-02-22 | Stop reason: HOSPADM

## 2020-02-21 RX ORDER — ACETAMINOPHEN 325 MG/1
650 TABLET ORAL
Status: DISCONTINUED | OUTPATIENT
Start: 2020-02-21 | End: 2020-02-22 | Stop reason: HOSPADM

## 2020-02-21 RX ORDER — ALBUTEROL SULFATE 0.83 MG/ML
10 SOLUTION RESPIRATORY (INHALATION)
Status: COMPLETED | OUTPATIENT
Start: 2020-02-21 | End: 2020-02-21

## 2020-02-21 RX ORDER — IPRATROPIUM BROMIDE AND ALBUTEROL SULFATE 2.5; .5 MG/3ML; MG/3ML
SOLUTION RESPIRATORY (INHALATION)
Status: DISPENSED
Start: 2020-02-21 | End: 2020-02-21

## 2020-02-21 RX ORDER — HEPARIN SODIUM 5000 [USP'U]/ML
5000 INJECTION, SOLUTION INTRAVENOUS; SUBCUTANEOUS EVERY 8 HOURS
Status: DISCONTINUED | OUTPATIENT
Start: 2020-02-21 | End: 2020-02-22 | Stop reason: HOSPADM

## 2020-02-21 RX ADMIN — IPRATROPIUM BROMIDE AND ALBUTEROL SULFATE 3 ML: .5; 3 SOLUTION RESPIRATORY (INHALATION) at 11:33

## 2020-02-21 RX ADMIN — Medication 10 ML: at 22:00

## 2020-02-21 RX ADMIN — PREDNISONE 60 MG: 20 TABLET ORAL at 11:55

## 2020-02-21 RX ADMIN — LORAZEPAM 1 MG: 2 INJECTION INTRAMUSCULAR; INTRAVENOUS at 17:13

## 2020-02-21 RX ADMIN — METHYLPREDNISOLONE SODIUM SUCCINATE 60 MG: 40 INJECTION, POWDER, FOR SOLUTION INTRAMUSCULAR; INTRAVENOUS at 18:44

## 2020-02-21 RX ADMIN — HEPARIN SODIUM 5000 UNITS: 5000 INJECTION INTRAVENOUS; SUBCUTANEOUS at 21:43

## 2020-02-21 RX ADMIN — BUDESONIDE 500 MCG: 0.5 INHALANT RESPIRATORY (INHALATION) at 21:13

## 2020-02-21 RX ADMIN — AZITHROMYCIN MONOHYDRATE 500 MG: 500 INJECTION, POWDER, LYOPHILIZED, FOR SOLUTION INTRAVENOUS at 21:41

## 2020-02-21 RX ADMIN — ALBUTEROL SULFATE 10 MG: 2.5 SOLUTION RESPIRATORY (INHALATION) at 12:37

## 2020-02-21 RX ADMIN — IPRATROPIUM BROMIDE AND ALBUTEROL SULFATE 3 ML: .5; 3 SOLUTION RESPIRATORY (INHALATION) at 11:32

## 2020-02-21 RX ADMIN — LORAZEPAM 1 MG: 2 INJECTION INTRAMUSCULAR; INTRAVENOUS at 18:43

## 2020-02-21 RX ADMIN — MAGNESIUM SULFATE HEPTAHYDRATE 2 G: 40 INJECTION, SOLUTION INTRAVENOUS at 17:01

## 2020-02-21 RX ADMIN — SODIUM CHLORIDE 1000 ML: 900 INJECTION, SOLUTION INTRAVENOUS at 12:47

## 2020-02-21 RX ADMIN — IPRATROPIUM BROMIDE AND ALBUTEROL SULFATE 3 ML: .5; 3 SOLUTION RESPIRATORY (INHALATION) at 11:41

## 2020-02-21 RX ADMIN — METHYLPREDNISOLONE SODIUM SUCCINATE 125 MG: 125 INJECTION, POWDER, FOR SOLUTION INTRAMUSCULAR; INTRAVENOUS at 21:41

## 2020-02-21 RX ADMIN — IPRATROPIUM BROMIDE AND ALBUTEROL SULFATE 3 ML: .5; 3 SOLUTION RESPIRATORY (INHALATION) at 21:13

## 2020-02-21 RX ADMIN — EPINEPHRINE 0.3 MG: 1 INJECTION, SOLUTION, CONCENTRATE INTRAVENOUS at 12:57

## 2020-02-21 NOTE — H&P
History & Physical    Patient: Ara Daniels MRN: [id number]  CSN: [id number]    YOB: 1990  Age: 26 y.o. Sex: female      DOA: 2/21/2020  Primary Care Provider:  Zaria Ibanez MD      Assessment/Plan   25 y/o male with past medical history of asthma migraine headaches, admitted for acute respiratory failure with hypoxia due to asthma exacerbation. Patient's hospital course has been complicated thus far by patient behavior, initially spent 6 hours in the emergency room being worked up because she refused multiple different aspects of her care including at one point IV line. Acute respiratory failure with hypoxia due to asthma exacerbation -improving, refused nasal cannula is on Venturi mask at this time maintaining her sats around 97 to 98%, at one point she was placed on BiPAP because she was having trouble maintaining her 02 sats, she was unable to tolerate BiPAP even with Ativan for sedation, pCO2 was obtained via ABG was 38.7    Asthma exacerbation -received IV magnesium, epinephrine, several 30-minute albuterol nebs and 60 mg of p.o. prednisone which was substituted for Solu-Medrol because patient initially refused IV line, admitting patient to telemetry, will order duo nebs every 4 hours as needed symptoms, budesonide nebs every 12 hours, Solu-Medrol 60 mg IV every 6 hours, continue Venturi mask, initially consulted Dr. Federica Serrano as patient was going to the ICU to be maintained on BiPAP but patient refused BiPAP and her saturations were well enough to allow her to go to telemetry, to medicine we will consult as pulmonologist    Bronchitis vs bronchopneumonia -sputum culture obtained, will order IV azithromycin    History of migraine headaches -no clinical intervention needed at this time    DVT prophylaxis -Heparin      Patient Active Problem List   Diagnosis Code    Bronchitis J40    Asthma exacerbation J45. 901    Asthma J45.909       Estimated length of stay : 2-3 days    CC: Acute respiratory distress       HPI:     Jazmin Telles is a 26 y.o. female who past medical history of asthma and migraine headaches and past medical history of a pulmonary embolism in lower right lobe is not taking any anticoagulation at this time. She takes Symbicort, Combivent and DuoNebs for asthma. She has never been under the care of a pulmonologist.  This year she has had 1 admission for asthma exacerbation and 2-3 ER visits. In 2019, last year she had greater than 10 ER visits for asthma and one admission. She has never been intubated. Does not mine being intubated but would like for that to be the last resort. She feels like her asthma was triggered this time by her significant other's recent upper respiratory infection which she thinks she caught. When she presented to the emergency room she was in severe respiratory distress and tented to the emergency room with an oxygen saturation of 88% on room air. She was experiencing pursed lip breathing, wheeze work of breathing, tachypnea and tripoding. Has had a cough with productive sputum yellowish. Denies fever, chills or night sweats. Continues to smoke cigarettes and has smoked since she was 11years old. Only smoking 1/3-1/2 of pack a day, has not smoked in the last 2 days. Onset of her symptoms was 2 days ago and consisted of shortness of breath difficulty breathing, wheezing, increased work of breathing, difficulty talking and anxiousness. Past Medical History:   Diagnosis Date    Asthma     Ill-defined condition     ovarian cyst    Neurological disorder     migraines       Past Surgical History:   Procedure Laterality Date    HX GYN       x 2, btl       History reviewed. No pertinent family history.     Social History     Socioeconomic History    Marital status:      Spouse name: Not on file    Number of children: Not on file    Years of education: Not on file    Highest education level: Not on file Tobacco Use    Smoking status: Current Every Day Smoker     Packs/day: 0.25    Smokeless tobacco: Never Used   Substance and Sexual Activity    Alcohol use: Yes     Comment: rarely    Drug use: No       Prior to Admission medications    Medication Sig Start Date End Date Taking? Authorizing Provider   budesonide-formoteroL (SYMBICORT) 160-4.5 mcg/actuation HFAA Take 2 Puffs by inhalation two (2) times a day. 2/4/20   Kasie Mckeon PA   ipratropium-albuteroL (COMBIVENT RESPIMAT)  mcg/actuation inhaler Take 1 Puff by inhalation every six (6) hours. 2/4/20   DARIA Riojas   albuterol-ipratropium (DUO-NEB) 2.5 mg-0.5 mg/3 ml nebu 3 mL by Nebulization route every four (4) hours as needed (sob). 2/4/20   DARIA Riojas   albuterol (PROVENTIL HFA, VENTOLIN HFA, PROAIR HFA) 90 mcg/actuation inhaler Take 2 Puffs by inhalation every four (4) hours as needed for Wheezing. 2/4/20   DARIA Riojas   methylPREDNISolone (MEDROL, GHANSHYAM,) 4 mg tablet Use per pack instructions. 2/4/20   DARIA Riojas       Allergies   Allergen Reactions    Latex Rash    Ativan [Lorazepam] Other (comments)     Aggitation    Phenergan [Promethazine] Rash    Zoloft [Sertraline] Rash       Review of Systems  Gen: No fever, chills, malaise, weight loss/gain. Heent: No headache, rhinorrhea, epistaxis, ear pain, hearing loss, sinus pain, neck pain/stiffness, sore throat. Heart: No chest pain, palpitations, GLASER, pnd, or orthopnea. Resp: Reports cough, wheezing, shortness of breath, difficulty breathing  GI: No nausea, vomiting, diarrhea, constipation, melena or hematochezia. : No urinary obstruction, dysuria or hematuria. Derm: No rash, new skin lesion or pruritis. Musc/skeletal: no bone or joint complains. Vasc: No edema, cyanosis or claudication. Endo: No heat/cold intolerance, no polyuria,polydipsia or polyphagia. Neuro: No unilateral weakness, numbness, tingling. No seizures.    Heme: No easy bruising or bleeding. Physical Exam:     Physical Exam:  Visit Vitals  /74 (BP 1 Location: Left arm)   Pulse (!) 121   Temp 98.2 °F (36.8 °C)   Resp 26   Ht 5' 5\" (1.651 m)   Wt 77.1 kg (170 lb)   LMP 2020   SpO2 93%   BMI 28.29 kg/m²      O2 Device: Nasal cannula    Temp (24hrs), Av.3 °F (36.8 °C), Min:98.2 °F (36.8 °C), Max:98.3 °F (36.8 °C)     07 -  1900  In: 5146 [I.V.:1247]  Out: -    No intake/output data recorded. General:  Awake, cooperative, in moderate to severe respiratory distress, pursed lip breathing, tripoding, increase work of breathing, accessory muscle use   Head:  Normocephalic, without obvious abnormality, atraumatic. Eyes:  Conjunctivae/corneas clear, sclera anicteric, PERRL, EOMs intact. Nose: Nares normal. No drainage or sinus tenderness. Throat: Lips, mucosa, and tongue normal.    Neck: Supple, symmetrical, trachea midline, no adenopathy. Lungs:    Diminished breath sounds with expiratory wheezing, increased work of breathing, accessory muscle use, tripoding, pursed lip breathing, difficulty to speak in more than 1 or 2 word sentences       Heart:   Tachycardic rate and sinus rhythm, S1, S2 normal, no murmur, click, rub or gallop. Abdomen: Soft, non-tender. Bowel sounds normal. No masses,  No organomegaly. Extremities: Extremities normal, atraumatic, no cyanosis or edema. Capillary refill normal.   Pulses: 2+ and symmetric all extremities. Skin: Skin color pink, turgor normal. No rashes or lesions   Neurologic: CNII-XII intact. No focal motor or sensory deficit.        Labs Reviewed:  Recent Results (from the past 24 hour(s))   MAGNESIUM    Collection Time: 20 12:15 PM   Result Value Ref Range    Magnesium 1.9 1.6 - 2.6 mg/dL   METABOLIC PANEL, BASIC    Collection Time: 20 12:15 PM   Result Value Ref Range    Sodium 138 136 - 145 mmol/L    Potassium 4.1 3.5 - 5.5 mmol/L    Chloride 107 100 - 111 mmol/L    CO2 23 21 - 32 mmol/L    Anion gap 8 3.0 - 18 mmol/L    Glucose 82 74 - 99 mg/dL    BUN 14 7.0 - 18 MG/DL    Creatinine 0.70 0.6 - 1.3 MG/DL    BUN/Creatinine ratio 20 12 - 20      GFR est AA >60 >60 ml/min/1.73m2    GFR est non-AA >60 >60 ml/min/1.73m2    Calcium 8.9 8.5 - 10.1 MG/DL   CBC WITH AUTOMATED DIFF    Collection Time: 02/21/20 12:15 PM   Result Value Ref Range    WBC 12.9 4.6 - 13.2 K/uL    RBC 5.21 4.20 - 5.30 M/uL    HGB 16.2 (H) 12.0 - 16.0 g/dL    HCT 46.8 (H) 35.0 - 45.0 %    MCV 89.8 74.0 - 97.0 FL    MCH 31.1 24.0 - 34.0 PG    MCHC 34.6 31.0 - 37.0 g/dL    RDW 13.7 11.6 - 14.5 %    PLATELET 837 057 - 383 K/uL    MPV 10.5 9.2 - 11.8 FL    NEUTROPHILS 65 40 - 73 %    LYMPHOCYTES 23 21 - 52 %    MONOCYTES 8 3 - 10 %    EOSINOPHILS 4 0 - 5 %    BASOPHILS 0 0 - 2 %    ABS. NEUTROPHILS 8.3 (H) 1.8 - 8.0 K/UL    ABS. LYMPHOCYTES 2.9 0.9 - 3.6 K/UL    ABS. MONOCYTES 1.1 0.05 - 1.2 K/UL    ABS. EOSINOPHILS 0.6 (H) 0.0 - 0.4 K/UL    ABS.  BASOPHILS 0.0 0.0 - 0.1 K/UL    DF AUTOMATED     HCG QL SERUM    Collection Time: 02/21/20 12:15 PM   Result Value Ref Range    HCG, Ql. NEGATIVE  NEG     URINALYSIS W/ RFLX MICROSCOPIC    Collection Time: 02/21/20  1:33 PM   Result Value Ref Range    Color YELLOW      Appearance CLEAR      Specific gravity 1.008 1.005 - 1.030      pH (UA) 5.5 5.0 - 8.0      Protein NEGATIVE  NEG mg/dL    Glucose NEGATIVE  NEG mg/dL    Ketone NEGATIVE  NEG mg/dL    Bilirubin NEGATIVE  NEG      Blood NEGATIVE  NEG      Urobilinogen 0.2 0.2 - 1.0 EU/dL    Nitrites NEGATIVE  NEG      Leukocyte Esterase NEGATIVE  NEG     DRUG SCREEN, URINE    Collection Time: 02/21/20  1:33 PM   Result Value Ref Range    BENZODIAZEPINES NEGATIVE  NEG      BARBITURATES NEGATIVE  NEG      THC (TH-CANNABINOL) POSITIVE (A) NEG      OPIATES NEGATIVE  NEG      PCP(PHENCYCLIDINE) NEGATIVE  NEG      COCAINE NEGATIVE  NEG      AMPHETAMINES NEGATIVE  NEG      METHADONE NEGATIVE  NEG      HDSCOM (NOTE)        Procedures/imaging: see electronic medical records for all procedures/Xrays and details which were not copied into this note but were reviewed prior to creation of Plan          CC:  Elvi Barajas MD

## 2020-02-21 NOTE — ED NOTES
Attempted to started IV on patient. Patient refused at this time.  Patient states \" I will curse you out if you stick me right now\"

## 2020-02-21 NOTE — ED TRIAGE NOTES
Pt arrives ambulatory to ED with c\o wheezing, pt sts \"I can't breathe\", pt sts she has had s/sx x 2 days

## 2020-02-21 NOTE — ED NOTES
Patient refused Cardiac Monitoring at this time. This RN explained to Patient cardiac monitoring is required for IV magnesium due to risk of cardiac arrhthymias. Patient understands Magnesium IV will not be started without cardiac monitoring. Patient understands and continues to refused. Patient also refused IV start at this time.  MD Benja Neumann and Charge Nurse made aware

## 2020-02-21 NOTE — ED PROVIDER NOTES
EMERGENCY DEPARTMENT HISTORY AND PHYSICAL EXAM    Date: 2/21/2020  Patient Name: Juvencio Heredia    History of Presenting Illness     Chief Complaint   Patient presents with    Wheezing         History Provided By: Patient and Patient's     200  Juvencio Heredia is a 26 y.o. female with PMHX of asthma, tobacco use who presents to the emergency department C/O asthma attack. Patient reports bad asthma for the past 3 days. States moderate relief with duo nebs at home however is been persistent. She thinks this asthma tract was triggered by her significant other's recent URI which she caught. Took a small dose of prednisone prior to arrival.  No fever. Patient states she is never been intubated but has been in a \"medically induced coma\" before. Continues to smoke cigarettes. Patient currently refusing IV states he will make her more anxious    PCP: Rosa Luna MD    Current Facility-Administered Medications   Medication Dose Route Frequency Provider Last Rate Last Dose    magnesium sulfate 2 g/50 ml IVPB (premix or compounded)  2 g IntraVENous ONCE Mario Montana MD   Stopped at 02/21/20 1143     Current Outpatient Medications   Medication Sig Dispense Refill    budesonide-formoteroL (SYMBICORT) 160-4.5 mcg/actuation HFAA Take 2 Puffs by inhalation two (2) times a day. 1 Inhaler 0    ipratropium-albuteroL (COMBIVENT RESPIMAT)  mcg/actuation inhaler Take 1 Puff by inhalation every six (6) hours. 1 Inhaler 0    albuterol-ipratropium (DUO-NEB) 2.5 mg-0.5 mg/3 ml nebu 3 mL by Nebulization route every four (4) hours as needed (sob). 30 Nebule 1    albuterol (PROVENTIL HFA, VENTOLIN HFA, PROAIR HFA) 90 mcg/actuation inhaler Take 2 Puffs by inhalation every four (4) hours as needed for Wheezing. 1 Inhaler 0    methylPREDNISolone (MEDROL, GHANSHYAM,) 4 mg tablet Use per pack instructions.  1 Dose Pack 0       Past History     Past Medical History:  Past Medical History:   Diagnosis Date    Asthma     Ill-defined condition     ovarian cyst    Neurological disorder     migraines       Past Surgical History:  Past Surgical History:   Procedure Laterality Date    HX GYN       x 2, btl       Family History:  History reviewed. No pertinent family history. Social History:  Social History     Tobacco Use    Smoking status: Current Every Day Smoker     Packs/day: 0.25    Smokeless tobacco: Never Used   Substance Use Topics    Alcohol use: Yes     Comment: rarely    Drug use: No       Allergies: Allergies   Allergen Reactions    Latex Rash    Ativan [Lorazepam] Other (comments)     Aggitation    Phenergan [Promethazine] Rash    Zoloft [Sertraline] Rash         Review of Systems   Review of Systems   Constitutional: Negative for chills and fever. Respiratory: Positive for chest tightness, shortness of breath and wheezing. Psychiatric/Behavioral: The patient is nervous/anxious. All other systems reviewed and are negative. Physical Exam     Vitals:    20 1145 20 1202 20 1205 20 1237   BP:  134/83     Pulse:  91 (!) 138 (!) 140   Resp:       Temp:       SpO2: 93%  92%    Weight:       Height:         Physical Exam  Vitals signs and nursing note reviewed. Constitutional:       Appearance: Normal appearance. She is well-developed. Comments: Sitting upright in bed hunched over   HENT:      Head: Normocephalic and atraumatic. Eyes:      Conjunctiva/sclera: Conjunctivae normal.      Pupils: Pupils are equal, round, and reactive to light. Neck:      Musculoskeletal: Normal range of motion and neck supple. Cardiovascular:      Rate and Rhythm: Regular rhythm. Tachycardia present. Heart sounds: Normal heart sounds. Pulmonary:      Effort: Tachypnea, prolonged expiration and respiratory distress present. Breath sounds: Wheezing present. No rales. Comments: Speaking short sentances  Chest:      Chest wall: No tenderness.    Abdominal: General: There is no distension. Palpations: Abdomen is soft. Tenderness: There is no abdominal tenderness. There is no guarding or rebound. Musculoskeletal: Normal range of motion. General: No tenderness. Lymphadenopathy:      Cervical: No cervical adenopathy. Skin:     General: Skin is warm and dry. Neurological:      Mental Status: She is alert and oriented to person, place, and time. Cranial Nerves: No cranial nerve deficit. Motor: No abnormal muscle tone. Coordination: Coordination normal.   Psychiatric:         Mood and Affect: Mood is anxious. Affect is tearful. Behavior: Behavior normal.         Diagnostic Study Results     Labs -     Recent Results (from the past 12 hour(s))   MAGNESIUM    Collection Time: 02/21/20 12:15 PM   Result Value Ref Range    Magnesium 1.9 1.6 - 2.6 mg/dL   METABOLIC PANEL, BASIC    Collection Time: 02/21/20 12:15 PM   Result Value Ref Range    Sodium 138 136 - 145 mmol/L    Potassium 4.1 3.5 - 5.5 mmol/L    Chloride 107 100 - 111 mmol/L    CO2 23 21 - 32 mmol/L    Anion gap 8 3.0 - 18 mmol/L    Glucose 82 74 - 99 mg/dL    BUN 14 7.0 - 18 MG/DL    Creatinine 0.70 0.6 - 1.3 MG/DL    BUN/Creatinine ratio 20 12 - 20      GFR est AA >60 >60 ml/min/1.73m2    GFR est non-AA >60 >60 ml/min/1.73m2    Calcium 8.9 8.5 - 10.1 MG/DL   CBC WITH AUTOMATED DIFF    Collection Time: 02/21/20 12:15 PM   Result Value Ref Range    WBC 12.9 4.6 - 13.2 K/uL    RBC 5.21 4.20 - 5.30 M/uL    HGB 16.2 (H) 12.0 - 16.0 g/dL    HCT 46.8 (H) 35.0 - 45.0 %    MCV 89.8 74.0 - 97.0 FL    MCH 31.1 24.0 - 34.0 PG    MCHC 34.6 31.0 - 37.0 g/dL    RDW 13.7 11.6 - 14.5 %    PLATELET 543 435 - 182 K/uL    MPV 10.5 9.2 - 11.8 FL    NEUTROPHILS 65 40 - 73 %    LYMPHOCYTES 23 21 - 52 %    MONOCYTES 8 3 - 10 %    EOSINOPHILS 4 0 - 5 %    BASOPHILS 0 0 - 2 %    ABS. NEUTROPHILS 8.3 (H) 1.8 - 8.0 K/UL    ABS. LYMPHOCYTES 2.9 0.9 - 3.6 K/UL    ABS.  MONOCYTES 1.1 0.05 - 1.2 K/UL    ABS. EOSINOPHILS 0.6 (H) 0.0 - 0.4 K/UL    ABS. BASOPHILS 0.0 0.0 - 0.1 K/UL    DF AUTOMATED     HCG QL SERUM    Collection Time: 02/21/20 12:15 PM   Result Value Ref Range    HCG, Ql. NEGATIVE  NEG     URINALYSIS W/ RFLX MICROSCOPIC    Collection Time: 02/21/20  1:33 PM   Result Value Ref Range    Color YELLOW      Appearance CLEAR      Specific gravity 1.008 1.005 - 1.030      pH (UA) 5.5 5.0 - 8.0      Protein NEGATIVE  NEG mg/dL    Glucose NEGATIVE  NEG mg/dL    Ketone NEGATIVE  NEG mg/dL    Bilirubin NEGATIVE  NEG      Blood NEGATIVE  NEG      Urobilinogen 0.2 0.2 - 1.0 EU/dL    Nitrites NEGATIVE  NEG      Leukocyte Esterase NEGATIVE  NEG     DRUG SCREEN, URINE    Collection Time: 02/21/20  1:33 PM   Result Value Ref Range    BENZODIAZEPINES NEGATIVE  NEG      BARBITURATES NEGATIVE  NEG      THC (TH-CANNABINOL) POSITIVE (A) NEG      OPIATES NEGATIVE  NEG      PCP(PHENCYCLIDINE) NEGATIVE  NEG      COCAINE NEGATIVE  NEG      AMPHETAMINES NEGATIVE  NEG      METHADONE NEGATIVE  NEG      HDSCOM (NOTE)        Radiologic Studies -   XR CHEST PORT   Final Result   IMPRESSION:      No acute radiographic cardiopulmonary abnormality. CT Results  (Last 48 hours)    None        CXR Results  (Last 48 hours)               02/21/20 1228  XR CHEST PORT Final result    Impression:  IMPRESSION:       No acute radiographic cardiopulmonary abnormality. Narrative:  EXAM: XR CHEST PORT       CLINICAL INDICATION/HISTORY: severe asthma attack   -Additional: None       COMPARISON: 10/20/2019       TECHNIQUE: Frontal view of the chest       _______________       FINDINGS:       HEART AND MEDIASTINUM: Normal cardiac size and mediastinal contours. No   pneumomediastinum evident radiographically. LUNGS AND PLEURAL SPACES: No focal pneumonic consolidation, pneumothorax, or   pleural effusion.        BONY THORAX AND SOFT TISSUES: No acute osseous abnormality       _______________                 Medications given in the ED-  Medications   magnesium sulfate 2 g/50 ml IVPB (premix or compounded) (2 g IntraVENous Refused 2/21/20 1200)   albuterol-ipratropium (DUO-NEB) 2.5 MG-0.5 MG/3 ML (3 mL Nebulization Given 2/21/20 1141)   albuterol-ipratropium (DUO-NEB) 2.5 MG-0.5 MG/3 ML (3 mL Nebulization Given 2/21/20 1133)   albuterol-ipratropium (DUO-NEB) 2.5 MG-0.5 MG/3 ML (3 mL Nebulization Given 2/21/20 1132)   predniSONE (DELTASONE) tablet 60 mg (60 mg Oral Given 2/21/20 1155)   albuterol (PROVENTIL VENTOLIN) nebulizer solution 10 mg (10 mg Nebulization Given 2/21/20 1237)   sodium chloride 0.9 % bolus infusion 1,000 mL (1,000 mL IntraVENous New Bag 2/21/20 1247)   EPINEPHrine HCl (PF) (ADRENALIN) 1 mg/mL (1 mL) injection 0.3 mg (0.3 mg IntraMUSCular Given 2/21/20 1257)         Medical Decision Making   I am the first provider for this patient. I reviewed the vital signs, available nursing notes, past medical history, past surgical history, family history and social history. Vital Signs-Reviewed the patient's vital signs. Pulse Oximetry Analysis - 88% on RA     Cardiac Monitor:  Rate: 96 bpm  Rhythm: nebulizer    Records Reviewed: Nursing Notes and Old Medical Records    Provider Notes (Medical Decision Making): Angelita Dumont is a 26 y.o. female presents with moderate severe asthma exacerbation    Procedures:  Procedures    ED Course:   11:54 AM  Patient receiving nebs however she is refusing IV placement. She is also refusing cardiac monitoring. Patient alert and oriented x4 understands and has the capacity to refuse care. She has been trying to dictate care terms    12:34 PM  Reevaluate patient after triple nebs, prednisone. Patient was eventually agreeable to IV access and receiving IV magnesium. Patient still has tachypnea, pursed lip breathing, speaking short sentences. Pulse ox is 88% on room air. Patient has repeatedly stated she will will not stay in the hospital or agree to admission.   I had coming to earth talk with her stating that patients like her at incredible risk for severe asthma attacks and death that patients with poorly controlled and severe asthma like her die every day. She still is not agreeable to staying but will stay for further treatment she is agreed to IM epi. Continuous Alubuterol ordered. 3:42 PM  Patient still short of breath with pursed lips at rest.  States when she gets up to move around she feels incredibly short of breath. She is now agreeable to being admitted to the hospital and I will place a consult for the hospitalist.    CONSULT NOTE:   4:10 PM   I discussed care with Dr Carlos Lou It was a standard discussion, including history of patients chief complaint, available diagnostic results, and treatment course. Discussed case. Will admit for severe asthma exacerbation    5:23 PM  Patient has been very difficult and not cooperative with care plan refusing nasal cannula and cardiac monitoring. Stable on Venturi mask. Awaiting transfer to floor. Diagnosis and Disposition     4:10 PM  I have spent 35 minutes of critical care time involved in lab review, consultations with specialist, family decision-making, and documentation. During this entire length of time I was immediately available to the patient. Critical Care: The reason for providing this level of medical care for this critically ill patient was due a critical illness that impaired one or more vital organ systems such that there was a high probability of imminent or life threatening deterioration in the patients condition. This care involved high complexity decision making to assess, manipulate, and support vital system functions, to treat this degreee vital organ system failure and to prevent further life threatening deterioration of the patients condition. Core Measures:  For Hospitalized Patients:    1.  Hospitalization Decision Time:  The decision to hospitalize the patient was made by Dr Grzegorz Hyman at 2814 on 2/21/2020    2. Aspirin: Aspirin was not given because the patient did not present with a stroke at the time of their Emergency Department evaluation    3:42 PM  Patient is being admitted to the hospital by Dr Eliel Wang . The results of their tests and reasons for their admission have been discussed with them and/or available family. They convey agreement and understanding for the need to be admitted and for their admission diagnosis. CONDITIONS ON ADMISSION:  Sepsis is not present at the time of admission. Deep Vein Thrombosis is not present at the time of admission. Thrombosis is not present at the time of admission. Urinary Tract Infection is not present at the time of admission. Pneumonia is not present at the time of admission. MRSA is not present at the time of admission. Wound infection is not present at the time of admission. Pressure Ulcer is not present at the time of admission. CLINICAL IMPRESSION:    1. Severe persistent asthma with acute exacerbation      _______________________________      Please note that this dictation was completed with [employer], the computer voice recognition software. Quite often unanticipated grammatical, syntax, homophones, and other interpretive errors are inadvertently transcribed by the computer software. Please disregard these errors. Please excuse any errors that have escaped final proofreading.

## 2020-02-21 NOTE — PROGRESS NOTES
PATIENT STATES THAT SHE CANNOT TOLERATE ANY TYPE OF NASAL CANNULA EVEN IF HUMIDITY IS PROVIDED. SAYS SHE IS UNABLE TO BREATHE THROUGH HER NOSE. REMOVED HER FROM NRB AND CHANGED HER TO 50% VM. SPO2 92% ON SAME. DOES DESATURATE QUICKLY WHEN MOVING FROM BED TO COMMODE ACCORDING TO RN.

## 2020-02-22 VITALS
TEMPERATURE: 98 F | WEIGHT: 190.3 LBS | OXYGEN SATURATION: 93 % | HEIGHT: 65 IN | DIASTOLIC BLOOD PRESSURE: 80 MMHG | RESPIRATION RATE: 16 BRPM | HEART RATE: 100 BPM | SYSTOLIC BLOOD PRESSURE: 136 MMHG | BODY MASS INDEX: 31.71 KG/M2

## 2020-02-22 LAB
ANION GAP SERPL CALC-SCNC: 9 MMOL/L (ref 3–18)
BUN SERPL-MCNC: 12 MG/DL (ref 7–18)
BUN/CREAT SERPL: 20 (ref 12–20)
CALCIUM SERPL-MCNC: 8.6 MG/DL (ref 8.5–10.1)
CHLORIDE SERPL-SCNC: 106 MMOL/L (ref 100–111)
CO2 SERPL-SCNC: 23 MMOL/L (ref 21–32)
CREAT SERPL-MCNC: 0.61 MG/DL (ref 0.6–1.3)
ERYTHROCYTE [DISTWIDTH] IN BLOOD BY AUTOMATED COUNT: 13.3 % (ref 11.6–14.5)
GLUCOSE SERPL-MCNC: 125 MG/DL (ref 74–99)
HCT VFR BLD AUTO: 46.2 % (ref 35–45)
HGB BLD-MCNC: 16 G/DL (ref 12–16)
MCH RBC QN AUTO: 30.8 PG (ref 24–34)
MCHC RBC AUTO-ENTMCNC: 34.6 G/DL (ref 31–37)
MCV RBC AUTO: 89 FL (ref 74–97)
PLATELET # BLD AUTO: 270 K/UL (ref 135–420)
PMV BLD AUTO: 10 FL (ref 9.2–11.8)
POTASSIUM SERPL-SCNC: 5 MMOL/L (ref 3.5–5.5)
RBC # BLD AUTO: 5.19 M/UL (ref 4.2–5.3)
SODIUM SERPL-SCNC: 138 MMOL/L (ref 136–145)
WBC # BLD AUTO: 11.4 K/UL (ref 4.6–13.2)

## 2020-02-22 PROCEDURE — 85027 COMPLETE CBC AUTOMATED: CPT

## 2020-02-22 PROCEDURE — 80048 BASIC METABOLIC PNL TOTAL CA: CPT

## 2020-02-22 PROCEDURE — 36415 COLL VENOUS BLD VENIPUNCTURE: CPT

## 2020-02-22 PROCEDURE — 94640 AIRWAY INHALATION TREATMENT: CPT

## 2020-02-22 PROCEDURE — 77010033678 HC OXYGEN DAILY

## 2020-02-22 PROCEDURE — 74011250637 HC RX REV CODE- 250/637: Performed by: FAMILY MEDICINE

## 2020-02-22 PROCEDURE — 74011000250 HC RX REV CODE- 250: Performed by: FAMILY MEDICINE

## 2020-02-22 PROCEDURE — 74011250636 HC RX REV CODE- 250/636: Performed by: FAMILY MEDICINE

## 2020-02-22 PROCEDURE — 94760 N-INVAS EAR/PLS OXIMETRY 1: CPT

## 2020-02-22 RX ORDER — GUAIFENESIN 600 MG/1
600 TABLET, EXTENDED RELEASE ORAL 2 TIMES DAILY
Qty: 20 TAB | Refills: 0 | Status: SHIPPED | OUTPATIENT
Start: 2020-02-22 | End: 2020-03-03

## 2020-02-22 RX ORDER — CODEINE SULFATE 15 MG/1
30 TABLET ORAL ONCE
Status: COMPLETED | OUTPATIENT
Start: 2020-02-22 | End: 2020-02-22

## 2020-02-22 RX ORDER — PREDNISONE 10 MG/1
TABLET ORAL
Qty: 21 TAB | Refills: 0 | Status: SHIPPED | OUTPATIENT
Start: 2020-02-22 | End: 2020-03-18

## 2020-02-22 RX ADMIN — HEPARIN SODIUM 5000 UNITS: 5000 INJECTION INTRAVENOUS; SUBCUTANEOUS at 05:55

## 2020-02-22 RX ADMIN — IPRATROPIUM BROMIDE AND ALBUTEROL SULFATE 3 ML: .5; 3 SOLUTION RESPIRATORY (INHALATION) at 04:37

## 2020-02-22 RX ADMIN — METHYLPREDNISOLONE SODIUM SUCCINATE 125 MG: 125 INJECTION, POWDER, FOR SOLUTION INTRAMUSCULAR; INTRAVENOUS at 05:55

## 2020-02-22 RX ADMIN — Medication 10 ML: at 05:56

## 2020-02-22 RX ADMIN — Medication 10 ML: at 13:39

## 2020-02-22 RX ADMIN — METHYLPREDNISOLONE SODIUM SUCCINATE 125 MG: 125 INJECTION, POWDER, FOR SOLUTION INTRAMUSCULAR; INTRAVENOUS at 14:35

## 2020-02-22 RX ADMIN — BUDESONIDE 500 MCG: 0.5 INHALANT RESPIRATORY (INHALATION) at 11:11

## 2020-02-22 RX ADMIN — IPRATROPIUM BROMIDE AND ALBUTEROL SULFATE 3 ML: .5; 3 SOLUTION RESPIRATORY (INHALATION) at 11:10

## 2020-02-22 RX ADMIN — IPRATROPIUM BROMIDE AND ALBUTEROL SULFATE 3 ML: .5; 3 SOLUTION RESPIRATORY (INHALATION) at 00:44

## 2020-02-22 RX ADMIN — CODEINE SULFATE 30 MG: 15 TABLET ORAL at 00:51

## 2020-02-22 RX ADMIN — HYDROCODONE BITARTRATE AND ACETAMINOPHEN 1 TABLET: 5; 325 TABLET ORAL at 09:32

## 2020-02-22 NOTE — ED NOTES
Called floor pt receiving nurse in report;  Left message to have nurse call me back when she can read notes and review chart;  Pt laying on strecher in in mild resp distress; Pt on venturi mask and tolerating at this time;  Vss;  Pt  attentive and at bedside;  Call bell within reach;   Pt made verbal agreement with nurse to keep venti mask on;

## 2020-02-22 NOTE — PROGRESS NOTES
2045. Patient arrived from ED via w/c. Patient is alert and oriented, venturi mask is on. Patient is in respiratory distress, speaks in short sentences, RR 22, lungs with expiratory and inspiratory wheezing throughout all lungs fields, suprasternal retractions noted.  at bedside, states he sees some improvement. Patient states she does not feel better. RT paged.    2120. RT at bedside, states she would decrease venturi mask 02 to 40%.     2130. Dr Nancy Stroud at bedside, patient instructed to remain NPO, risk of aspiration explained to patient in case intubation needed. Patient states she is hungry and going to eat. 0030. Pt asked for a cough medicine, states cough makes her more SOB. Dr Nancy Stroud made aware. [id number]. Patient resting quietly in bed with eyes closed. Call bell within reach. 5389. Pt states she is feeling better. 0700. Bedside and Verbal shift change report given to [facility]. RN (oncoming nurse) by Celine Raman RN (offgoing nurse). Report included the following information SBAR, Intake/Output, MAR and Recent Results.

## 2020-02-22 NOTE — PROGRESS NOTES
Reason for Admission:   Chart reviewed as CM on call; patient is a 26 yr old female with past medical hx of asthma, migraine headaches, pulmonary embolism presented to ED c/o exposure to significant other's respiratory infection and was in severe respiratory distress with sats of 88% on room air, pursed lip breathing, tachypnea and tripoding. Was refusing care in ED which delayed respiratory support; continues to smoke 1/3-1/2 pack of cigaretes/day but has not smoked x last two days. RUR Score:    Low, 16%                 Plan for utilizing home health:      none                    Current Advanced Directive/Advance Care Plan: full code                         Transition of Care Plan:  Met with patient at bedside to explain CM role; per patent she lives w/ , states only DME is her nebulizer and does not require any HH or other DME; stated she is breathing much easier now. CM to follow as needed for discharge needs. Care Management Interventions  PCP Verified by CM:  Yes  Palliative Care Criteria Met (RRAT>21 & CHF Dx)?: No  Mode of Transport at Discharge: Self  Transition of Care Consult (CM Consult): Discharge Planning  Current Support Network: Lives with Spouse, Own Home  Confirm Follow Up Transport: Family  The Plan for Transition of Care is Related to the Following Treatment Goals : home when medically stable  The Patient and/or Patient Representative was Provided with a Choice of Provider and Agrees with the Discharge Plan?: Yes  Discharge Location  Discharge Placement: Home

## 2020-02-22 NOTE — ROUTINE PROCESS
TRANSFER - IN REPORT: 
 
Verbal report received from [facility] on [employer]  being received from ED for routine progression of care Report consisted of patients Situation, Background, Assessment and  
Recommendations(SBAR). Information from the following report(s) SBAR, Kardex, ED Summary, Intake/Output, MAR and Recent Results was reviewed with the receiving nurse. Opportunity for questions and clarification was provided. Assessment completed upon patients arrival to unit and care assumed.

## 2020-02-22 NOTE — PROGRESS NOTES
PATIENT DECLINED 0800 NEB TX. SHE IS CURRENTLY ON ROOM AIR WITH SPO2 92%. NO RESPIRATORY DISTRESS NOTED.

## 2020-02-22 NOTE — PROGRESS NOTES
Patient discharged via wheelchair with discharge instructions. Spouse accompanying patient and to transport patient home. Discharge instructions explained with patient and all IV removed. I have reviewed discharge instructions with the patient and spouse. The patient and spouse verbalized understanding.

## 2020-02-22 NOTE — PROGRESS NOTES
Problem: Falls - Risk of  Goal: *Absence of Falls  Description  Document Khoa Weinberg Fall Risk and appropriate interventions in the flowsheet.   Outcome: Progressing Towards Goal  Note: Fall Risk Interventions:            Medication Interventions: Teach patient to arise slowly, Patient to call before getting OOB                   Problem: Patient Education: Go to Patient Education Activity  Goal: Patient/Family Education  Outcome: Progressing Towards Goal     Problem: Patient Education:  Go to Education Activity  Goal: Patient/Family Education  Outcome: Progressing Towards Goal     Problem: Asthma: Day 1  Goal: Off Pathway (Use only if patient is Off Pathway)  Outcome: Progressing Towards Goal  Goal: Activity/Safety  Outcome: Progressing Towards Goal  Goal: Consults, if ordered  Outcome: Progressing Towards Goal  Goal: Diagnostic Test/Procedures  Outcome: Progressing Towards Goal  Goal: Nutrition/Diet  Outcome: Progressing Towards Goal  Goal: Discharge Planning  Outcome: Progressing Towards Goal  Goal: Medications  Outcome: Progressing Towards Goal  Goal: Respiratory  Outcome: Progressing Towards Goal  Goal: Treatments/Interventions/Procedures  Outcome: Progressing Towards Goal  Goal: Psychosocial  Outcome: Progressing Towards Goal  Goal: *Oxygen saturation returns to baseline  Outcome: Progressing Towards Goal  Goal: *Vital signs within defined limits  Outcome: Progressing Towards Goal  Goal: *Labs within defined limits  Outcome: Progressing Towards Goal

## 2020-02-22 NOTE — ED NOTES
TRANSFER - OUT REPORT:    Verbal report given Marie Barney RN on Mordecai Meigs  being transferred to tele(unit) for routine progression of care       Report consisted of patients Situation, Background, Assessment and   Recommendations(SBAR). Information from the following report(s) SBAR, ED Summary, [facility] and Cardiac Rhythm ST was reviewed with the receiving nurse. Lines:   Peripheral IV 02/21/20 Left Hand (Active)   Site Assessment Clean, dry, & intact 2/21/2020  5:04 PM   Phlebitis Assessment 0 2/21/2020  5:04 PM   Hub Color/Line Status Pink 2/21/2020  5:04 PM        Opportunity for questions and clarification was provided.       Patient transported with:   Monitor  O2 @ VENTI MASK liters  Tech          2

## 2020-02-22 NOTE — PROGRESS NOTES
RN paged that pt was more tachypneic. I increased solumedrol dose to 125 mg IV q8 hrs and changed duonebs from q6 to q4 hrs. I also made her NPO as she refuses nasal cannula, high flow, or BiPAP. I spoke with her and she insists on eating a large [employer] meal despite my education that she is a severe aspiration risk if she requires intubation. 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 \" And \"I'll just leave if I'm not allowed to eat. \" I explained that if she chose to eat she is accepting the risk of aspiration in the event that she worsens and she accepts that risk. NPO order maintained.

## 2020-02-22 NOTE — PROGRESS NOTES
Pulse oximetry assessment   93% at rest on room air (if 88% or less, skip next steps)  93% while ambulating on room air

## 2020-02-25 LAB
BACTERIA SPEC CULT: ABNORMAL
BACTERIA SPEC CULT: ABNORMAL
GRAM STN SPEC: ABNORMAL
SERVICE CMNT-IMP: ABNORMAL

## 2020-03-17 ENCOUNTER — HOSPITAL ENCOUNTER (EMERGENCY)
Age: 30
Discharge: HOME | End: 2020-03-18
Attending: EMERGENCY MEDICINE
Payer: MEDICAID

## 2020-03-17 DIAGNOSIS — J45.51 SEVERE PERSISTENT ASTHMA WITH ACUTE EXACERBATION: Primary | ICD-10-CM

## 2020-03-17 LAB
ANION GAP SERPL CALC-SCNC: 9 MMOL/L (ref 3–18)
BASOPHILS # BLD: 0 K/UL (ref 0–0.1)
BASOPHILS NFR BLD: 0 % (ref 0–2)
BUN SERPL-MCNC: 14 MG/DL (ref 7–18)
BUN/CREAT SERPL: 19 (ref 12–20)
CALCIUM SERPL-MCNC: 9.9 MG/DL (ref 8.5–10.1)
CHLORIDE SERPL-SCNC: 108 MMOL/L (ref 100–111)
CO2 SERPL-SCNC: 25 MMOL/L (ref 21–32)
CREAT SERPL-MCNC: 0.74 MG/DL (ref 0.6–1.3)
DIFFERENTIAL METHOD BLD: ABNORMAL
EOSINOPHIL # BLD: 0.8 K/UL (ref 0–0.4)
EOSINOPHIL NFR BLD: 5 % (ref 0–5)
ERYTHROCYTE [DISTWIDTH] IN BLOOD BY AUTOMATED COUNT: 13.5 % (ref 11.6–14.5)
GLUCOSE SERPL-MCNC: 102 MG/DL (ref 74–99)
HCT VFR BLD AUTO: 48.7 % (ref 35–45)
HGB BLD-MCNC: 17.1 G/DL (ref 12–16)
LYMPHOCYTES # BLD: 4.8 K/UL (ref 0.9–3.6)
LYMPHOCYTES NFR BLD: 32 % (ref 21–52)
MCH RBC QN AUTO: 31.3 PG (ref 24–34)
MCHC RBC AUTO-ENTMCNC: 35.1 G/DL (ref 31–37)
MCV RBC AUTO: 89.2 FL (ref 74–97)
MONOCYTES # BLD: 1.1 K/UL (ref 0.05–1.2)
MONOCYTES NFR BLD: 7 % (ref 3–10)
NEUTS SEG # BLD: 8.3 K/UL (ref 1.8–8)
NEUTS SEG NFR BLD: 56 % (ref 40–73)
PLATELET # BLD AUTO: 298 K/UL (ref 135–420)
PMV BLD AUTO: 9.8 FL (ref 9.2–11.8)
POTASSIUM SERPL-SCNC: 3.9 MMOL/L (ref 3.5–5.5)
RBC # BLD AUTO: 5.46 M/UL (ref 4.2–5.3)
SODIUM SERPL-SCNC: 142 MMOL/L (ref 136–145)
WBC # BLD AUTO: 15 K/UL (ref 4.6–13.2)

## 2020-03-17 PROCEDURE — 74011000250 HC RX REV CODE- 250

## 2020-03-17 PROCEDURE — 74011250636 HC RX REV CODE- 250/636: Performed by: PHYSICIAN ASSISTANT

## 2020-03-17 PROCEDURE — 85025 COMPLETE CBC W/AUTO DIFF WBC: CPT

## 2020-03-17 PROCEDURE — 94640 AIRWAY INHALATION TREATMENT: CPT

## 2020-03-17 PROCEDURE — 96365 THER/PROPH/DIAG IV INF INIT: CPT

## 2020-03-17 PROCEDURE — 99284 EMERGENCY DEPT VISIT MOD MDM: CPT

## 2020-03-17 PROCEDURE — 74011000250 HC RX REV CODE- 250: Performed by: PHYSICIAN ASSISTANT

## 2020-03-17 PROCEDURE — 80048 BASIC METABOLIC PNL TOTAL CA: CPT

## 2020-03-17 PROCEDURE — 96375 TX/PRO/DX INJ NEW DRUG ADDON: CPT

## 2020-03-17 RX ORDER — ALBUTEROL SULFATE 0.83 MG/ML
5 SOLUTION RESPIRATORY (INHALATION)
Status: COMPLETED | OUTPATIENT
Start: 2020-03-17 | End: 2020-03-17

## 2020-03-17 RX ORDER — IPRATROPIUM BROMIDE AND ALBUTEROL SULFATE 2.5; .5 MG/3ML; MG/3ML
SOLUTION RESPIRATORY (INHALATION)
Status: COMPLETED
Start: 2020-03-17 | End: 2020-03-17

## 2020-03-17 RX ORDER — IPRATROPIUM BROMIDE AND ALBUTEROL SULFATE 2.5; .5 MG/3ML; MG/3ML
3 SOLUTION RESPIRATORY (INHALATION)
Status: COMPLETED | OUTPATIENT
Start: 2020-03-17 | End: 2020-03-17

## 2020-03-17 RX ORDER — MAGNESIUM SULFATE HEPTAHYDRATE 40 MG/ML
2 INJECTION, SOLUTION INTRAVENOUS ONCE
Status: COMPLETED | OUTPATIENT
Start: 2020-03-17 | End: 2020-03-17

## 2020-03-17 RX ADMIN — ALBUTEROL SULFATE 5 MG: 2.5 SOLUTION RESPIRATORY (INHALATION) at 23:22

## 2020-03-17 RX ADMIN — MAGNESIUM SULFATE HEPTAHYDRATE 2 G: 40 INJECTION, SOLUTION INTRAVENOUS at 23:22

## 2020-03-17 RX ADMIN — METHYLPREDNISOLONE SODIUM SUCCINATE 125 MG: 125 INJECTION, POWDER, FOR SOLUTION INTRAMUSCULAR; INTRAVENOUS at 22:16

## 2020-03-17 RX ADMIN — IPRATROPIUM BROMIDE AND ALBUTEROL SULFATE 3 ML: 2.5; .5 SOLUTION RESPIRATORY (INHALATION) at 21:55

## 2020-03-17 RX ADMIN — ALBUTEROL SULFATE 5 MG: 2.5 SOLUTION RESPIRATORY (INHALATION) at 22:16

## 2020-03-17 RX ADMIN — IPRATROPIUM BROMIDE AND ALBUTEROL SULFATE 3 ML: .5; 3 SOLUTION RESPIRATORY (INHALATION) at 21:55

## 2020-03-18 ENCOUNTER — PATIENT OUTREACH (OUTPATIENT)
Dept: CASE MANAGEMENT | Age: 30
End: 2020-03-18

## 2020-03-18 VITALS
RESPIRATION RATE: 18 BRPM | HEIGHT: 65 IN | SYSTOLIC BLOOD PRESSURE: 108 MMHG | TEMPERATURE: 98.7 F | OXYGEN SATURATION: 92 % | WEIGHT: 185 LBS | DIASTOLIC BLOOD PRESSURE: 70 MMHG | BODY MASS INDEX: 30.82 KG/M2 | HEART RATE: 88 BPM

## 2020-03-18 RX ORDER — PREDNISONE 10 MG/1
TABLET ORAL
Qty: 21 TAB | Refills: 0 | Status: SHIPPED | OUTPATIENT
Start: 2020-03-18 | End: 2020-04-16

## 2020-03-18 RX ORDER — ALBUTEROL SULFATE 90 UG/1
2 AEROSOL, METERED RESPIRATORY (INHALATION)
Qty: 1 INHALER | Refills: 0 | Status: SHIPPED | OUTPATIENT
Start: 2020-03-18 | End: 2020-04-17

## 2020-03-18 NOTE — PROGRESS NOTES
Attempted to contact patient for Ambulatory Care Management and follow up in response to current Covid-19 pandemic. No answer, mailbox was full. Will attempt to contact at a later time.

## 2020-03-18 NOTE — DISCHARGE INSTRUCTIONS
Patient Education        Learning About Asthma Triggers  What are asthma triggers? When you have asthma, certain things can make your symptoms worse. These are called triggers. Learn what triggers an asthma attack for you, and avoid the triggers when you can. Common triggers include colds, smoke, air pollution, dust, pollen, pets, stress, and cold air. How do asthma triggers affect you? Triggers can make it harder for your lungs to work as they should. They can lead to sudden breathing problems and other symptoms. When you are around a trigger, an asthma attack is more likely. If your symptoms are severe, you may need emergency treatment or have to go to the hospital for treatment. What can you do to avoid triggers? The first thing is to know your triggers. When you are having symptoms, note the things around you that might be causing them. Then look for patterns that may be triggering your symptoms. Record your triggers on a piece of paper or in an asthma diary. When you have your list of possible triggers, work with your doctor to find ways to avoid them. Avoid colds and flu. Get a pneumococcal vaccine shot. If you have had one before, ask your doctor whether you need a second dose. Get a flu vaccine every year, as soon as it's available. If you must be around people with colds or the flu, wash your hands often. Here are some ways to avoid a few common triggers. · Do not smoke or allow others to smoke around you. If you need help quitting, talk to your doctor about stop-smoking programs and medicines. These can increase your chances of quitting for good. · If there is a lot of pollution, pollen, or dust outside, stay at home and keep your windows closed. Use an air conditioner or air filter in your home. Check your local weather report or newspaper for air quality and pollen reports. What else should you know? · Take your controller medicine every day, not just when you have symptoms.  It helps prevent problems before they occur. · Your doctor may suggest that you check how well your lungs are working by measuring your peak expiratory flow (PEF) throughout the day. Your PEF may drop when you are near things that trigger symptoms. Where can you learn more? Go to http://emilia-teresa.info/  Enter S900 in the search box to learn more about \"Learning About Asthma Triggers. \"  Current as of: June 9, 2019Content Version: 12.4  © 7406-1617 [employer]. Care instructions adapted under license by [employer] (which disclaims liability or warranty for this information). If you have questions about a medical condition or this instruction, always ask your healthcare professional. [address] any warranty or liability for your use of this information.

## 2020-03-18 NOTE — ED PROVIDER NOTES
EMERGENCY DEPARTMENT HISTORY AND PHYSICAL EXAM    Date: 3/17/2020  Patient Name: Priscilla Castillo    History of Presenting Illness     Chief Complaint   Patient presents with    Wheezing         History Provided By: Patient    10:03 PM  Priscilla Castillo is a 26 y.o. female with PMHX of severe asthma who presents to the emergency department C/O cough, wheezing shortness of breath onset earlier today. Patient states she just came back from her parents house and [facility], approximately a 4 hour drive. She developed increasing cough and wheezing. Patient is on Combivent and Symbicort for her asthma. Did 2 nebulizer treatments before coming to ED. Patient was admitted to this facility last month for severe asthma exacerbation, states she is not nearly that bad. Patient smokes marijuana and cigarettes. Pt denies fever, chest pain, body aches, nasal congestion, sore throat, other travel, known exposure to COVID-19, and any other sxs or complaints. PCP: Belgica Garcia MD    Current Outpatient Medications   Medication Sig Dispense Refill    albuterol (PROVENTIL HFA, VENTOLIN HFA, PROAIR HFA) 90 mcg/actuation inhaler Take 2 Puffs by inhalation every four (4) hours as needed for Wheezing. 1 Inhaler 0    predniSONE (STERAPRED DS) 10 mg dose pack See administration instruction per 10mg dose pack 21 Tab 0    budesonide-formoteroL (SYMBICORT) 160-4.5 mcg/actuation HFAA Take 2 Puffs by inhalation two (2) times a day. 1 Inhaler 0    ipratropium-albuteroL (COMBIVENT RESPIMAT)  mcg/actuation inhaler Take 1 Puff by inhalation every six (6) hours. 1 Inhaler 0    albuterol-ipratropium (DUO-NEB) 2.5 mg-0.5 mg/3 ml nebu 3 mL by Nebulization route every four (4) hours as needed (sob).  30 Nebule 1       Past History     Past Medical History:  Past Medical History:   Diagnosis Date    Asthma     Ill-defined condition     ovarian cyst    Neurological disorder     migraines       Past Surgical History:  Past Surgical History:   Procedure Laterality Date    HX GYN       x 2, btl       Family History:  No family history on file. Social History:  Social History     Tobacco Use    Smoking status: Current Every Day Smoker     Packs/day: 0.25    Smokeless tobacco: Never Used   Substance Use Topics    Alcohol use: Yes     Comment: rarely    Drug use: No       Allergies: Allergies   Allergen Reactions    Latex Rash    Ativan [Lorazepam] Other (comments)     Aggitation    Phenergan [Promethazine] Rash    Zoloft [Sertraline] Rash         Review of Systems   Review of Systems   Constitutional: Negative for fever. HENT: Negative for congestion and sore throat. Respiratory: Positive for cough, shortness of breath and wheezing. Cardiovascular: Negative for chest pain. Musculoskeletal: Negative for myalgias. All other systems reviewed and are negative. Physical Exam     Vitals:    20 2216 20 2321 20 2322 20 0009   BP: 114/68 108/70 108/70    Pulse: (!) 105  96 88   Resp:    18   Temp:       SpO2:  90%  92%   Weight:       Height:         Physical Exam    Vital signs and nursing notes reviewed. CONSTITUTIONAL: Alert. Well-appearing; well-nourished; mild respiratory distress. HEAD: Normocephalic; atraumatic. EYES: PERRL; Conjunctiva clear. ENT:  Normal nose; no rhinorrhea. Normal pharynx. Moist mucus membranes. NECK: Supple; FROM without difficulty, non-tender; no cervical lymphadenopathy. CV: Tachycardic, regular S1, S2; no murmurs, rubs, or gallops. No chest wall tenderness. RESPIRATORY: Mildly tachypneic, decreased breath sounds with expiratory wheezes bilaterally. SKIN: Normal for age and race; warm; dry; good turgor; no apparent lesions or exudate. NEURO: A & O x3. PSYCH:  Mood and affect appropriate.          Diagnostic Study Results     Labs -     Recent Results (from the past 12 hour(s))   CBC WITH AUTOMATED DIFF    Collection Time: 20 9:55 PM   Result Value Ref Range    WBC 15.0 (H) 4.6 - 13.2 K/uL    RBC 5.46 (H) 4.20 - 5.30 M/uL    HGB 17.1 (H) 12.0 - 16.0 g/dL    HCT 48.7 (H) 35.0 - 45.0 %    MCV 89.2 74.0 - 97.0 FL    MCH 31.3 24.0 - 34.0 PG    MCHC 35.1 31.0 - 37.0 g/dL    RDW 13.5 11.6 - 14.5 %    PLATELET 326 003 - 986 K/uL    MPV 9.8 9.2 - 11.8 FL    NEUTROPHILS 56 40 - 73 %    LYMPHOCYTES 32 21 - 52 %    MONOCYTES 7 3 - 10 %    EOSINOPHILS 5 0 - 5 %    BASOPHILS 0 0 - 2 %    ABS. NEUTROPHILS 8.3 (H) 1.8 - 8.0 K/UL    ABS. LYMPHOCYTES 4.8 (H) 0.9 - 3.6 K/UL    ABS. MONOCYTES 1.1 0.05 - 1.2 K/UL    ABS. EOSINOPHILS 0.8 (H) 0.0 - 0.4 K/UL    ABS. BASOPHILS 0.0 0.0 - 0.1 K/UL    DF AUTOMATED     METABOLIC PANEL, BASIC    Collection Time: 03/17/20  9:55 PM   Result Value Ref Range    Sodium 142 136 - 145 mmol/L    Potassium 3.9 3.5 - 5.5 mmol/L    Chloride 108 100 - 111 mmol/L    CO2 25 21 - 32 mmol/L    Anion gap 9 3.0 - 18 mmol/L    Glucose 102 (H) 74 - 99 mg/dL    BUN 14 7.0 - 18 MG/DL    Creatinine 0.74 0.6 - 1.3 MG/DL    BUN/Creatinine ratio 19 12 - 20      GFR est AA >60 >60 ml/min/1.73m2    GFR est non-AA >60 >60 ml/min/1.73m2    Calcium 9.9 8.5 - 10.1 MG/DL       Radiologic Studies -   No orders to display     CT Results  (Last 48 hours)    None        CXR Results  (Last 48 hours)    None          Medications given in the ED-  Medications   methylPREDNISolone (PF) (Solu-MEDROL) injection 125 mg (125 mg IntraVENous Given 3/17/20 4826)   albuterol-ipratropium (DUO-NEB) 2.5 MG-0.5 MG/3 ML (3 mL Nebulization Given 3/17/20 2155)   albuterol (PROVENTIL VENTOLIN) nebulizer solution 5 mg (5 mg Nebulization Given 3/17/20 2216)   magnesium sulfate 2 g/50 ml IVPB (premix or compounded) (0 g IntraVENous IV Completed 3/17/20 2352)   albuterol (PROVENTIL VENTOLIN) nebulizer solution 5 mg (5 mg Nebulization Given 3/17/20 2322)         Medical Decision Making   I am the first provider for this patient.     I reviewed the vital signs, available nursing notes, past medical history, past surgical history, family history and social history. Vital Signs-Reviewed the patient's vital signs. Pulse Oximetry Analysis - 89% on RA; 95% on 4L       Records Reviewed: Nursing Notes and Old Medical Records      Procedures:  Procedures    ED Course:  10:03 PM   Initial assessment performed. The patients presenting problems have been discussed, and they are in agreement with the care plan formulated and outlined with them. I have encouraged them to ask questions as they arise throughout their visit. 10:58 PM progress note  Patient states she feels a lot better. She initially refused magnesium but agrees to it and another breathing treatment now. Will give 2 g of mag and another 5mg albuterol. SPO2 89 to 90% on room air at this time. Her \"normal\" SPO2 is around 92%. 12 AM progress note  Patient states she feels much better and is ready to go home. She is happy and speaking in full sentences. She still has expiratory wheezes bilaterally and SPO2 90 to 91% on room air. Review of recent admission showed that she was only 93% on room air at time of discharge, so is not far from her baseline. I recommended to continue breathing treatments in ED and possibly admission for asthma exacerbation. However patient politely declines; not want to be admitted, feels she can continue to manage this at home. Understands the risks and benefits of refusing further treatments and possibly admission and will return to ED if any worsening of symptoms. She has her Combivent, Symbicort, nebulizer solution but does request an albuterol inhaler. Diagnosis and Disposition       12:10 AM  I have spent 45 minutes of critical care time involved in lab review, consultations with specialist, family decision-making, and documentation. During this entire length of time I was immediately available to the patient. Critical Care:   The reason for providing this level of medical care for this critically ill patient was due a critical illness that impaired one or more vital organ systems such that there was a high probability of imminent or life threatening deterioration in the patients condition. This care involved high complexity decision making to assess, manipulate, and support vital system functions, to treat this degreee vital organ system failure and to prevent further life threatening deterioration of the patients condition. DISCHARGE NOTE:    Patricia Carrasquillo's  results have been reviewed with her. She has been counseled regarding her diagnosis, treatment, and plan. She verbally conveys understanding and agreement of the signs, symptoms, diagnosis, treatment and prognosis and additionally agrees to follow up as discussed. She also agrees with the care-plan and conveys that all of her questions have been answered. I have also provided discharge instructions for her that include: educational information regarding their diagnosis and treatment, and list of reasons why they would want to return to the ED prior to their follow-up appointment, should her condition change. She has been provided with education for proper emergency department utilization. CLINICAL IMPRESSION:    1. Severe persistent asthma with acute exacerbation        PLAN:  1. D/C Home  2. Current Discharge Medication List      CONTINUE these medications which have CHANGED    Details   albuterol (PROVENTIL HFA, VENTOLIN HFA, PROAIR HFA) 90 mcg/actuation inhaler Take 2 Puffs by inhalation every four (4) hours as needed for Wheezing. Qty: 1 Inhaler, Refills: 0           3.    Follow-up Information     Follow up With Specialties Details Why Contact Info    Micah Phan MD Internal Medicine Schedule an appointment as soon as possible for a visit  [phone]      [facility] EMERGENCY DEPT Emergency Medicine  As needed, If symptoms worsen [address] [place] [id number]  [phone]        _______________________________      Please note that this dictation was completed with [employer], the computer voice recognition software. Quite often unanticipated grammatical, syntax, homophones, and other interpretive errors are inadvertently transcribed by the computer software. Please disregard these errors. Please excuse any errors that have escaped final proofreading.

## 2020-03-18 NOTE — ED NOTES
Patient called requesting prednisone prescription to be sent to [facility]. She states she is feeling significantly better today. I did not send prednisone yesterday but meant to, so it was sent to pharmacy today.

## 2020-03-18 NOTE — ED TRIAGE NOTES
Pt presents to ED w/ c/o of asthma attack[place]. She was using inhaler and did 2 neb treatments without help.  Pt placed on 4L O2 to get her back above 90%

## 2020-03-26 ENCOUNTER — PATIENT OUTREACH (OUTPATIENT)
Dept: CASE MANAGEMENT | Age: 30
End: 2020-03-26

## 2020-04-01 ENCOUNTER — PATIENT OUTREACH (OUTPATIENT)
Dept: CASE MANAGEMENT | Age: 30
End: 2020-04-01

## 2020-04-01 NOTE — PROGRESS NOTES
Attempted to contact patient for Ambulatory Care Management and follow up in response to current Covid-19 pandemic. No answer, left message with contact information provided. Unable to contact, will close episode.

## 2020-04-16 ENCOUNTER — HOSPITAL ENCOUNTER (EMERGENCY)
Age: 30
Discharge: HOME | End: 2020-04-17
Attending: EMERGENCY MEDICINE
Payer: COMMERCIAL

## 2020-04-16 VITALS
WEIGHT: 175 LBS | TEMPERATURE: 97.6 F | SYSTOLIC BLOOD PRESSURE: 115 MMHG | HEART RATE: 106 BPM | DIASTOLIC BLOOD PRESSURE: 71 MMHG | BODY MASS INDEX: 29.16 KG/M2 | OXYGEN SATURATION: 90 % | HEIGHT: 65 IN | RESPIRATION RATE: 19 BRPM

## 2020-04-16 DIAGNOSIS — J45.41 MODERATE PERSISTENT ASTHMA WITH EXACERBATION: Primary | ICD-10-CM

## 2020-04-16 PROCEDURE — 99284 EMERGENCY DEPT VISIT MOD MDM: CPT

## 2020-04-16 PROCEDURE — 74011000250 HC RX REV CODE- 250: Performed by: EMERGENCY MEDICINE

## 2020-04-16 PROCEDURE — 94640 AIRWAY INHALATION TREATMENT: CPT

## 2020-04-16 RX ORDER — IPRATROPIUM BROMIDE AND ALBUTEROL SULFATE 2.5; .5 MG/3ML; MG/3ML
3 SOLUTION RESPIRATORY (INHALATION)
Status: COMPLETED | OUTPATIENT
Start: 2020-04-16 | End: 2020-04-16

## 2020-04-16 RX ADMIN — IPRATROPIUM BROMIDE AND ALBUTEROL SULFATE 3 ML: .5; 3 SOLUTION RESPIRATORY (INHALATION) at 22:36

## 2020-04-17 ENCOUNTER — PATIENT OUTREACH (OUTPATIENT)
Dept: CASE MANAGEMENT | Age: 30
End: 2020-04-17

## 2020-04-17 PROCEDURE — 74011636637 HC RX REV CODE- 636/637: Performed by: EMERGENCY MEDICINE

## 2020-04-17 RX ORDER — ALBUTEROL SULFATE 90 UG/1
2 AEROSOL, METERED RESPIRATORY (INHALATION)
Qty: 1 INHALER | Refills: 1 | Status: SHIPPED | OUTPATIENT
Start: 2020-04-17

## 2020-04-17 RX ORDER — PREDNISONE 20 MG/1
60 TABLET ORAL DAILY
Qty: 15 TAB | Refills: 0 | Status: SHIPPED | OUTPATIENT
Start: 2020-04-17 | End: 2020-04-22

## 2020-04-17 RX ORDER — PREDNISONE 20 MG/1
60 TABLET ORAL
Status: COMPLETED | OUTPATIENT
Start: 2020-04-17 | End: 2020-04-17

## 2020-04-17 RX ADMIN — PREDNISONE 60 MG: 20 TABLET ORAL at 00:17

## 2020-04-17 NOTE — ED TRIAGE NOTES
Patient arrives ambulatory to ED with c/c of wheezing and cough, onset today. Patient reports having bad allergies and is asthmatic.

## 2020-04-17 NOTE — DISCHARGE INSTRUCTIONS
Learning About Asthma Triggers  What are triggers? When you have asthma, certain things can make your symptoms worse. These are called triggers. They include:  · Cigarette smoke or air pollution. · Things you are allergic to, such as:  ¨ Pollen, mold, or dust mites. ¨ Pet hair, skin, or saliva. · Illnesses, like colds, flu, or pneumonia. · Exercise. · Dry, cold air. How do triggers affect asthma? Triggers can make it harder for your lungs to work as they should and can lead to sudden difficulty breathing and other symptoms. When you are around a trigger, an asthma attack is more likely. If your symptoms are severe, you may need emergency treatment or have to go to the hospital for treatment. If you know what your triggers are and can avoid them, you may be able to prevent asthma attacks, reduce how often you have them, and make them less severe. What can you do to avoid triggers? The first thing is to know your triggers. When you are having symptoms, note the things around you that might be causing them. Then look for patterns in what may be triggering your symptoms. Record your triggers on a piece of paper or in an asthma diary. When you have your list of possible triggers, work with your doctor to find ways to avoid them. You also can check how well your lungs are working by measuring your peak expiratory flow (PEF) throughout the day. Your PEF may drop when you are near things that trigger symptoms. Here are some ways to avoid a few common triggers. · Do not smoke or allow others to smoke around you. If you need help quitting, talk to your doctor about stop-smoking programs and medicines. These can increase your chances of quitting for good. · If there is a lot of pollution, pollen, or dust outside, stay at home and keep your windows closed. Use an air conditioner or air filter in your home. Check your local weather report or newspaper for air quality and pollen reports.   · Get a flu shot every year. Talk to your doctor about getting a pneumococcal shot. Wash your hands often to prevent infections. · Avoid exercising outdoors in cold weather. If you are outdoors in cold weather, wear a scarf around your face and breathe through your nose. How can you manage an asthma attack? · If you have an asthma action plan, follow the plan. In general:  ¨ Use your quick-relief inhaler as directed by your doctor. If your symptoms do not get better after you use your medicine, have someone take you to the emergency room. Call an ambulance if needed. ¨ If your doctor has given you other inhaled medicines or steroid pills, take them as directed. Where can you learn more? Go to Explore.To Yellow Pages.be  Enter M564 in the search box to learn more about \"Learning About Asthma Triggers. \"   © 0599-7322 Healthwise, Incorporated. Care instructions adapted under license by [employer] (which disclaims liability or warranty for this information). This care instruction is for use with your licensed healthcare professional. If you have questions about a medical condition or this instruction, always ask your healthcare professional. [place] [address] any warranty or liability for your use of this information. Content Version: 85.2.991382; Last Revised: February 23, 2012                 Asthma Action Plan: After Your Visit  Your Care Instructions  An asthma action plan is based on peak flow and asthma symptoms. Sorting symptoms and peak flow into red, yellow, and green \"zones\" can help you know how bad your asthma is and what actions you should take. Work with your doctor to make your plan. An action plan may include:  · The peak flow readings and symptoms for each zone. · What medicines to take in each zone. · When to call a doctor. · A list of emergency contact numbers. · A list of your asthma triggers. Follow-up care is a key part of your treatment and safety.  Be sure to make and go to all appointments, and call your doctor if you are having problems. It's also a good idea to know your test results and keep a list of the medicines you take. How can you care for yourself at home? · Take your daily medicines to help minimize long-term damage and avoid asthma attacks. · Check your peak flow every morning and evening. This is the best way to know how well your lungs are working. · Check your action plan to see what zone you are in.  ¨ If you are in the green zone, keep taking your daily asthma medicines as prescribed. ¨ If you are in the yellow zone, you may be having or will soon have an asthma attack. You may not have any symptoms, but your lungs are not working as well as they should. Take the medicines listed in your action plan. If you stay in the yellow zone, your doctor may need to increase the dose or add a medicine. ¨ If you are in the red zone, follow your action plan. If your symptoms or peak flow don't improve soon, you may need to go to the emergency room or be admitted to the hospital.  · Use an asthma diary. Write down your peak flow readings in the asthma diary. If you have an attack, write down what caused it (if you know), the symptoms, and what medicine you took. · Make sure you know how and when to call your doctor or go to the hospital.  · Take both the asthma action plan and the asthma diary--along with your peak flow meter and medicines--when you see your doctor. Tell your doctor if you are having trouble following your action plan. When should you call for help? Call 911 anytime you think you may need emergency care. For example, call if:  · You have severe trouble breathing. Call your doctor now or seek immediate medical care if:  · Your symptoms do not get better after you have followed your asthma action plan. · You cough up yellow, dark brown, or bloody mucus (sputum).   Watch closely for changes in your health, and be sure to contact your doctor if:  · Your coughing and wheezing get worse. · You need to use quick-relief medicine on more than 2 days a week (unless it is just for exercise). · You need help figuring out what is triggering your asthma attacks. Where can you learn more? Go to ScreenMedix.be  Enter B511 in the search box to learn more about \"Asthma Action Plan: After Your Visit. \"   © 0192-5011 Healthwise, Incorporated. Care instructions adapted under license by [employer] (which disclaims liability or warranty for this information). This care instruction is for use with your licensed healthcare professional. If you have questions about a medical condition or this instruction, always ask your healthcare professional. [place] [address] any warranty or liability for your use of this information. Content Version: 05.5.795658;  Last Revised: March 9, 2012

## 2020-04-17 NOTE — ED PROVIDER NOTES
EMERGENCY DEPARTMENT HISTORY AND PHYSICAL EXAM    Date: 4/16/2020  Patient Name: Lai Pacheco    History of Presenting Illness     Chief Complaint   Patient presents with    Wheezing         History Provided By: Patient    Additional History (Context):     10:25 PM    Lai Pacheco is a 26 y.o. female with pertinent PMHx of asthma (hospitalized previously, but never intubated) and seasonal allergies (Hay fever) presenting ambulatory to the ED c/o wheezing and SOB x 3-4 days. Pt notes associated symptoms of cough, chest tightness, and itching eyes; but denies vomiting or rhinorrhea. Pt denies any sick contacts with similar sxs. Her last trip was ~1 month ago to [address] [facility]. Pt states that her asthma is triggered by environmental exposures, and she has been sitting at home with her windows down over the last few days. Pt's O2 sats at home were 88%. She used her home nebulizer ~45 minutes ago (Symbicort, Combivent Respimat, and duonebs); with no relief. Her normal oxygen saturations are 92-93%. Her last use of steroids was 03/17/2020 at her last visit to the ER. Per chart review, she has asthma complications about once a month. PCP: Coco Martins MD  Pt does not drink EtOH excessively or do illicit drugs. Pt is a smoker of tobacco.    There are no other complaints, changes, or physical findings at this time. Current Facility-Administered Medications   Medication Dose Route Frequency Provider Last Rate Last Dose    predniSONE (DELTASONE) tablet 60 mg  60 mg Oral NOW Shweta Padron MD         Current Outpatient Medications   Medication Sig Dispense Refill    predniSONE (DELTASONE) 20 mg tablet Take 60 mg by mouth daily for 5 days. With Breakfast 15 Tab 0    albuterol (PROVENTIL HFA, VENTOLIN HFA, PROAIR HFA) 90 mcg/actuation inhaler Take 2 Puffs by inhalation every four (4) hours as needed for Wheezing.  1 Inhaler 1    budesonide-formoteroL (SYMBICORT) 160-4.5 mcg/actuation HFAA Take 2 Puffs by inhalation two (2) times a day. 1 Inhaler 0    ipratropium-albuteroL (COMBIVENT RESPIMAT)  mcg/actuation inhaler Take 1 Puff by inhalation every six (6) hours. 1 Inhaler 0    albuterol-ipratropium (DUO-NEB) 2.5 mg-0.5 mg/3 ml nebu 3 mL by Nebulization route every four (4) hours as needed (sob). 30 Nebule 1       Past History     Past Medical History:  Past Medical History:   Diagnosis Date    Asthma     Ill-defined condition     ovarian cyst    Neurological disorder     migraines       Past Surgical History:  Past Surgical History:   Procedure Laterality Date    HX GYN       x 2, btl       Family History:  History reviewed. No pertinent family history. Social History:  Social History     Tobacco Use    Smoking status: Current Every Day Smoker     Packs/day: 0.25    Smokeless tobacco: Never Used   Substance Use Topics    Alcohol use: Yes     Comment: rarely    Drug use: No       Allergies: Allergies   Allergen Reactions    Latex Rash    Ativan [Lorazepam] Other (comments)     Aggitation    Phenergan [Promethazine] Rash    Zoloft [Sertraline] Rash         Review of Systems   Review of Systems   HENT: Negative for rhinorrhea and sore throat. Eyes: Positive for itching (bilat). Respiratory: Positive for cough, chest tightness, shortness of breath and wheezing. Gastrointestinal: Negative for nausea and vomiting. All other systems reviewed and are negative. Physical Exam     Vitals:    20 2230 20 2245 20 2252 20 2300   BP: 124/80 104/49  115/71   Pulse: (!) 116 (!) 106  (!) 106   Resp: 14 12  19   Temp:       SpO2: 93% 95% 96% 90%   Weight:       Height:         Physical Exam  Vitals signs and nursing note reviewed. Constitutional:       General: She is not in acute distress. Appearance: She is well-developed. She is not diaphoretic. HENT:      Head: Normocephalic and atraumatic.       Right Ear: External ear normal.      Left Ear: External ear normal.      Mouth/Throat:      Pharynx: No oropharyngeal exudate. Eyes:      General: No scleral icterus. Conjunctiva/sclera: Conjunctivae normal.      Pupils: Pupils are equal, round, and reactive to light. Comments: No pallor   Neck:      Musculoskeletal: Normal range of motion and neck supple. Thyroid: No thyromegaly. Vascular: No JVD. Trachea: No tracheal deviation. Cardiovascular:      Rate and Rhythm: Regular rhythm. Tachycardia present. Heart sounds: Normal heart sounds. Pulmonary:      Effort: Pulmonary effort is normal. No respiratory distress. Breath sounds: No stridor. Examination of the right-upper field reveals wheezing. Examination of the left-upper field reveals wheezing. Examination of the right-middle field reveals wheezing. Examination of the left-middle field reveals wheezing. Examination of the right-lower field reveals wheezing. Examination of the left-lower field reveals wheezing. Wheezing (expiratory and inspiratory to all lung fields) present. Comments: She does not have a prolonged expiratory phase  Abdominal:      General: Bowel sounds are normal. There is no distension. Palpations: Abdomen is soft. Tenderness: There is no abdominal tenderness. There is no guarding or rebound. Musculoskeletal: Normal range of motion. General: No tenderness. Comments: No soft tissue injuries   Lymphadenopathy:      Cervical: No cervical adenopathy. Skin:     General: Skin is warm and dry. Findings: No erythema or rash. Neurological:      Mental Status: She is alert and oriented to person, place, and time. Cranial Nerves: No cranial nerve deficit. Coordination: Coordination normal.      Deep Tendon Reflexes: Reflexes are normal and symmetric. Reflexes normal.   Psychiatric:         Behavior: Behavior normal.         Thought Content:  Thought content normal.         Judgment: Judgment normal.         Diagnostic Study Results     Labs -   No results found for this or any previous visit (from the past 12 hour(s)). Radiologic Studies -   No orders to display         Medical Decision Making   I am the first provider for this patient. I reviewed the vital signs, available nursing notes, past medical history, past surgical history, family history and social history. Vital Signs-Reviewed the patient's vital signs. Pulse Oximetry Analysis - 91% on RA     Cardiac Monitor:  Rate: 122 bpm  Rhythm: sinus tachycardia    Records Reviewed: Nursing Notes and Old Medical Records    Provider Notes (Medical Decision Making): Sxs of asthma exacerbation. Unlikely she has status. She sounds stable. Will attempt to treat sxs first, if unresponsive to medicines will add blood work and XR with mag to consider other complications including: PTX, PNA, or aspiration. We had a discussion about importance of smoking cessation. PROCEDURES:  Procedures    MEDICATIONS GIVEN IN THE ED:  Medications   predniSONE (DELTASONE) tablet 60 mg (has no administration in time range)   albuterol-ipratropium (DUO-NEB) 2.5 MG-0.5 MG/3 ML (3 mL Nebulization Given 4/16/20 2236)   albuterol-ipratropium (DUO-NEB) 2.5 MG-0.5 MG/3 ML (3 mL Nebulization Given 4/16/20 2236)        ED COURSE:   10:25 PM   Initial assessment performed. PROGRESS NOTE:  12:05 AM  Pt's lungs are more clear and she has better air movement. Wheezing is significantly improved, but not completely resolved. But, she feels comfortable to go home. Will add steroids. Saturations are up to 94%. Written by Phil Rosales, ED Scribe, as dictated by Merrily Soulier Riesa Pian, MD.      CRITICAL CARE NOTE:  11:57 PM  I have spent 35 minutes of critical care time involved in lab review, consultations with specialist, family decision-making, and documentation. During this entire length of time I was immediately available to the patient. Critical Care:   The reason for providing this level of medical care for this critically ill patient was due a critical illness that impaired one or more vital organ systems such that there was a high probability of imminent or life threatening deterioration in the patients condition. This care involved high complexity decision making to assess, manipulate, and support vital system functions, to treat this degreee vital organ system failure and to prevent further life threatening deterioration of the patients condition. Diagnosis and Disposition       DISCHARGE NOTE:  12:10 AM  The patient is ready for discharge. The patient's signs, symptoms, diagnosis, and discharge instructions have been discussed and the patient and/or family has conveyed their understanding. The patient and/or family is to follow up as recommended or return to the ER should their symptoms worsen. Plan has been discussed and the patient and/or family is in agreement. Written by VINCENT Cunha, as dictated by Rose Roberson MD.     CLINICAL IMPRESSION:  1. Moderate persistent asthma with exacerbation        PLAN:  1. D/C Home  2. Current Discharge Medication List      START taking these medications    Details   predniSONE (DELTASONE) 20 mg tablet Take 60 mg by mouth daily for 5 days. With Breakfast  Qty: 15 Tab, Refills: 0         CONTINUE these medications which have CHANGED    Details   albuterol (PROVENTIL HFA, VENTOLIN HFA, PROAIR HFA) 90 mcg/actuation inhaler Take 2 Puffs by inhalation every four (4) hours as needed for Wheezing. Qty: 1 Inhaler, Refills: 1           3.    Follow-up Information     Follow up With Specialties Details Why Contact Info    Tom Short MD Internal Medicine Schedule an appointment as soon as possible for a visit As needed [phone] [facility] EMERGENCY DEPT Emergency Medicine  As needed, If symptoms worsen [address] [id number]  [phone] _______________________________    Attestations: This note is prepared by Sydney Ruiz, acting as Scribe for Shawn. Eric Elizabeth MD.    Shawn. Eric Elizabeth MD:  The scribe's documentation has been prepared under my direction and personally reviewed by me in its entirety.  I confirm that the note above accurately reflects all work, treatment, procedures, and medical decision making performed by me.  _______________________________

## 2020-04-17 NOTE — PROGRESS NOTES
Attempted to contact patient for Ambulatory Care Management and follow up in response to current Covid-19 pandemic. No answer, unable to leave a message as voicemail box was full. Will attempt to contact at a later time.

## 2020-04-20 ENCOUNTER — PATIENT OUTREACH (OUTPATIENT)
Dept: CASE MANAGEMENT | Age: 30
End: 2020-04-20

## 2020-04-21 ENCOUNTER — PATIENT OUTREACH (OUTPATIENT)
Dept: CASE MANAGEMENT | Age: 30
End: 2020-04-21

## 2021-02-15 ENCOUNTER — APPOINTMENT (OUTPATIENT)
Dept: GENERAL RADIOLOGY | Age: 31
End: 2021-02-15
Attending: EMERGENCY MEDICINE
Payer: COMMERCIAL

## 2021-02-15 ENCOUNTER — HOSPITAL ENCOUNTER (EMERGENCY)
Age: 31
Discharge: HOME | End: 2021-02-15
Attending: EMERGENCY MEDICINE
Payer: COMMERCIAL

## 2021-02-15 VITALS
TEMPERATURE: 98.2 F | OXYGEN SATURATION: 94 % | HEIGHT: 65 IN | RESPIRATION RATE: 21 BRPM | HEART RATE: 129 BPM | BODY MASS INDEX: 33.32 KG/M2 | WEIGHT: 200 LBS | SYSTOLIC BLOOD PRESSURE: 134 MMHG | DIASTOLIC BLOOD PRESSURE: 73 MMHG

## 2021-02-15 DIAGNOSIS — J45.51 SEVERE PERSISTENT ASTHMA WITH ACUTE EXACERBATION: ICD-10-CM

## 2021-02-15 DIAGNOSIS — Z20.822 SUSPECTED COVID-19 VIRUS INFECTION: Primary | ICD-10-CM

## 2021-02-15 LAB
ALBUMIN SERPL-MCNC: 3.9 G/DL (ref 3.4–5)
ALBUMIN/GLOB SERPL: 1.2 {RATIO} (ref 0.8–1.7)
ALP SERPL-CCNC: 58 U/L (ref 45–117)
ALT SERPL-CCNC: 17 U/L (ref 13–56)
ANION GAP SERPL CALC-SCNC: 9 MMOL/L (ref 3–18)
APTT PPP: 30.7 SEC (ref 23–36.4)
ARTERIAL PATENCY WRIST A: YES
AST SERPL-CCNC: 6 U/L (ref 10–38)
BASE DEFICIT BLD-SCNC: 3 MMOL/L
BASOPHILS # BLD: 0 K/UL (ref 0–0.1)
BASOPHILS NFR BLD: 0 % (ref 0–2)
BDY SITE: ABNORMAL
BILIRUB SERPL-MCNC: 0.4 MG/DL (ref 0.2–1)
BNP SERPL-MCNC: 30 PG/ML (ref 0–450)
BODY TEMPERATURE: 98.6
BUN SERPL-MCNC: 13 MG/DL (ref 7–18)
BUN/CREAT SERPL: 17 (ref 12–20)
CALCIUM SERPL-MCNC: 9.6 MG/DL (ref 8.5–10.1)
CHLORIDE SERPL-SCNC: 109 MMOL/L (ref 100–111)
CK MB CFR SERPL CALC: NORMAL % (ref 0–4)
CK MB SERPL-MCNC: <1 NG/ML (ref 5–25)
CK SERPL-CCNC: 45 U/L (ref 26–192)
CO2 SERPL-SCNC: 23 MMOL/L (ref 21–32)
CREAT SERPL-MCNC: 0.78 MG/DL (ref 0.6–1.3)
D DIMER PPP FEU-MCNC: 0.32 UG/ML(FEU)
DIFFERENTIAL METHOD BLD: ABNORMAL
EOSINOPHIL # BLD: 0 K/UL (ref 0–0.4)
EOSINOPHIL NFR BLD: 0 % (ref 0–5)
ERYTHROCYTE [DISTWIDTH] IN BLOOD BY AUTOMATED COUNT: 12.8 % (ref 11.6–14.5)
GAS FLOW.O2 O2 DELIVERY SYS: ABNORMAL L/MIN
GLOBULIN SER CALC-MCNC: 3.3 G/DL (ref 2–4)
GLUCOSE SERPL-MCNC: 98 MG/DL (ref 74–99)
HCO3 BLD-SCNC: 21.1 MMOL/L (ref 22–26)
HCT VFR BLD AUTO: 47.9 % (ref 35–45)
HGB BLD-MCNC: 17.4 G/DL (ref 12–16)
INR PPP: 1 (ref 0.8–1.2)
LACTATE SERPL-SCNC: 1.4 MMOL/L (ref 0.4–2)
LYMPHOCYTES # BLD: 1.5 K/UL (ref 0.9–3.6)
LYMPHOCYTES NFR BLD: 8 % (ref 21–52)
MCH RBC QN AUTO: 33.5 PG (ref 24–34)
MCHC RBC AUTO-ENTMCNC: 36.3 G/DL (ref 31–37)
MCV RBC AUTO: 92.1 FL (ref 74–97)
MONOCYTES # BLD: 1.2 K/UL (ref 0.05–1.2)
MONOCYTES NFR BLD: 7 % (ref 3–10)
NEUTS SEG # BLD: 15.5 K/UL (ref 1.8–8)
NEUTS SEG NFR BLD: 85 % (ref 40–73)
O2/TOTAL GAS SETTING VFR VENT: 0.21 %
PCO2 BLD: 29.8 MMHG (ref 35–45)
PH BLD: 7.46 [PH] (ref 7.35–7.45)
PLATELET # BLD AUTO: 269 K/UL (ref 135–420)
PMV BLD AUTO: 9.7 FL (ref 9.2–11.8)
PO2 BLD: 48 MMHG (ref 80–100)
POTASSIUM SERPL-SCNC: 4 MMOL/L (ref 3.5–5.5)
PROT SERPL-MCNC: 7.2 G/DL (ref 6.4–8.2)
PROTHROMBIN TIME: 12.8 SEC (ref 11.5–15.2)
RBC # BLD AUTO: 5.2 M/UL (ref 4.2–5.3)
SAO2 % BLD: 86 % (ref 92–97)
SERVICE CMNT-IMP: ABNORMAL
SODIUM SERPL-SCNC: 141 MMOL/L (ref 136–145)
SPECIMEN TYPE: ABNORMAL
TOTAL RESP. RATE, ITRR: 19
TROPONIN I SERPL-MCNC: <0.02 NG/ML (ref 0–0.04)
WBC # BLD AUTO: 18.2 K/UL (ref 4.6–13.2)

## 2021-02-15 PROCEDURE — 36600 WITHDRAWAL OF ARTERIAL BLOOD: CPT

## 2021-02-15 PROCEDURE — 83880 ASSAY OF NATRIURETIC PEPTIDE: CPT

## 2021-02-15 PROCEDURE — 71045 X-RAY EXAM CHEST 1 VIEW: CPT

## 2021-02-15 PROCEDURE — 83605 ASSAY OF LACTIC ACID: CPT

## 2021-02-15 PROCEDURE — 85610 PROTHROMBIN TIME: CPT

## 2021-02-15 PROCEDURE — 99285 EMERGENCY DEPT VISIT HI MDM: CPT

## 2021-02-15 PROCEDURE — 93005 ELECTROCARDIOGRAM TRACING: CPT

## 2021-02-15 PROCEDURE — 96375 TX/PRO/DX INJ NEW DRUG ADDON: CPT

## 2021-02-15 PROCEDURE — 74011250636 HC RX REV CODE- 250/636: Performed by: EMERGENCY MEDICINE

## 2021-02-15 PROCEDURE — 85730 THROMBOPLASTIN TIME PARTIAL: CPT

## 2021-02-15 PROCEDURE — 82803 BLOOD GASES ANY COMBINATION: CPT

## 2021-02-15 PROCEDURE — 87040 BLOOD CULTURE FOR BACTERIA: CPT

## 2021-02-15 PROCEDURE — 82550 ASSAY OF CK (CPK): CPT

## 2021-02-15 PROCEDURE — 96374 THER/PROPH/DIAG INJ IV PUSH: CPT

## 2021-02-15 PROCEDURE — 74011000250 HC RX REV CODE- 250: Performed by: EMERGENCY MEDICINE

## 2021-02-15 PROCEDURE — 85379 FIBRIN DEGRADATION QUANT: CPT

## 2021-02-15 PROCEDURE — 85025 COMPLETE CBC W/AUTO DIFF WBC: CPT

## 2021-02-15 PROCEDURE — 94640 AIRWAY INHALATION TREATMENT: CPT

## 2021-02-15 PROCEDURE — 80053 COMPREHEN METABOLIC PANEL: CPT

## 2021-02-15 RX ORDER — IPRATROPIUM BROMIDE AND ALBUTEROL SULFATE 2.5; .5 MG/3ML; MG/3ML
3 SOLUTION RESPIRATORY (INHALATION)
Status: COMPLETED | OUTPATIENT
Start: 2021-02-15 | End: 2021-02-15

## 2021-02-15 RX ORDER — ALBUTEROL SULFATE 0.83 MG/ML
5 SOLUTION RESPIRATORY (INHALATION)
Status: COMPLETED | OUTPATIENT
Start: 2021-02-15 | End: 2021-02-15

## 2021-02-15 RX ORDER — METHYLPREDNISOLONE 4 MG/1
TABLET ORAL
Qty: 1 DOSE PACK | Refills: 0 | OUTPATIENT
Start: 2021-02-15 | End: 2021-11-03

## 2021-02-15 RX ORDER — DEXAMETHASONE SODIUM PHOSPHATE 10 MG/ML
10 INJECTION INTRAMUSCULAR; INTRAVENOUS
Status: COMPLETED | OUTPATIENT
Start: 2021-02-15 | End: 2021-02-15

## 2021-02-15 RX ORDER — SODIUM CHLORIDE 0.9 % (FLUSH) 0.9 %
5-10 SYRINGE (ML) INJECTION AS NEEDED
Status: DISCONTINUED | OUTPATIENT
Start: 2021-02-15 | End: 2021-02-16 | Stop reason: HOSPADM

## 2021-02-15 RX ADMIN — IPRATROPIUM BROMIDE AND ALBUTEROL SULFATE 3 ML: .5; 3 SOLUTION RESPIRATORY (INHALATION) at 21:28

## 2021-02-15 RX ADMIN — ALBUTEROL SULFATE 5 MG: 2.5 SOLUTION RESPIRATORY (INHALATION) at 21:28

## 2021-02-15 RX ADMIN — DEXAMETHASONE SODIUM PHOSPHATE 10 MG: 10 INJECTION, SOLUTION INTRAMUSCULAR; INTRAVENOUS at 21:25

## 2021-02-15 RX ADMIN — AZITHROMYCIN MONOHYDRATE 500 MG: 500 INJECTION, POWDER, LYOPHILIZED, FOR SOLUTION INTRAVENOUS at 21:57

## 2021-02-15 RX ADMIN — CEFTRIAXONE 2 G: 2 INJECTION, POWDER, FOR SOLUTION INTRAMUSCULAR; INTRAVENOUS at 21:57

## 2021-02-16 LAB
ATRIAL RATE: 124 BPM
CALCULATED P AXIS, ECG09: 77 DEGREES
CALCULATED R AXIS, ECG10: 79 DEGREES
CALCULATED T AXIS, ECG11: 37 DEGREES
DIAGNOSIS, 93000: NORMAL
P-R INTERVAL, ECG05: 124 MS
Q-T INTERVAL, ECG07: 298 MS
QRS DURATION, ECG06: 82 MS
QTC CALCULATION (BEZET), ECG08: 428 MS
VENTRICULAR RATE, ECG03: 124 BPM

## 2021-02-16 NOTE — ED PROVIDER NOTES
EMERGENCY DEPARTMENT HISTORY AND PHYSICAL EXAM    Date: 2/15/2021  Patient Name: Evelia Whitfield    History of Presenting Illness     Chief Complaint   Patient presents with    Wheezing         History Provided By: Patient    Additional History (Context):   Evelia Whitfield is a 31 y.o. female with PMHX asthma presents to the emergency department via private vehicle C/O wheezing and shortness of breath since Thursday. Patient reports that the shortness of breath worsened today. [facility]. States that she refused to get Covid testing at [facility].  Also refusing Covid testing today. States that she had quit smoking last week. Pt denies fever, nausea, vomiting, chills, and any other sxs or complaints. No relieving or exacerbating factors identified. Reports congestion and dry cough. PCP: Padmini Ring MD    Current Facility-Administered Medications   Medication Dose Route Frequency Provider Last Rate Last Admin    sodium chloride (NS) flush 5-10 mL  5-10 mL IntraVENous PRN Agjacquelineiasola-Merly Nguyena, DO        cefTRIAXone (ROCEPHIN) 2 g in sterile water (preservative free) 20 mL IV syringe  2 g IntraVENous Q24H Agustiady-Merly Nguyen Butler, DO   2 g at 02/15/21 2157    azithromycin (ZITHROMAX) 500 mg in 0.9% sodium chloride 250 mL (VIAL-MATE)  500 mg IntraVENous Q24H Agustiady-PatrickWilfred Inoue, DO   500 mg at 02/15/21 2157     Current Outpatient Medications   Medication Sig Dispense Refill    methylPREDNISolone (Medrol, Mateo,) 4 mg tablet Taper as indicated on packaging 1 Dose Pack 0    albuterol (PROVENTIL HFA, VENTOLIN HFA, PROAIR HFA) 90 mcg/actuation inhaler Take 2 Puffs by inhalation every four (4) hours as needed for Wheezing. 1 Inhaler 1    budesonide-formoteroL (SYMBICORT) 160-4.5 mcg/actuation HFAA Take 2 Puffs by inhalation two (2) times a day.  1 Inhaler 0    ipratropium-albuteroL (COMBIVENT RESPIMAT)  mcg/actuation inhaler Take 1 Puff by inhalation every six (6) hours. 1 Inhaler 0    albuterol-ipratropium (DUO-NEB) 2.5 mg-0.5 mg/3 ml nebu 3 mL by Nebulization route every four (4) hours as needed (sob). 30 Nebule 1       Past History     Past Medical History:  Past Medical History:   Diagnosis Date    Asthma     Ill-defined condition     ovarian cyst    Neurological disorder     migraines       Past Surgical History:  Past Surgical History:   Procedure Laterality Date    HX GYN       x 2, btl       Family History:  History reviewed. No pertinent family history. Social History:  Social History     Tobacco Use    Smoking status: Current Every Day Smoker     Packs/day: 0.25    Smokeless tobacco: Never Used   Substance Use Topics    Alcohol use: Yes     Comment: rarely    Drug use: No       Allergies: Allergies   Allergen Reactions    Latex Rash    Ativan [Lorazepam] Other (comments)     Aggitation    Phenergan [Promethazine] Rash    Zoloft [Sertraline] Rash         Review of Systems   Review of Systems   Constitutional: Negative for chills and fever. HENT: Positive for congestion and rhinorrhea. Negative for ear pain, sinus pain and sore throat. Eyes: Negative for pain and visual disturbance. Respiratory: Positive for cough, shortness of breath and wheezing. Cardiovascular: Negative for chest pain and leg swelling. Gastrointestinal: Negative for abdominal pain, constipation, diarrhea, nausea and vomiting. Genitourinary: Negative for dysuria, hematuria, vaginal bleeding and vaginal discharge. Musculoskeletal: Negative for back pain and neck pain. Skin: Negative for rash and wound. Neurological: Negative for dizziness, tremors, weakness, light-headedness and numbness. All other systems reviewed and are negative.       Physical Exam     Vitals:    02/15/21 2052 02/15/21 2055 02/15/21 2200 02/15/21 2234   BP: 126/82  126/61 134/73   Pulse: (!) 136  (!) 133 (!) 129   Resp: 24   21   Temp: 98.2 °F (36.8 °C)      SpO2: 90% 93% (!) 89% 94%   Weight: 90.7 kg (200 lb)      Height: 5' 5\" (1.651 m)        Physical Exam    Nursing note and vitals reviewed    Constitutional: Roderick Witt  female, dyspneic in moderate distress head: Normocephalic, Atraumatic  Eyes: Pupils are equal, round, and reactive to light, EOMI  Neck: Supple, non-tender  Cardiovascular: Regular rate and rhythm, no murmurs, rubs, or gallops, + 2 radial pulses bilaterally  Chest: Dyspneic and tachypneic with chest excursion bilaterally  Lungs: Diminished breath sounds with moderate scattered expiratory wheezes  Abdomen: Soft, non tender, non distended, normoactive bowel sounds  Back: No evidence of trauma or deformity  Extremities: No evidence of trauma or deformity, no LE edema. No streaking erythema, vesicular lesions, ulcerations or bulla  Skin: Warm and dry, normal cap refill  Neuro: Alert and appropriate, CN intact, normal speech, moving all 4 extremities freely and symmetrically  Psychiatric: Normal mood and affect       Diagnostic Study Results     Labs -     Recent Results (from the past 12 hour(s))   CBC WITH AUTOMATED DIFF    Collection Time: 02/15/21  8:47 PM   Result Value Ref Range    WBC 18.2 (H) 4.6 - 13.2 K/uL    RBC 5.20 4. 20 - 5.30 M/uL    HGB 17.4 (H) 12.0 - 16.0 g/dL    HCT 47.9 (H) 35.0 - 45.0 %    MCV 92.1 74.0 - 97.0 FL    MCH 33.5 24.0 - 34.0 PG    MCHC 36.3 31.0 - 37.0 g/dL    RDW 12.8 11.6 - 14.5 %    PLATELET 883 551 - 305 K/uL    MPV 9.7 9.2 - 11.8 FL    NEUTROPHILS 85 (H) 40 - 73 %    LYMPHOCYTES 8 (L) 21 - 52 %    MONOCYTES 7 3 - 10 %    EOSINOPHILS 0 0 - 5 %    BASOPHILS 0 0 - 2 %    ABS. NEUTROPHILS 15.5 (H) 1.8 - 8.0 K/UL    ABS. LYMPHOCYTES 1.5 0.9 - 3.6 K/UL    ABS. MONOCYTES 1.2 0.05 - 1.2 K/UL    ABS. EOSINOPHILS 0.0 0.0 - 0.4 K/UL    ABS.  BASOPHILS 0.0 0.0 - 0.1 K/UL    DF AUTOMATED     PROTHROMBIN TIME + INR    Collection Time: 02/15/21  8:47 PM   Result Value Ref Range    Prothrombin time 12.8 11.5 - 15.2 sec    INR 1.0 0.8 - 1.2     PTT    Collection Time: 02/15/21  8:47 PM   Result Value Ref Range    aPTT 30.7 23.0 - 45.3 SEC   METABOLIC PANEL, COMPREHENSIVE    Collection Time: 02/15/21  8:47 PM   Result Value Ref Range    Sodium 141 136 - 145 mmol/L    Potassium 4.0 3.5 - 5.5 mmol/L    Chloride 109 100 - 111 mmol/L    CO2 23 21 - 32 mmol/L    Anion gap 9 3.0 - 18 mmol/L    Glucose 98 74 - 99 mg/dL    BUN 13 7.0 - 18 MG/DL    Creatinine 0.78 0.6 - 1.3 MG/DL    BUN/Creatinine ratio 17 12 - 20      GFR est AA >60 >60 ml/min/1.73m2    GFR est non-AA >60 >60 ml/min/1.73m2    Calcium 9.6 8.5 - 10.1 MG/DL    Bilirubin, total 0.4 0.2 - 1.0 MG/DL    ALT (SGPT) 17 13 - 56 U/L    AST (SGOT) 6 (L) 10 - 38 U/L    Alk.  phosphatase 58 45 - 117 U/L    Protein, total 7.2 6.4 - 8.2 g/dL    Albumin 3.9 3.4 - 5.0 g/dL    Globulin 3.3 2.0 - 4.0 g/dL    A-G Ratio 1.2 0.8 - 1.7     NT-PRO BNP    Collection Time: 02/15/21  8:47 PM   Result Value Ref Range    NT pro-BNP 30 0 - 450 PG/ML   CARDIAC PANEL,(CK, CKMB & TROPONIN)    Collection Time: 02/15/21  8:47 PM   Result Value Ref Range    CK - MB <1.0 <3.6 ng/ml    CK-MB Index  0.0 - 4.0 %     CALCULATION NOT PERFORMED WHEN RESULT IS BELOW LINEAR LIMIT    CK 45 26 - 192 U/L    Troponin-I, QT <0.02 0.0 - 0.045 NG/ML   D DIMER    Collection Time: 02/15/21  8:55 PM   Result Value Ref Range    D DIMER 0.32 <0.46 ug/ml(FEU)   EKG, 12 LEAD, INITIAL    Collection Time: 02/15/21  9:00 PM   Result Value Ref Range    Ventricular Rate 124 BPM    Atrial Rate 124 BPM    P-R Interval 124 ms    QRS Duration 82 ms    Q-T Interval 298 ms    QTC Calculation (Bezet) 428 ms    Calculated P Axis 77 degrees    Calculated R Axis 79 degrees    Calculated T Axis 37 degrees    Diagnosis       Sinus tachycardia  Possible Left atrial enlargement  Borderline ECG  No previous ECGs available     POC G3    Collection Time: 02/15/21  9:09 PM   Result Value Ref Range    Device: ROOM AIR      FIO2 (POC) 0.21 %    pH (POC) 7.46 (H) 7.35 - 7.45      pCO2 (POC) 29.8 (L) 35.0 - 45.0 MMHG    pO2 (POC) 48 (LL) 80 - 100 MMHG    HCO3 (POC) 21.1 (L) 22 - 26 MMOL/L    sO2 (POC) 86 (L) 92 - 97 %    Base deficit (POC) 3 mmol/L    Allens test (POC) YES      Total resp. rate 19      Site RIGHT RADIAL      Patient temp. 98.6      Specimen type (POC) ARTERIAL      Performed by Vinay Black        Radiologic Studies -   XR CHEST PORT   Final Result   No acute process        CT Results  (Last 48 hours)    None        CXR Results  (Last 48 hours)               02/15/21 2117  XR CHEST PORT Final result    Impression:  No acute process       Narrative:  EXAM:  AP Portable Chest X-ray 1 view        INDICATION: Shortness of breath       COMPARISON: February 21, 2020       _______________       FINDINGS:  Heart and mediastinal contours are within normal limits for portable   radiograph. Lungs are clear of active disease. There are no pleural effusions. No acute osseous findings. ________________                       Medical Decision Making   I am the first provider for this patient. I reviewed the vital signs, available nursing notes, past medical history, past surgical history, family history and social history. Vital Signs-Reviewed the patient's vital signs. Pulse Oximetry Analysis -88% on room air    Cardiac Monitor:  Rate: 136 bpm  Rhythm: Regular    EKG interpretation: (Preliminary)  8:45 PM   Tachycardia 124 bpm.  VA interval 124 ms. QRS 82 ms. QTc 428 ms. No acute ST elevation    Records Reviewed: Nursing Notes and Old Medical Records    Provider Notes:   31 y.o. female who presents with shortness of breath and wheezing. On presentation, patient is in moderate distress. She is tachypneic, dyspneic with diminished breath sounds and moderate expiratory wheezes. With her history of asthma, patient is high risk for Covid infection. However refusing Covid testing. ABG confirming hypoxia.   Patient started on continuous albuterol, steroids. Will start patient on high flow. Obtain chest x-ray to rule out bacterial pneumonia. In light of suspicion for Covid, and high risk for coagulopathy, will check D-dimer. Procedures:  Procedures    ED Course:   8:46 PM   Initial assessment performed. The patients presenting problems have been discussed, and they are in agreement with the care plan formulated and outlined with them. I have encouraged them to ask questions as they arise throughout their visit. SMOKING CESSATION:  The patient was counseled on the dangers of tobacco use, and was advised to quit. Reviewed strategies to maximize success, including removing cigarettes and smoking materials from environment. Discussion took 3-5 minutes, and pt expressed understanding. 10:51 PM  Patient's D-dimer is within normal limits. Patient's chest x-ray showing no acute process. Patient with a leukocytosis of 18,000. On reassessment, patient saturating 94% and states that she took off her high flow and is refusing admission. 10:52 PM         I informed the pt that she needed admission, further observation, further workup for adequate evaluation for her shortness of breath, and that by refusing the above, she is at risk for myocardial infarction, arrhythmia, sudden death, paralysis, further deterioration, coma. She is awake, alert, and she understands her condition and the risks involved in leaving. She is clinically aware of her surroundings and able to ask appropriate questions. She was provided with warnings regarding worsening of her condition and were provided instructions to follow up with Annabel Dai MD tomorrow or return to the Emergency Room as soon as possible. Diagnosis and Disposition     10:52 PM  I have spent 60 minutes of critical care time involved in lab review, consultations with specialist, family decision-making, and documentation.   During this entire length of time I was immediately available to the patient. Critical Care: The reason for providing this level of medical care for this critically ill patient was due a critical illness that impaired one or more vital organ systems such that there was a high probability of imminent or life threatening deterioration in the patients condition. This care involved high complexity decision making to assess, manipulate, and support vital system functions, to treat this degreee vital organ system failure and to prevent further life threatening deterioration of the patients condition. DISCHARGE NOTE:  10:51 PM    Patricia Carrasquillo's  results have been reviewed with her. She has been counseled regarding her diagnosis, treatment, and plan. She verbally conveys understanding and agreement of the signs, symptoms, diagnosis, treatment and prognosis and additionally agrees to follow up as discussed. She also agrees with the care-plan and conveys that all of her questions have been answered. I have also provided discharge instructions for her that include: educational information regarding their diagnosis and treatment, and list of reasons why they would want to return to the ED prior to their follow-up appointment, should her condition change. She has been provided with education for proper emergency department utilization. CLINICAL IMPRESSION:    1. Suspected COVID-19 virus infection    2. Severe persistent asthma with acute exacerbation        PLAN:  1. AMA  2. Current Discharge Medication List      START taking these medications    Details   methylPREDNISolone (Medrol, Mateo,) 4 mg tablet Taper as indicated on packaging  Qty: 1 Dose Pack, Refills: 0           3.    Follow-up Information     Follow up With Specialties Details Why Contact Info    Marco Martínez MD Internal Medicine Schedule an appointment as soon as possible for a visit in 2 days  [facility] [place] [address] [phone] [facility]      [facility] EMERGENCY DEPT Emergency Medicine As needed if symptoms worsen [address] [id number]  [phone]        ____________________________________     Please note that this dictation was completed with [employer], the computer voice recognition software. Quite often unanticipated grammatical, syntax, homophones, and other interpretive errors are inadvertently transcribed by the computer software. Please disregard these errors. Please excuse any errors that have escaped final proofreading.

## 2021-02-16 NOTE — ED TRIAGE NOTES
Pt ambulatory to ED Cc of Asthma attack, worsening SOB since Thursday. Seen at [facility] earlier today. Dx Bronchitis. Started on Prednisone. Productive cough, white sputum. Sneezing, nasal congestion. On admission to ED O2 sat 90 on RA.  (Reports she normally has a sat of 93%RA) Immediately placed on 10L NRB with sat improved to 93%

## 2021-02-21 LAB
BACTERIA SPEC CULT: NORMAL
BACTERIA SPEC CULT: NORMAL
SERVICE CMNT-IMP: NORMAL
SERVICE CMNT-IMP: NORMAL

## 2021-04-26 NOTE — PROGRESS NOTES
Attempted to contact patient for Ambulatory Care Management and follow up in response to current Covid-19 pandemic. No answer, left message with contact information provided. Unable to contact, will close episode. No

## 2021-05-13 ENCOUNTER — HOSPITAL ENCOUNTER (EMERGENCY)
Age: 31
Discharge: HOME | End: 2021-05-14
Attending: EMERGENCY MEDICINE
Payer: COMMERCIAL

## 2021-05-13 DIAGNOSIS — J45.51 SEVERE PERSISTENT ASTHMA WITH ACUTE EXACERBATION: Primary | ICD-10-CM

## 2021-05-13 PROCEDURE — 99283 EMERGENCY DEPT VISIT LOW MDM: CPT

## 2021-05-13 PROCEDURE — 94640 AIRWAY INHALATION TREATMENT: CPT

## 2021-05-13 PROCEDURE — 96372 THER/PROPH/DIAG INJ SC/IM: CPT

## 2021-05-13 RX ORDER — IPRATROPIUM BROMIDE AND ALBUTEROL SULFATE 2.5; .5 MG/3ML; MG/3ML
3 SOLUTION RESPIRATORY (INHALATION)
Status: COMPLETED | OUTPATIENT
Start: 2021-05-14 | End: 2021-05-14

## 2021-05-13 RX ORDER — MAGNESIUM SULFATE HEPTAHYDRATE 40 MG/ML
2 INJECTION, SOLUTION INTRAVENOUS ONCE
Status: DISCONTINUED | OUTPATIENT
Start: 2021-05-14 | End: 2021-05-14 | Stop reason: HOSPADM

## 2021-05-14 VITALS
OXYGEN SATURATION: 90 % | HEIGHT: 65 IN | RESPIRATION RATE: 30 BRPM | SYSTOLIC BLOOD PRESSURE: 150 MMHG | BODY MASS INDEX: 33.32 KG/M2 | DIASTOLIC BLOOD PRESSURE: 98 MMHG | HEART RATE: 149 BPM | TEMPERATURE: 98 F | WEIGHT: 200 LBS

## 2021-05-14 PROCEDURE — 94640 AIRWAY INHALATION TREATMENT: CPT

## 2021-05-14 PROCEDURE — 74011000250 HC RX REV CODE- 250: Performed by: EMERGENCY MEDICINE

## 2021-05-14 PROCEDURE — 96372 THER/PROPH/DIAG INJ SC/IM: CPT

## 2021-05-14 PROCEDURE — 74011250636 HC RX REV CODE- 250/636: Performed by: EMERGENCY MEDICINE

## 2021-05-14 RX ORDER — PREDNISONE 20 MG/1
TABLET ORAL
Qty: 21 TAB | Refills: 0 | OUTPATIENT
Start: 2021-05-14 | End: 2021-11-03

## 2021-05-14 RX ORDER — IPRATROPIUM BROMIDE AND ALBUTEROL SULFATE 2.5; .5 MG/3ML; MG/3ML
3 SOLUTION RESPIRATORY (INHALATION)
Status: COMPLETED | OUTPATIENT
Start: 2021-05-14 | End: 2021-05-14

## 2021-05-14 RX ADMIN — IPRATROPIUM BROMIDE AND ALBUTEROL SULFATE 3 ML: .5; 3 SOLUTION RESPIRATORY (INHALATION) at 00:02

## 2021-05-14 RX ADMIN — METHYLPREDNISOLONE SODIUM SUCCINATE 125 MG: 125 INJECTION, POWDER, FOR SOLUTION INTRAMUSCULAR; INTRAVENOUS at 00:24

## 2021-05-14 RX ADMIN — IPRATROPIUM BROMIDE AND ALBUTEROL SULFATE 3 ML: .5; 3 SOLUTION RESPIRATORY (INHALATION) at 01:06

## 2021-05-14 RX ADMIN — IPRATROPIUM BROMIDE AND ALBUTEROL SULFATE 3 ML: .5; 3 SOLUTION RESPIRATORY (INHALATION) at 00:26

## 2021-05-14 NOTE — ED NOTES
Walked into room with EKG machine. Patient stating to Ovi Kerr RN and Nikita Meza RN. Patient refusing blood work, EKG, cardiac/hemodynamic monitoring.  Patient states \"I need solumedrol as a shot, not as an IV\"

## 2021-05-14 NOTE — ED NOTES
Patient refused cardiac monitoring, pulse ox monitoring, and EKG. Patient verbally ugly with this RN stating she does not need any of this and she refuses all of this. She knows it is her asthma. Patient demanding breathing treatment and solumedrol IM and that is it.

## 2021-05-14 NOTE — ED TRIAGE NOTES
Patient arrives from home c/o shortness of breath, increasing throughout the day. Patient working hard to breath in triage. 89-92% on room air. Hx of asthma. AOX4. Tightness and wheezing heard bilaterally.

## 2021-05-14 NOTE — ED PROVIDER NOTES
EMERGENCY DEPARTMENT HISTORY AND PHYSICAL EXAM    Date: 5/13/2021  Patient Name: Ryan Stringer    History of Presenting Illness     Chief Complaint   Patient presents with    Shortness of Breath         History Provided By: Patient    Ryan Stringer is a 31 y.o. female who presents to the emergency department C/O asthma exacerbation. Patient states she has history of asthma. .  States that her shortness of breath started increasing throughout the day. 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 States she was tested for Covid yesterday and was negative. She denies any chest pain and states that her condition is not related to her heart. Denies any other complaints        PCP: Jas Jameson MD    Current Facility-Administered Medications   Medication Dose Route Frequency Provider Last Rate Last Admin    magnesium sulfate 2 g/50 ml IVPB (premix or compounded)  2 g IntraVENous ONCE Savage Mcgrath DO         Current Outpatient Medications   Medication Sig Dispense Refill    predniSONE (DELTASONE) 20 mg tablet Take 3 pills for 2 days, 2.5 pills for 2 days, 2 pills for 2 days, 1.5 pills for 2 days, 1 pills for 2 days, 1/2 pill for 2 days 21 Tab 0    methylPREDNISolone (Medrol, Mateo,) 4 mg tablet Taper as indicated on packaging 1 Dose Pack 0    albuterol (PROVENTIL HFA, VENTOLIN HFA, PROAIR HFA) 90 mcg/actuation inhaler Take 2 Puffs by inhalation every four (4) hours as needed for Wheezing. 1 Inhaler 1    budesonide-formoteroL (SYMBICORT) 160-4.5 mcg/actuation HFAA Take 2 Puffs by inhalation two (2) times a day. 1 Inhaler 0    ipratropium-albuteroL (COMBIVENT RESPIMAT)  mcg/actuation inhaler Take 1 Puff by inhalation every six (6) hours. 1 Inhaler 0    albuterol-ipratropium (DUO-NEB) 2.5 mg-0.5 mg/3 ml nebu 3 mL by Nebulization route every four (4) hours as needed (sob).  30 Nebule 1       Past History     Past Medical History:  Past Medical History:   Diagnosis Date    Asthma     Ill-defined condition     ovarian cyst    Neurological disorder     migraines       Past Surgical History:  Past Surgical History:   Procedure Laterality Date    HX GYN       x 2, btl       Family History:  History reviewed. No pertinent family history. Social History:  Social History     Tobacco Use    Smoking status: Current Every Day Smoker     Packs/day: 0.25    Smokeless tobacco: Never Used   Substance Use Topics    Alcohol use: Yes     Comment: rarely    Drug use: No       Allergies: Allergies   Allergen Reactions    Latex Rash    Ativan [Lorazepam] Other (comments)     Aggitation    Phenergan [Promethazine] Rash    Zoloft [Sertraline] Rash         Review of Systems   Review of Systems   Constitutional: Negative for fatigue and fever. Respiratory: Positive for cough, chest tightness, shortness of breath and wheezing. Cardiovascular: Negative for chest pain and palpitations. Gastrointestinal: Negative for abdominal pain, nausea and vomiting. All other systems reviewed and are negative. All other systems reviewed and are negative.     Physical Exam     Vitals:    21 2357   BP: (!) 150/98   Pulse: (!) 149   Resp: 30   Temp: 98 °F (36.7 °C)   SpO2: 90%   Weight: 90.7 kg (200 lb)   Height: 5' 5\" (1.651 m)     Physical Exam    Nursing notes and vital signs reviewed    Constitutional: Non toxic appearing, moderate distress, appearing stated age  Head: Normocephalic, Atraumatic  Eyes: PERRL, EOMI, No conjunctival injection  Ears: external ears normal  Nose: No rhinorrhea, external nose normal  Throat: mucous membranes moist  Neck: symmetric, trachea midline, no obvious swelling, no JVD, no obvious deformity  Cardiovascular: Regular rate and rhythm, no murmurs  Chest: No palpable tenderness, structural deformity or crepitus  Lungs: Coarse wheezes in all lung fields no stridor, increased work of breathing and chest excursion bilaterally  Abdomen: Soft, non tender, non distended, normoactive bowel sounds, No rigidity, no peritoneal signs  Musculoskeletal:  No evidence of obvious deformity to the back, extremities, no LE edema  Skin: Warm, dry, No obvious rashes  Neuro: Alert and oriented x 3, CN 2-12 intact, normal speech, strength and sensation full and symmetric bilaterally  Psychiatric: Normal mood and affect      Diagnostic Study Results     Labs -   No results found for this or any previous visit (from the past 72 hour(s)). Radiologic Studies -   No orders to display     CT Results  (Last 48 hours)    None        CXR Results  (Last 48 hours)    None          Medications given in the ED-  Medications   magnesium sulfate 2 g/50 ml IVPB (premix or compounded) (has no administration in time range)   albuterol-ipratropium (DUO-NEB) 2.5 MG-0.5 MG/3 ML (3 mL Nebulization Given 5/14/21 0002)   methylPREDNISolone (PF) (Solu-MEDROL) injection 125 mg (125 mg IntraMUSCular Given 5/14/21 0024)   albuterol-ipratropium (DUO-NEB) 2.5 MG-0.5 MG/3 ML (3 mL Nebulization Given 5/14/21 0026)   albuterol-ipratropium (DUO-NEB) 2.5 MG-0.5 MG/3 ML (3 mL Nebulization Given 5/14/21 0106)         Medical Decision Making     I reviewed the vital signs, available nursing notes, past medical history, past surgical history, family history and social history. Vital Signs interpretation- I have reviewed the patient's vital signs. Pulse Oximetry interpretation - 90% on Room air     Cardiac Monitor interpretation:  Rate: 149 bpm  Rhythm: sinus    EKG interpretation: (Preliminary)  EKG interpretation by Dr. Harleen Amador    Patient refused EKG    Records Reviewed: Nursing Notes and Old Medical Records    Procedures:  Procedures    ED Course & MDM:   Patient does appear to be having a severe asthma exacerbation. I initially ordered IV Solu-Medrol, IV magnesium, DuoNeb breathing treatment, blood work and chest x-ray. The patient refused the IV, blood work, x-ray.   She was agreeable to the [facility] and stated that she wanted the Solu-Medrol not as an IV but as a muscle shot. Patient was given 3 DuoNeb treatments with significant improvement. She still has some mild wheezes but states that she is feeling much better and is comfortable going home. Recommended patient continue to use albuterol nebulizer or inhaler every 4 hours as needed and to take the prednisone as directed. Recommended to follow up with a primary care physician as soon as possible or to come back to the emergency department if symptoms worsen despite treatment. They understand and agree plan. Diagnosis and Disposition         DISCHARGE NOTE:    Patricia Carrasquillo's  results have been reviewed with her. She has been counseled regarding her diagnosis, treatment, and plan. She verbally conveys understanding and agreement of the signs, symptoms, diagnosis, treatment and prognosis and additionally agrees to follow up as discussed. She also agrees with the care-plan and conveys that all of her questions have been answered. I have also provided discharge instructions for her that include: educational information regarding their diagnosis and treatment, and list of reasons why they would want to return to the ED prior to their follow-up appointment, should her condition change. She has been provided with education for proper emergency department utilization. CLINICAL IMPRESSION:    1. Severe persistent asthma with acute exacerbation        PLAN:  1. D/C Home  2. Current Discharge Medication List      START taking these medications    Details   predniSONE (DELTASONE) 20 mg tablet Take 3 pills for 2 days, 2.5 pills for 2 days, 2 pills for 2 days, 1.5 pills for 2 days, 1 pills for 2 days, 1/2 pill for 2 days  Qty: 21 Tab, Refills: 0  Start date: 5/14/2021           3.    Follow-up Information     Follow up With Specialties Details Why Contact Info    Shelly Zhang MD Internal Medicine   [address] [phone]  [phone] [facility] EMERGENCY DEPT Emergency Medicine Go to   [address] [place] [address]  [phone]        _______________________________      Please note that this dictation was completed with [employer] the computer voice recognition software. Quite often unanticipated grammatical, syntax, homophones, and other interpretive errors are inadvertently transcribed by the computer software. Please disregard these errors. Please excuse any errors that have escaped final proofreading.

## 2021-09-14 NOTE — DISCHARGE INSTRUCTIONS
Patient Education     Learning About Asthma Triggers  What are triggers? When you have asthma, certain things can make your symptoms worse. These are called triggers. They include:  · Cigarette smoke or air pollution. · Things you are allergic to, such as:  ¨ Pollen, mold, or dust mites. ¨ Pet hair, skin, or saliva. · Illnesses, like colds, flu, or pneumonia. · Exercise. · Dry, cold air. How do triggers affect asthma? Triggers can make it harder for your lungs to work as they should and can lead to sudden difficulty breathing and other symptoms. When you are around a trigger, an asthma attack is more likely. If your symptoms are severe, you may need emergency treatment or have to go to the hospital for treatment. If you know what your triggers are and can avoid them, you may be able to prevent asthma attacks, reduce how often you have them, and make them less severe. What can you do to avoid triggers? The first thing is to know your triggers. When you are having symptoms, note the things around you that might be causing them. Then look for patterns in what may be triggering your symptoms. Record your triggers on a piece of paper or in an asthma diary. When you have your list of possible triggers, work with your doctor to find ways to avoid them. You also can check how well your lungs are working by measuring your peak expiratory flow (PEF) throughout the day. Your PEF may drop when you are near things that trigger symptoms. Here are some ways to avoid a few common triggers. · Do not smoke or allow others to smoke around you. If you need help quitting, talk to your doctor about stop-smoking programs and medicines. These can increase your chances of quitting for good. · If there is a lot of pollution, pollen, or dust outside, stay at home and keep your windows closed. Use an air conditioner or air filter in your home.  Check your local weather report or newspaper for air quality and pollen msg sent with scheduling info for Dr Nay Couch. Also advised to update insurance info, no info in record at this time. reports. · Get a flu shot every year. Talk to your doctor about getting a pneumococcal shot. Wash your hands often to prevent infections. · Avoid exercising outdoors in cold weather. If you are outdoors in cold weather, wear a scarf around your face and breathe through your nose. How can you manage an asthma attack? · If you have an asthma action plan, follow the plan. In general:  ¨ Use your quick-relief inhaler as directed by your doctor. If your symptoms do not get better after you use your medicine, have someone take you to the emergency room. Call an ambulance if needed. ¨ If your doctor has given you other inhaled medicines or steroid pills, take them as directed. Where can you learn more? Go to [employer]  Enter M564 in the search box to learn more about \"Learning About Asthma Triggers. \"   © 1434-2583 [employer]. Care instructions adapted under license by [facility] (which disclaims liability or warranty for this information). This care instruction is for use with your licensed healthcare professional. If you have questions about a medical condition or this instruction, always ask your healthcare professional. [place] [address] any warranty or liability for your use of this information. Content Version: 49.1.358201;  Last Revised: February 23, 2012

## 2021-11-03 ENCOUNTER — HOSPITAL ENCOUNTER (EMERGENCY)
Age: 31
Discharge: HOME | End: 2021-11-03
Attending: EMERGENCY MEDICINE
Payer: COMMERCIAL

## 2021-11-03 VITALS
DIASTOLIC BLOOD PRESSURE: 92 MMHG | TEMPERATURE: 97.5 F | HEART RATE: 88 BPM | OXYGEN SATURATION: 93 % | RESPIRATION RATE: 19 BRPM | SYSTOLIC BLOOD PRESSURE: 126 MMHG

## 2021-11-03 DIAGNOSIS — K08.89 PAIN, DENTAL: ICD-10-CM

## 2021-11-03 DIAGNOSIS — R51.9 FACIAL PAIN: Primary | ICD-10-CM

## 2021-11-03 DIAGNOSIS — K02.9 DENTAL CARIES: ICD-10-CM

## 2021-11-03 PROCEDURE — 99283 EMERGENCY DEPT VISIT LOW MDM: CPT

## 2021-11-03 PROCEDURE — 74011250637 HC RX REV CODE- 250/637: Performed by: PHYSICIAN ASSISTANT

## 2021-11-03 RX ORDER — CLINDAMYCIN HYDROCHLORIDE 300 MG/1
300 CAPSULE ORAL 3 TIMES DAILY
Qty: 21 CAPSULE | Refills: 0 | Status: SHIPPED | OUTPATIENT
Start: 2021-11-03 | End: 2021-11-10

## 2021-11-03 RX ORDER — TRAMADOL HYDROCHLORIDE 50 MG/1
50 TABLET ORAL
Qty: 8 TABLET | Refills: 0 | Status: SHIPPED | OUTPATIENT
Start: 2021-11-03 | End: 2021-11-06

## 2021-11-03 RX ORDER — HYDROCODONE BITARTRATE AND ACETAMINOPHEN 5; 325 MG/1; MG/1
1 TABLET ORAL
Status: COMPLETED | OUTPATIENT
Start: 2021-11-03 | End: 2021-11-03

## 2021-11-03 RX ADMIN — HYDROCODONE BITARTRATE AND ACETAMINOPHEN 1 TABLET: 5; 325 TABLET ORAL at 23:23

## 2021-11-04 NOTE — ED PROVIDER NOTES
EMERGENCY DEPARTMENT HISTORY AND PHYSICAL EXAM    Date: 11/3/2021  Patient Name: Ksenia Wilkins    History of Presenting Illness     Time Seen: 11:04 PM    Chief Complaint   Patient presents with    Dental Pain       History Provided By: Patient    Additional History (Context):   Ksenia Wilkins is a 27 y.o. female presents emergency room with complaints of left lower jaw/dental pain. Patient states her pain started approximately 1 hour ago. Had recently broken tooth in the area. Had already known there was a bad tooth in the area. No bleeding or drainage. Tried to take some extra strength Tylenol but did not help her pain. Does not have dental insurance. Does not have a dentist.    PCP: Mady Colon MD    Current Outpatient Medications   Medication Sig Dispense Refill    traMADoL (Ultram) 50 mg tablet Take 1 Tablet by mouth every six (6) hours as needed for Pain for up to 3 days. Max Daily Amount: 200 mg. 8 Tablet 0    clindamycin (CLEOCIN) 300 mg capsule Take 1 Capsule by mouth three (3) times daily for 7 days. 21 Capsule 0    albuterol (PROVENTIL HFA, VENTOLIN HFA, PROAIR HFA) 90 mcg/actuation inhaler Take 2 Puffs by inhalation every four (4) hours as needed for Wheezing. 1 Inhaler 1    budesonide-formoteroL (SYMBICORT) 160-4.5 mcg/actuation HFAA Take 2 Puffs by inhalation two (2) times a day. 1 Inhaler 0    ipratropium-albuteroL (COMBIVENT RESPIMAT)  mcg/actuation inhaler Take 1 Puff by inhalation every six (6) hours. 1 Inhaler 0    albuterol-ipratropium (DUO-NEB) 2.5 mg-0.5 mg/3 ml nebu 3 mL by Nebulization route every four (4) hours as needed (sob).  30 Nebule 1       Past History     Past Medical History:  Past Medical History:   Diagnosis Date    Asthma     Ill-defined condition     ovarian cyst    Neurological disorder     migraines       Past Surgical History:  Past Surgical History:   Procedure Laterality Date    HX GYN       x 2, btl       Family History:  No family history on file. Social History:  Social History     Tobacco Use    Smoking status: Former Smoker     Packs/day: 0.25    Smokeless tobacco: Never Used   Substance Use Topics    Alcohol use: Yes     Comment: rarely    Drug use: No       Allergies: Allergies   Allergen Reactions    Latex Rash    Ativan [Lorazepam] Other (comments)     Aggitation    Phenergan [Promethazine] Rash    Zoloft [Sertraline] Rash         Review of Systems   Review of Systems   Constitutional: Negative for fever. HENT: Positive for dental problem. Negative for facial swelling and mouth sores. Neurological: Negative for headaches. All other systems reviewed and are negative. Physical Exam     Vitals:    11/03/21 2301   BP: (!) 126/92   Pulse: 88   Resp: 19   Temp: 97.5 °F (36.4 °C)   SpO2: 93%     Physical Exam  Vitals and nursing note reviewed. Constitutional:       General: She is not in acute distress. Appearance: She is well-developed, well-groomed and overweight. She is not ill-appearing. Comments: Tearful 65-year-old female. Vital signs stable. Cooperative. HENT:      Head:      Jaw: Tenderness and pain on movement present. No trismus or swelling. Mouth/Throat:      Mouth: Mucous membranes are moist. No oral lesions. Dentition: Abnormal dentition. Dental tenderness and dental caries present. No gingival swelling or dental abscesses. Pharynx: Oropharynx is clear. Comments: Teeth 19/20 -premolar/molar region. There is extensive dental issues/caries noted. Tooth is broken. Approximately one quarter of the tooth is missing. Tooth is still stable. Tender to percussion. No bleeding or drainage coming from the area. No surrounding soft tissue swelling or obvious abscess seen. Eyes:      Extraocular Movements: Extraocular movements intact. Conjunctiva/sclera: Conjunctivae normal.      Pupils: Pupils are equal, round, and reactive to light.    Cardiovascular: Rate and Rhythm: Normal rate and regular rhythm. Heart sounds: Normal heart sounds. Pulmonary:      Effort: Pulmonary effort is normal.      Breath sounds: Normal breath sounds. Musculoskeletal:      Cervical back: Neck supple. Lymphadenopathy:      Cervical: No cervical adenopathy. Neurological:      Mental Status: She is alert and oriented to person, place, and time. Psychiatric:         Mood and Affect: Mood is anxious. Affect is tearful. Behavior: Behavior is cooperative. Nursing note and vitals reviewed         Diagnostic Study Results     Labs -   No results found for this or any previous visit (from the past 12 hour(s)). Radiologic Studies   No orders to display     CT Results  (Last 48 hours)    None        CXR Results  (Last 48 hours)    None            Medical Decision Making   I am the first provider for this patient. I reviewed the vital signs, available nursing notes, past medical history, past surgical history, family history and social history. Vital Signs-Reviewed the patient's vital signs. Records Reviewed: Nursing Notes    DDX:  Facial pain/dental pain  Dental caries    Provider Notes:   27 y.o. female     Procedures:  Procedures    ED Course:   Initial assessment performed. The patients presenting problems have been discussed, and they are in agreement with the care plan formulated and outlined with them. I have encouraged them to ask questions as they arise throughout their visit. ED Physician Discussion Note:   No obvious signs of infection  We will give her some stronger for pain here  Advised patient she needs to see dentist to soon as possible  We will still plan to put on antibiotics just in case  Continue topical antibiotic medication  Good antibacterial mouthwash    Diagnosis and Disposition       DISCHARGE NOTE:  Patricia Carrasquillo's  results have been reviewed with her. She has been counseled regarding her diagnosis, treatment, and plan. She verbally conveys understanding and agreement of the signs, symptoms, diagnosis, treatment and prognosis and additionally agrees to follow up as discussed. She also agrees with the care-plan and conveys that all of her questions have been answered. I have also provided discharge instructions for her that include: educational information regarding their diagnosis and treatment, and list of reasons why they would want to return to the ED prior to their follow-up appointment, should her condition change. She has been provided with education for proper emergency department utilization. CLINICAL IMPRESSION:    1. Facial pain    2. Pain, dental    3. Dental caries        PLAN:  1. D/C Home  2. Discharge Medication List as of 11/3/2021 11:25 PM      START taking these medications    Details   traMADoL (Ultram) 50 mg tablet Take 1 Tablet by mouth every six (6) hours as needed for Pain for up to 3 days. Max Daily Amount: 200 mg., Normal, Disp-8 Tablet, R-0      clindamycin (CLEOCIN) 300 mg capsule Take 1 Capsule by mouth three (3) times daily for 7 days. , Normal, Disp-21 Capsule, R-0         CONTINUE these medications which have NOT CHANGED    Details   albuterol (PROVENTIL HFA, VENTOLIN HFA, PROAIR HFA) 90 mcg/actuation inhaler Take 2 Puffs by inhalation every four (4) hours as needed for Wheezing., Normal, Disp-1 Inhaler, R-1      budesonide-formoteroL (SYMBICORT) 160-4.5 mcg/actuation HFAA Take 2 Puffs by inhalation two (2) times a day., Normal, Disp-1 Inhaler, R-0      ipratropium-albuteroL (COMBIVENT RESPIMAT)  mcg/actuation inhaler Take 1 Puff by inhalation every six (6) hours. , Normal, Disp-1 Inhaler, R-0      albuterol-ipratropium (DUO-NEB) 2.5 mg-0.5 mg/3 ml nebu 3 mL by Nebulization route every four (4) hours as needed (sob). , Normal, Disp-30 Nebule, R-1           3.    Follow-up Information     Follow up With Specialties Details Why Contact Info    Dentist ASAP  Call  Call to set up appointment with a dentist to soon as possible regarding your pain and dental issues     [facility] EMERGENCY DE[facility] Emergency Medicine  If symptoms worsen, As needed [address] [facility] [id number]  [phone]        ____________________________________     Please note that this dictation was completed with [employer], the computer voice recognition software. Quite often unanticipated grammatical, syntax, homophones, and other interpretive errors are inadvertently transcribed by the computer software. Please disregard these errors. Please excuse any errors that have escaped final proofreading.

## 2021-11-04 NOTE — DISCHARGE INSTRUCTIONS
Dentist to soon as possible  Tylenol or ibuprofen for mild to moderate pain  Medication as prescribed for severe pain  Antibiotic as prescribed  Avoid chewing on left side of mouth  Recommend good antibacterial mouthwash  Recommend continued use of Shawn Duke / Naomie Leigh

## 2021-11-04 NOTE — ED TRIAGE NOTES
Patient reports LEFT lower dental pain that started x1 hr ago.  Patient grabbing at LEFT side of mouth    Admits to ES Tylenol and numbing cream at home

## 2021-11-23 ENCOUNTER — HOSPITAL ENCOUNTER (EMERGENCY)
Age: 31
Discharge: HOME | End: 2021-11-23
Attending: EMERGENCY MEDICINE
Payer: COMMERCIAL

## 2021-11-23 ENCOUNTER — APPOINTMENT (OUTPATIENT)
Dept: GENERAL RADIOLOGY | Age: 31
End: 2021-11-23
Attending: PHYSICIAN ASSISTANT
Payer: COMMERCIAL

## 2021-11-23 ENCOUNTER — APPOINTMENT (OUTPATIENT)
Dept: CT IMAGING | Age: 31
End: 2021-11-23
Attending: PHYSICIAN ASSISTANT
Payer: COMMERCIAL

## 2021-11-23 VITALS
TEMPERATURE: 97.5 F | OXYGEN SATURATION: 95 % | HEIGHT: 65 IN | BODY MASS INDEX: 33.32 KG/M2 | HEART RATE: 89 BPM | RESPIRATION RATE: 20 BRPM | SYSTOLIC BLOOD PRESSURE: 128 MMHG | WEIGHT: 200 LBS | DIASTOLIC BLOOD PRESSURE: 68 MMHG

## 2021-11-23 DIAGNOSIS — R10.12 ABDOMINAL PAIN, LUQ (LEFT UPPER QUADRANT): ICD-10-CM

## 2021-11-23 DIAGNOSIS — R11.2 NON-INTRACTABLE VOMITING WITH NAUSEA, UNSPECIFIED VOMITING TYPE: ICD-10-CM

## 2021-11-23 DIAGNOSIS — J45.41 MODERATE PERSISTENT ASTHMA WITH ACUTE EXACERBATION: Primary | ICD-10-CM

## 2021-11-23 LAB
ALBUMIN SERPL-MCNC: 3.8 G/DL (ref 3.4–5)
ALBUMIN/GLOB SERPL: 1 {RATIO} (ref 0.8–1.7)
ALP SERPL-CCNC: 62 U/L (ref 45–117)
ALT SERPL-CCNC: 19 U/L (ref 13–56)
ANION GAP SERPL CALC-SCNC: 9 MMOL/L (ref 3–18)
APPEARANCE UR: CLEAR
AST SERPL-CCNC: 9 U/L (ref 10–38)
BACTERIA URNS QL MICRO: ABNORMAL /HPF
BASOPHILS # BLD: 0.1 K/UL (ref 0–0.1)
BASOPHILS NFR BLD: 0 % (ref 0–2)
BILIRUB SERPL-MCNC: 0.8 MG/DL (ref 0.2–1)
BILIRUB UR QL: NEGATIVE
BUN SERPL-MCNC: 11 MG/DL (ref 7–18)
BUN/CREAT SERPL: 15 (ref 12–20)
CALCIUM SERPL-MCNC: 9.3 MG/DL (ref 8.5–10.1)
CHLORIDE SERPL-SCNC: 109 MMOL/L (ref 100–111)
CO2 SERPL-SCNC: 20 MMOL/L (ref 21–32)
COLOR UR: YELLOW
CREAT SERPL-MCNC: 0.74 MG/DL (ref 0.6–1.3)
DIFFERENTIAL METHOD BLD: ABNORMAL
EOSINOPHIL # BLD: 0.9 K/UL (ref 0–0.4)
EOSINOPHIL NFR BLD: 5 % (ref 0–5)
EPITH CASTS URNS QL MICRO: ABNORMAL /LPF (ref 0–5)
ERYTHROCYTE [DISTWIDTH] IN BLOOD BY AUTOMATED COUNT: 12.7 % (ref 11.6–14.5)
GLOBULIN SER CALC-MCNC: 3.7 G/DL (ref 2–4)
GLUCOSE SERPL-MCNC: 94 MG/DL (ref 74–99)
GLUCOSE UR STRIP.AUTO-MCNC: NEGATIVE MG/DL
HCG UR QL: NEGATIVE
HCT VFR BLD AUTO: 51 % (ref 35–45)
HGB BLD-MCNC: 17.6 G/DL (ref 12–16)
HGB UR QL STRIP: NEGATIVE
IMM GRANULOCYTES # BLD AUTO: 0.1 K/UL (ref 0–0.04)
IMM GRANULOCYTES NFR BLD AUTO: 0 % (ref 0–0.5)
KETONES UR QL STRIP.AUTO: 15 MG/DL
LEUKOCYTE ESTERASE UR QL STRIP.AUTO: ABNORMAL
LYMPHOCYTES # BLD: 3.7 K/UL (ref 0.9–3.6)
LYMPHOCYTES NFR BLD: 22 % (ref 21–52)
MCH RBC QN AUTO: 30.8 PG (ref 24–34)
MCHC RBC AUTO-ENTMCNC: 34.5 G/DL (ref 31–37)
MCV RBC AUTO: 89.3 FL (ref 78–100)
MONOCYTES # BLD: 1.2 K/UL (ref 0.05–1.2)
MONOCYTES NFR BLD: 7 % (ref 3–10)
MUCOUS THREADS URNS QL MICRO: ABNORMAL /LPF
NEUTS SEG # BLD: 10.8 K/UL (ref 1.8–8)
NEUTS SEG NFR BLD: 65 % (ref 40–73)
NITRITE UR QL STRIP.AUTO: NEGATIVE
NRBC # BLD: 0 K/UL (ref 0–0.01)
NRBC BLD-RTO: 0 PER 100 WBC
PH UR STRIP: 6.5 [PH] (ref 5–8)
PLATELET # BLD AUTO: 347 K/UL (ref 135–420)
PMV BLD AUTO: 9.4 FL (ref 9.2–11.8)
POTASSIUM SERPL-SCNC: 3.9 MMOL/L (ref 3.5–5.5)
PROT SERPL-MCNC: 7.5 G/DL (ref 6.4–8.2)
PROT UR STRIP-MCNC: NEGATIVE MG/DL
RBC # BLD AUTO: 5.71 M/UL (ref 4.2–5.3)
RBC #/AREA URNS HPF: NEGATIVE /HPF (ref 0–5)
SARS-COV-2, COV2: NORMAL
SODIUM SERPL-SCNC: 138 MMOL/L (ref 136–145)
SP GR UR REFRACTOMETRY: 1.02 (ref 1–1.03)
UROBILINOGEN UR QL STRIP.AUTO: 1 EU/DL (ref 0.2–1)
WBC # BLD AUTO: 16.8 K/UL (ref 4.6–13.2)
WBC URNS QL MICRO: ABNORMAL /HPF (ref 0–5)

## 2021-11-23 PROCEDURE — 96375 TX/PRO/DX INJ NEW DRUG ADDON: CPT

## 2021-11-23 PROCEDURE — 81001 URINALYSIS AUTO W/SCOPE: CPT

## 2021-11-23 PROCEDURE — 81025 URINE PREGNANCY TEST: CPT

## 2021-11-23 PROCEDURE — 74177 CT ABD & PELVIS W/CONTRAST: CPT

## 2021-11-23 PROCEDURE — 74011250636 HC RX REV CODE- 250/636: Performed by: PHYSICIAN ASSISTANT

## 2021-11-23 PROCEDURE — U0005 INFEC AGEN DETEC AMPLI PROBE: HCPCS

## 2021-11-23 PROCEDURE — 74011000250 HC RX REV CODE- 250: Performed by: PHYSICIAN ASSISTANT

## 2021-11-23 PROCEDURE — 94640 AIRWAY INHALATION TREATMENT: CPT

## 2021-11-23 PROCEDURE — 80053 COMPREHEN METABOLIC PANEL: CPT

## 2021-11-23 PROCEDURE — 71045 X-RAY EXAM CHEST 1 VIEW: CPT

## 2021-11-23 PROCEDURE — 85025 COMPLETE CBC W/AUTO DIFF WBC: CPT

## 2021-11-23 PROCEDURE — 96374 THER/PROPH/DIAG INJ IV PUSH: CPT

## 2021-11-23 PROCEDURE — 99284 EMERGENCY DEPT VISIT MOD MDM: CPT

## 2021-11-23 PROCEDURE — 74011000636 HC RX REV CODE- 636: Performed by: EMERGENCY MEDICINE

## 2021-11-23 RX ORDER — PREDNISONE 10 MG/1
TABLET ORAL
Qty: 21 TABLET | Refills: 0 | Status: SHIPPED | OUTPATIENT
Start: 2021-11-23 | End: 2021-11-26

## 2021-11-23 RX ORDER — ONDANSETRON 4 MG/1
4 TABLET, ORALLY DISINTEGRATING ORAL
Qty: 12 TABLET | Refills: 0 | Status: SHIPPED | OUTPATIENT
Start: 2021-11-23 | End: 2021-11-26

## 2021-11-23 RX ORDER — DICYCLOMINE HYDROCHLORIDE 10 MG/1
10 CAPSULE ORAL 4 TIMES DAILY
Qty: 12 CAPSULE | Refills: 0 | Status: SHIPPED | OUTPATIENT
Start: 2021-11-23 | End: 2021-11-26

## 2021-11-23 RX ORDER — KETOROLAC TROMETHAMINE 30 MG/ML
15 INJECTION, SOLUTION INTRAMUSCULAR; INTRAVENOUS
Status: COMPLETED | OUTPATIENT
Start: 2021-11-23 | End: 2021-11-23

## 2021-11-23 RX ORDER — BUDESONIDE AND FORMOTEROL FUMARATE DIHYDRATE 160; 4.5 UG/1; UG/1
2 AEROSOL RESPIRATORY (INHALATION) 2 TIMES DAILY
Qty: 1 EACH | Refills: 0 | Status: SHIPPED | OUTPATIENT
Start: 2021-11-23

## 2021-11-23 RX ORDER — IPRATROPIUM BROMIDE AND ALBUTEROL SULFATE 2.5; .5 MG/3ML; MG/3ML
3 SOLUTION RESPIRATORY (INHALATION)
Status: COMPLETED | OUTPATIENT
Start: 2021-11-23 | End: 2021-11-23

## 2021-11-23 RX ADMIN — METHYLPREDNISOLONE SODIUM SUCCINATE 125 MG: 125 INJECTION, POWDER, FOR SOLUTION INTRAMUSCULAR; INTRAVENOUS at 18:52

## 2021-11-23 RX ADMIN — KETOROLAC TROMETHAMINE 15 MG: 30 INJECTION, SOLUTION INTRAMUSCULAR; INTRAVENOUS at 20:21

## 2021-11-23 RX ADMIN — IPRATROPIUM BROMIDE AND ALBUTEROL SULFATE 3 ML: .5; 3 SOLUTION RESPIRATORY (INHALATION) at 18:52

## 2021-11-23 RX ADMIN — IPRATROPIUM BROMIDE AND ALBUTEROL SULFATE 3 ML: .5; 3 SOLUTION RESPIRATORY (INHALATION) at 20:21

## 2021-11-23 RX ADMIN — IOPAMIDOL 100 ML: 612 INJECTION, SOLUTION INTRAVENOUS at 19:32

## 2021-11-23 NOTE — ED PROVIDER NOTES
EMERGENCY DEPARTMENT HISTORY AND PHYSICAL EXAM    Date: 11/23/2021  Patient Name: Selma Hagen    History of Presenting Illness     Chief Complaint   Patient presents with    Abdominal Pain    Shortness of Breath    Vomiting         History Provided By: Patient    6:33 PM  Selma Hagen is a 27 y.o. female with PMHX of asthma, ovarian cysts who presents to the emergency department C/O cough and shortness of breath which began yesterday. She also reports left lower quadrant abdominal pain for the last several days that worsened today. Overnight last night she had several bouts of nausea and vomiting. Patient states that she and her family had a \"stomach bug\" which consisted of 2 days of nausea vomiting and diarrhea last week; She tested negative for flu, RSV and COVID-19 at that time. All the symptoms seem to resolve and she was doing better until yesterday. Patient has used albuterol inhaler but states she ran out of her Symbicort and Combivent 20 days ago due to new insurance and need for new prescription. It is not vaccinated against COVID-19. Last menstrual period earlier this month but only spotted for 1 day, there is a chance of pregnancy, not on any birth control. Pt denies fever, ear pain, sore throat, and any other sxs or complaints. PCP: Joe Barton MD    Current Facility-Administered Medications   Medication Dose Route Frequency Provider Last Rate Last Admin    ketorolac (TORADOL) injection 15 mg  15 mg IntraVENous NOW Glenora Encinas PA        albuterol-ipratropium (DUO-NEB) 2.5 MG-0.5 MG/3 ML  3 mL Nebulization NOW [facility]         Current Outpatient Medications   Medication Sig Dispense Refill    predniSONE (STERAPRED DS) 10 mg dose pack Use per pack instructions. 21 Tablet 0    budesonide-formoteroL (Symbicort) 160-4.5 mcg/actuation HFAA Take 2 Puffs by inhalation two (2) times a day.  1 Each 0    ipratropium-albuteroL (Combivent Respimat)  mcg/actuation inhaler Take 1 Puff by inhalation every six (6) hours as needed for Wheezing. 1 Each 0    ondansetron (Zofran ODT) 4 mg disintegrating tablet Take 1 Tablet by mouth every eight (8) hours as needed for Nausea. 12 Tablet 0    dicyclomine (BENTYL) 10 mg capsule Take 1 Capsule by mouth four (4) times daily for 3 days. 12 Capsule 0    albuterol (PROVENTIL HFA, VENTOLIN HFA, PROAIR HFA) 90 mcg/actuation inhaler Take 2 Puffs by inhalation every four (4) hours as needed for Wheezing. 1 Inhaler 1       Past History     Past Medical History:  Past Medical History:   Diagnosis Date    Asthma     Ill-defined condition     ovarian cyst    Neurological disorder     migraines       Past Surgical History:  Past Surgical History:   Procedure Laterality Date    HX GYN       x 2, btl       Family History:  History reviewed. No pertinent family history. Social History:  Social History     Tobacco Use    Smoking status: Former Smoker     Packs/day: 0.25    Smokeless tobacco: Never Used   Substance Use Topics    Alcohol use: Yes     Comment: rarely    Drug use: Never       Allergies: Allergies   Allergen Reactions    Latex Rash    Ativan [Lorazepam] Other (comments)     Aggitation    Phenergan [Promethazine] Rash    Zoloft [Sertraline] Rash         Review of Systems   Review of Systems   Constitutional: Negative for fever. Respiratory: Positive for cough, shortness of breath and wheezing. Gastrointestinal: Positive for abdominal pain, diarrhea (resolved), nausea and vomiting. Genitourinary: Negative for dysuria, vaginal bleeding and vaginal discharge. All other systems reviewed and are negative. Physical Exam     Vitals:    21 1745   BP: (!) 145/87   Pulse: (!) 113   Resp: 28   Temp: 97.5 °F (36.4 °C)   SpO2: 93%   Weight: 90.7 kg (200 lb)   Height: 5' 5\" (1.651 m)     Physical Exam  Vital signs and nursing notes reviewed. CONSTITUTIONAL: Alert.  Well-appearing; well-nourished; in no apparent distress. HEAD: Normocephalic; atraumatic. EYES:  Conjunctiva clear. ENT: TM's normal. External ear normal. Normal nose; no rhinorrhea. Normal pharynx. Moist mucus membranes. NECK: Supple; FROM without difficulty, non-tender; no cervical lymphadenopathy. CV: Normal S1, S2; no murmurs, rubs, or gallops. No chest wall tenderness. RESPIRATORY: Normal chest excursion with respiration; diffuse inspiratory and expiratory wheezes bilaterally. GI: Normal bowel sounds; non-distended; +TTP LUQ; no guarding or rigidity; no palpable organomegaly. No CVA tenderness. SKIN: Normal for age and race; warm; dry; good turgor; no apparent lesions or exudate. NEURO: A & O x3. PSYCH:  Mood and affect appropriate. Diagnostic Study Results     Labs -     Recent Results (from the past 12 hour(s))   CBC WITH AUTOMATED DIFF    Collection Time: 11/23/21  6:00 PM   Result Value Ref Range    WBC 16.8 (H) 4.6 - 13.2 K/uL    RBC 5.71 (H) 4.20 - 5.30 M/uL    HGB 17.6 (H) 12.0 - 16.0 g/dL    HCT 51.0 (H) 35.0 - 45.0 %    MCV 89.3 78.0 - 100.0 FL    MCH 30.8 24.0 - 34.0 PG    MCHC 34.5 31.0 - 37.0 g/dL    RDW 12.7 11.6 - 14.5 %    PLATELET 628 830 - 416 K/uL    MPV 9.4 9.2 - 11.8 FL    NRBC 0.0 0  WBC    ABSOLUTE NRBC 0.00 0.00 - 0.01 K/uL    NEUTROPHILS 65 40 - 73 %    LYMPHOCYTES 22 21 - 52 %    MONOCYTES 7 3 - 10 %    EOSINOPHILS 5 0 - 5 %    BASOPHILS 0 0 - 2 %    IMMATURE GRANULOCYTES 0 0.0 - 0.5 %    ABS. NEUTROPHILS 10.8 (H) 1.8 - 8.0 K/UL    ABS. LYMPHOCYTES 3.7 (H) 0.9 - 3.6 K/UL    ABS. MONOCYTES 1.2 0.05 - 1.2 K/UL    ABS. EOSINOPHILS 0.9 (H) 0.0 - 0.4 K/UL    ABS. BASOPHILS 0.1 0.0 - 0.1 K/UL    ABS. IMM.  GRANS. 0.1 (H) 0.00 - 0.04 K/UL    DF AUTOMATED     METABOLIC PANEL, COMPREHENSIVE    Collection Time: 11/23/21  6:00 PM   Result Value Ref Range    Sodium 138 136 - 145 mmol/L    Potassium 3.9 3.5 - 5.5 mmol/L    Chloride 109 100 - 111 mmol/L    CO2 20 (L) 21 - 32 mmol/L    Anion gap 9 3.0 - 18 mmol/L    Glucose 94 74 - 99 mg/dL    BUN 11 7.0 - 18 MG/DL    Creatinine 0.74 0.6 - 1.3 MG/DL    BUN/Creatinine ratio 15 12 - 20      GFR est AA >60 >60 ml/min/1.73m2    GFR est non-AA >60 >60 ml/min/1.73m2    Calcium 9.3 8.5 - 10.1 MG/DL    Bilirubin, total 0.8 0.2 - 1.0 MG/DL    ALT (SGPT) 19 13 - 56 U/L    AST (SGOT) 9 (L) 10 - 38 U/L    Alk. phosphatase 62 45 - 117 U/L    Protein, total 7.5 6.4 - 8.2 g/dL    Albumin 3.8 3.4 - 5.0 g/dL    Globulin 3.7 2.0 - 4.0 g/dL    A-G Ratio 1.0 0.8 - 1.7     URINALYSIS W/ RFLX MICROSCOPIC    Collection Time: 11/23/21  6:00 PM   Result Value Ref Range    Color YELLOW      Appearance CLEAR      Specific gravity 1.021 1.005 - 1.030      pH (UA) 6.5 5.0 - 8.0      Protein Negative NEG mg/dL    Glucose Negative NEG mg/dL    Ketone 15 (A) NEG mg/dL    Bilirubin Negative NEG      Blood Negative NEG      Urobilinogen 1.0 0.2 - 1.0 EU/dL    Nitrites Negative NEG      Leukocyte Esterase TRACE (A) NEG     URINE MICROSCOPIC ONLY    Collection Time: 11/23/21  6:00 PM   Result Value Ref Range    WBC 1 to 3 0 - 5 /hpf    RBC Negative 0 - 5 /hpf    Epithelial cells 2+ 0 - 5 /lpf    Bacteria 1+ (A) NEG /hpf    Mucus 1+ (A) NEG /lpf   HCG URINE, QL    Collection Time: 11/23/21  6:00 PM   Result Value Ref Range    HCG urine, QL Negative NEG     SARS-COV-2    Collection Time: 11/23/21  6:54 PM   Result Value Ref Range    SARS-CoV-2 Please find results under separate order         Radiologic Studies -   CT ABD PELV W CONT   Final Result      There is no bowel obstruction however there is suggestion of small bowel   intussusception along the left flank which may be transient. Correlate with   clinical findings and follow-up with cross-sectional imaging as clinically   indicated. DARIA Lee informed 7:50 PM November 23, 2021.       XR CHEST PORT   Final Result   No acute process        CT Results  (Last 48 hours)               11/23/21 1942  CT ABD PELV W CONT Final result    Impression:      There is no bowel obstruction however there is suggestion of small bowel   intussusception along the left flank which may be transient. Correlate with   clinical findings and follow-up with cross-sectional imaging as clinically   indicated. DARIA Lee informed 7:50 PM November 23, 2021. Narrative:  EXAM: CT of the abdomen and pelvis       INDICATION: Left upper quadrant pain       COMPARISON: None. TECHNIQUE: Axial CT imaging of the abdomen and pelvis was performed with   intravenous contrast. Multiplanar reformats were generated. One or more dose   reduction techniques were used on this CT: automated exposure control,   adjustment of the mAs and/or kVp according to patient size, and iterative   reconstruction techniques. The specific techniques used on this CT exam have   been documented in the patient's electronic medical record. Digital Imaging and   Communications in Medicine (DICOM) format image data are available to   nonaffiliated external healthcare facilities or entities on a secure, media   free, reciprocally searchable basis with patient authorization for at least a   12-month period after this study. _______________       FINDINGS:       LOWER CHEST: Unremarkable. LIVER, BILIARY: Liver is normal. No biliary dilation. Cholecystectomy       PANCREAS: Normal.       SPLEEN: Normal.       ADRENALS: Normal.       KIDNEYS: Normal.       VASCULATURE: Unremarkable       LYMPH NODES: No enlarged lymph nodes. GASTROINTESTINAL TRACT: No bowel dilation or wall thickening. Appendix is   normal. There is intussusception of small bowel along the left flank best seen   on axial images 50 through 59. There is no resulting small bowel obstruction. Small bowel intussusception is often transient. PELVIC ORGANS: There is a 2 cm right adnexal cyst. Urinary bladder is under   distended therefore difficult to evaluate.  There is suggestion of a 2.7 cm   uterine body fibroid posteriorly. . There is a small amount of free fluid in the   right hemipelvis. Nelia Krista BONES: No acute or aggressive osseous abnormalities identified. There is a 11 mm   sclerotic density in the medullary cavity the left proximal femur suggesting a   bone island. OTHER: None.       _______________               CXR Results  (Last 48 hours)               11/23/21 1841  XR CHEST PORT Final result    Impression:  No acute process       Narrative:  EXAM:  AP Portable Chest X-ray 1 view        INDICATION: Shortness of breath and wheezing       COMPARISON: February 15, 2021       _______________       FINDINGS:  Heart and mediastinal contours are within normal limits for portable   radiograph. Lungs are clear of active disease. There are no pleural effusions. No acute osseous findings. ________________                     Medications given in the ED-  Medications   ketorolac (TORADOL) injection 15 mg (has no administration in time range)   albuterol-ipratropium (DUO-NEB) 2.5 MG-0.5 MG/3 ML (has no administration in time range)   methylPREDNISolone (PF) (Solu-MEDROL) injection 125 mg (125 mg IntraVENous Given 11/23/21 1852)   albuterol-ipratropium (DUO-NEB) 2.5 MG-0.5 MG/3 ML (3 mL Nebulization Given 11/23/21 1852)   iopamidoL (ISOVUE 300) 61 % contrast injection 100 mL (100 mL IntraVENous Given 11/23/21 1932)         Medical Decision Making   I am the first provider for this patient. I reviewed the vital signs, available nursing notes, past medical history, past surgical history, family history and social history. Vital Signs-Reviewed the patient's vital signs. Records Reviewed: Nursing Notes      Procedures:  Procedures    ED Course:  6:33 PM   Initial assessment performed. The patients presenting problems have been discussed, and they are in agreement with the care plan formulated and outlined with them. I have encouraged them to ask questions as they arise throughout their visit. 8:10 PM  Patient states her breathing is improved but still has wheezing bilaterally. She just finished a 5-day course of prednisone 2 days ago which likely explains her elevated white blood cell count. She still has some abdominal discomfort over the left upper quadrant. Will give Toradol, another breathing treatment and plan to discharge home on another course of prednisone and will refill her Combivent and Symbicort. COVID-19 test sent and pending at time but her symptoms seem more consistent with asthma exacerbation. 8:14 PM consult note  Case discussed with general surgeon Dr. Celine Silver. He states it is unlikely that she is having true intussusception but if so, it is likely transient and not causing her pain and there is no sign of associated bowel obstruction. He does not recommend any further imaging or admission for this but patient should monitor and if abdominal pain does not improve or worsen then return to ER for repeat CT. Diagnosis and Disposition       DISCHARGE NOTE:    Patricia Carrasquillo's  results have been reviewed with her. She has been counseled regarding her diagnosis, treatment, and plan. She verbally conveys understanding and agreement of the signs, symptoms, diagnosis, treatment and prognosis and additionally agrees to follow up as discussed. She also agrees with the care-plan and conveys that all of her questions have been answered. I have also provided discharge instructions for her that include: educational information regarding their diagnosis and treatment, and list of reasons why they would want to return to the ED prior to their follow-up appointment, should her condition change. She has been provided with education for proper emergency department utilization. CLINICAL IMPRESSION:    1. Moderate persistent asthma with acute exacerbation    2. Abdominal pain, LUQ (left upper quadrant)    3.  Non-intractable vomiting with nausea, unspecified vomiting type PLAN:  1. D/C Home  2. Current Discharge Medication List      START taking these medications    Details   predniSONE (STERAPRED DS) 10 mg dose pack Use per pack instructions. Qty: 21 Tablet, Refills: 0  Start date: 11/23/2021      ondansetron (Zofran ODT) 4 mg disintegrating tablet Take 1 Tablet by mouth every eight (8) hours as needed for Nausea. Qty: 12 Tablet, Refills: 0  Start date: 11/23/2021      dicyclomine (BENTYL) 10 mg capsule Take 1 Capsule by mouth four (4) times daily for 3 days. Qty: 12 Capsule, Refills: 0  Start date: 11/23/2021, End date: 11/26/2021         CONTINUE these medications which have CHANGED    Details   budesonide-formoteroL (Symbicort) 160-4.5 mcg/actuation HFAA Take 2 Puffs by inhalation two (2) times a day. Qty: 1 Each, Refills: 0  Start date: 11/23/2021      ipratropium-albuteroL (Combivent Respimat)  mcg/actuation inhaler Take 1 Puff by inhalation every six (6) hours as needed for Wheezing. Qty: 1 Each, Refills: 0  Start date: 11/23/2021         STOP taking these medications       albuterol-ipratropium (DUO-NEB) 2.5 mg-0.5 mg/3 ml nebu Comments:   Reason for Stopping:             3.   Follow-up Information     Follow up With Specialties Details Why Contact Info    Raghavendra Hamilton MD Internal Medicine Schedule an appointment as soon as possible for a visit   [address] [facility] [address] [phone]      [facility] EMERGENCY DEPT Emergency Medicine  As needed, If symptoms worsen [address] [id number]  [phone]        _______________________________      Please note that this dictation was completed with [email or web address], the [employer] voice recognition software. Quite often unanticipated grammatical, syntax, homophones, and other interpretive errors are inadvertently transcribed by the computer software. Please disregard these errors. Please excuse any errors that have escaped final proofreading.

## 2021-11-24 LAB — SARS-COV-2, NAA: NOT DETECTED

## 2021-11-26 ENCOUNTER — APPOINTMENT (OUTPATIENT)
Dept: CT IMAGING | Age: 31
End: 2021-11-26
Attending: EMERGENCY MEDICINE
Payer: COMMERCIAL

## 2021-11-26 ENCOUNTER — HOSPITAL ENCOUNTER (EMERGENCY)
Age: 31
Discharge: HOME | End: 2021-11-27
Attending: EMERGENCY MEDICINE
Payer: COMMERCIAL

## 2021-11-26 VITALS
HEIGHT: 65 IN | DIASTOLIC BLOOD PRESSURE: 83 MMHG | SYSTOLIC BLOOD PRESSURE: 137 MMHG | TEMPERATURE: 97.6 F | WEIGHT: 200 LBS | OXYGEN SATURATION: 100 % | BODY MASS INDEX: 33.32 KG/M2 | HEART RATE: 90 BPM | RESPIRATION RATE: 18 BRPM

## 2021-11-26 DIAGNOSIS — K59.00 CONSTIPATION, UNSPECIFIED CONSTIPATION TYPE: Primary | ICD-10-CM

## 2021-11-26 LAB
ALBUMIN SERPL-MCNC: 3.4 G/DL (ref 3.4–5)
ALBUMIN/GLOB SERPL: 1.1 {RATIO} (ref 0.8–1.7)
ALP SERPL-CCNC: 55 U/L (ref 45–117)
ALT SERPL-CCNC: 15 U/L (ref 13–56)
ANION GAP SERPL CALC-SCNC: 7 MMOL/L (ref 3–18)
AST SERPL-CCNC: 5 U/L (ref 10–38)
BASOPHILS # BLD: 0.1 K/UL (ref 0–0.1)
BASOPHILS NFR BLD: 1 % (ref 0–2)
BILIRUB SERPL-MCNC: 0.2 MG/DL (ref 0.2–1)
BUN SERPL-MCNC: 18 MG/DL (ref 7–18)
BUN/CREAT SERPL: 20 (ref 12–20)
CALCIUM SERPL-MCNC: 9.2 MG/DL (ref 8.5–10.1)
CHLORIDE SERPL-SCNC: 108 MMOL/L (ref 100–111)
CO2 SERPL-SCNC: 27 MMOL/L (ref 21–32)
CREAT SERPL-MCNC: 0.88 MG/DL (ref 0.6–1.3)
DIFFERENTIAL METHOD BLD: ABNORMAL
EOSINOPHIL # BLD: 0.6 K/UL (ref 0–0.4)
EOSINOPHIL NFR BLD: 4 % (ref 0–5)
ERYTHROCYTE [DISTWIDTH] IN BLOOD BY AUTOMATED COUNT: 12.6 % (ref 11.6–14.5)
GLOBULIN SER CALC-MCNC: 3 G/DL (ref 2–4)
GLUCOSE SERPL-MCNC: 67 MG/DL (ref 74–99)
HCG SERPL QL: NEGATIVE
HCT VFR BLD AUTO: 47.7 % (ref 35–45)
HGB BLD-MCNC: 16.3 G/DL (ref 12–16)
IMM GRANULOCYTES # BLD AUTO: 0.1 K/UL (ref 0–0.04)
IMM GRANULOCYTES NFR BLD AUTO: 1 % (ref 0–0.5)
LYMPHOCYTES # BLD: 4.9 K/UL (ref 0.9–3.6)
LYMPHOCYTES NFR BLD: 33 % (ref 21–52)
MCH RBC QN AUTO: 31 PG (ref 24–34)
MCHC RBC AUTO-ENTMCNC: 34.2 G/DL (ref 31–37)
MCV RBC AUTO: 90.9 FL (ref 78–100)
MONOCYTES # BLD: 1 K/UL (ref 0.05–1.2)
MONOCYTES NFR BLD: 6 % (ref 3–10)
NEUTS SEG # BLD: 8.2 K/UL (ref 1.8–8)
NEUTS SEG NFR BLD: 55 % (ref 40–73)
NRBC # BLD: 0 K/UL (ref 0–0.01)
NRBC BLD-RTO: 0 PER 100 WBC
PLATELET # BLD AUTO: 340 K/UL (ref 135–420)
PMV BLD AUTO: 9.3 FL (ref 9.2–11.8)
POTASSIUM SERPL-SCNC: 3.8 MMOL/L (ref 3.5–5.5)
PROT SERPL-MCNC: 6.4 G/DL (ref 6.4–8.2)
RBC # BLD AUTO: 5.25 M/UL (ref 4.2–5.3)
SODIUM SERPL-SCNC: 142 MMOL/L (ref 136–145)
WBC # BLD AUTO: 14.8 K/UL (ref 4.6–13.2)

## 2021-11-26 PROCEDURE — 74011000636 HC RX REV CODE- 636: Performed by: EMERGENCY MEDICINE

## 2021-11-26 PROCEDURE — 99283 EMERGENCY DEPT VISIT LOW MDM: CPT

## 2021-11-26 PROCEDURE — 85025 COMPLETE CBC W/AUTO DIFF WBC: CPT

## 2021-11-26 PROCEDURE — 74177 CT ABD & PELVIS W/CONTRAST: CPT

## 2021-11-26 PROCEDURE — 80053 COMPREHEN METABOLIC PANEL: CPT

## 2021-11-26 PROCEDURE — 84703 CHORIONIC GONADOTROPIN ASSAY: CPT

## 2021-11-26 RX ORDER — POLYETHYLENE GLYCOL 3350 17 G/17G
17 POWDER, FOR SOLUTION ORAL 2 TIMES DAILY
Qty: 289 G | Refills: 0 | Status: SHIPPED | OUTPATIENT
Start: 2021-11-26

## 2021-11-26 RX ORDER — DOCUSATE SODIUM 100 MG/1
100 CAPSULE, LIQUID FILLED ORAL 2 TIMES DAILY
Qty: 60 CAPSULE | Refills: 2 | Status: SHIPPED | OUTPATIENT
Start: 2021-11-26 | End: 2022-02-24

## 2021-11-26 RX ADMIN — IOHEXOL: 240 INJECTION, SOLUTION INTRATHECAL; INTRAVASCULAR; INTRAVENOUS; ORAL at 21:59

## 2021-11-26 RX ADMIN — IOPAMIDOL 100 ML: 612 INJECTION, SOLUTION INTRAVENOUS at 23:00

## 2021-11-27 NOTE — ED TRIAGE NOTES
Patient arrives ambulatory to ED with c/c of some discomfort in her abdomen, patient thinks she may be constipated. She states she has not had a bowel movement since Monday.

## 2021-11-27 NOTE — ED PROVIDER NOTES
EMERGENCY DEPARTMENT HISTORY AND PHYSICAL EXAM    Date: 11/26/2021  Patient Name: Anna Wang    History of Presenting Illness     Chief Complaint   Patient presents with    Constipation         History Provided By: Patient    9:35 PM  Anna Wang is a 27 y.o. female with PMHX of asthma, not vaccinated for COVID-19, prior cholecystectomy who presents to the emergency department C/O abdominal discomfort, bloating, constipation. Patient reports she was seen in this emergency department a few days ago for abdominal pain and diarrhea and was told there was abnormality on her CT scan. She reports since she was been home she does not feel the abdominal pain as much but feels full and distended and has not been able to have a bowel movement or pass gas. She denies any fever, nausea, vomiting, chest pain, shortness of breath, urinary complaints, vaginal bleeding or discharge. No clear relieving or exacerbating factors identified. PCP: Sonny Finnegan MD    Current Outpatient Medications   Medication Sig Dispense Refill    docusate sodium (Colace) 100 mg capsule Take 1 Capsule by mouth two (2) times a day for 90 days. 60 Capsule 2    polyethylene glycol (Miralax) 17 gram/dose powder Take 17 g by mouth two (2) times a day. 1 tablespoon with 8 oz of water daily 289 g 0    budesonide-formoteroL (Symbicort) 160-4.5 mcg/actuation HFAA Take 2 Puffs by inhalation two (2) times a day. 1 Each 0    ipratropium-albuteroL (Combivent Respimat)  mcg/actuation inhaler Take 1 Puff by inhalation every six (6) hours as needed for Wheezing. 1 Each 0    albuterol (PROVENTIL HFA, VENTOLIN HFA, PROAIR HFA) 90 mcg/actuation inhaler Take 2 Puffs by inhalation every four (4) hours as needed for Wheezing.  1 Inhaler 1       Past History       Past Medical History:  Past Medical History:   Diagnosis Date    Asthma     Ill-defined condition     ovarian cyst    Neurological disorder     migraines       Past Surgical History:  Past Surgical History:   Procedure Laterality Date    HX GYN       x 2, btl       Family History:  History reviewed. No pertinent family history. Social History:  Social History     Tobacco Use    Smoking status: Former Smoker     Packs/day: 0.25    Smokeless tobacco: Never Used   Substance Use Topics    Alcohol use: Yes     Comment: rarely    Drug use: Never       Allergies: Allergies   Allergen Reactions    Latex Rash    Ativan [Lorazepam] Other (comments)     Aggitation    Phenergan [Promethazine] Rash    Zoloft [Sertraline] Rash         Review of Systems   Review of Systems   Constitutional: Negative for fever. Respiratory: Negative for shortness of breath. Cardiovascular: Negative for chest pain. Gastrointestinal: Positive for abdominal distention and constipation. Negative for nausea and vomiting. Genitourinary: Negative for difficulty urinating. All other systems reviewed and are negative.         Physical Exam     Vitals:    21 2130   BP: 137/83   Pulse: 90   Resp: 18   Temp: 97.6 °F (36.4 °C)   SpO2: 100%   Weight: 90.7 kg (200 lb)   Height: 5' 5\" (1.651 m)     Physical Exam    Nursing notes and vital signs reviewed    Constitutional: Non toxic appearing, moderate distress  Head: Normocephalic, Atraumatic  Eyes: EOMI  Neck: Supple  Cardiovascular: Regular rate and rhythm, no murmurs, rubs, or gallops  Chest: Normal work of breathing and chest excursion bilaterally  Lungs: Clear to ausculation bilaterally  Abdomen: Soft, diffusely tender without rebound or guarding, non distended, normoactive bowel sounds  Back: No evidence of trauma or deformity  Extremities: No evidence of trauma or deformity  Skin: Warm and dry, normal cap refill  Neuro: Alert and appropriate  Psychiatric: Normal mood and affect      Diagnostic Study Results     Labs -     Recent Results (from the past 12 hour(s))   CBC WITH AUTOMATED DIFF    Collection Time: 21 10:00 PM   Result Value Ref Range    WBC 14.8 (H) 4.6 - 13.2 K/uL    RBC 5.25 4.20 - 5.30 M/uL    HGB 16.3 (H) 12.0 - 16.0 g/dL    HCT 47.7 (H) 35.0 - 45.0 %    MCV 90.9 78.0 - 100.0 FL    MCH 31.0 24.0 - 34.0 PG    MCHC 34.2 31.0 - 37.0 g/dL    RDW 12.6 11.6 - 14.5 %    PLATELET 617 040 - 406 K/uL    MPV 9.3 9.2 - 11.8 FL    NRBC 0.0 0  WBC    ABSOLUTE NRBC 0.00 0.00 - 0.01 K/uL    NEUTROPHILS 55 40 - 73 %    LYMPHOCYTES 33 21 - 52 %    MONOCYTES 6 3 - 10 %    EOSINOPHILS 4 0 - 5 %    BASOPHILS 1 0 - 2 %    IMMATURE GRANULOCYTES 1 (H) 0.0 - 0.5 %    ABS. NEUTROPHILS 8.2 (H) 1.8 - 8.0 K/UL    ABS. LYMPHOCYTES 4.9 (H) 0.9 - 3.6 K/UL    ABS. MONOCYTES 1.0 0.05 - 1.2 K/UL    ABS. EOSINOPHILS 0.6 (H) 0.0 - 0.4 K/UL    ABS. BASOPHILS 0.1 0.0 - 0.1 K/UL    ABS. IMM. GRANS. 0.1 (H) 0.00 - 0.04 K/UL    DF AUTOMATED     METABOLIC PANEL, COMPREHENSIVE    Collection Time: 11/26/21 10:00 PM   Result Value Ref Range    Sodium 142 136 - 145 mmol/L    Potassium 3.8 3.5 - 5.5 mmol/L    Chloride 108 100 - 111 mmol/L    CO2 27 21 - 32 mmol/L    Anion gap 7 3.0 - 18 mmol/L    Glucose 67 (L) 74 - 99 mg/dL    BUN 18 7.0 - 18 MG/DL    Creatinine 0.88 0.6 - 1.3 MG/DL    BUN/Creatinine ratio 20 12 - 20      GFR est AA >60 >60 ml/min/1.73m2    GFR est non-AA >60 >60 ml/min/1.73m2    Calcium 9.2 8.5 - 10.1 MG/DL    Bilirubin, total 0.2 0.2 - 1.0 MG/DL    ALT (SGPT) 15 13 - 56 U/L    AST (SGOT) 5 (L) 10 - 38 U/L    Alk. phosphatase 55 45 - 117 U/L    Protein, total 6.4 6.4 - 8.2 g/dL    Albumin 3.4 3.4 - 5.0 g/dL    Globulin 3.0 2.0 - 4.0 g/dL    A-G Ratio 1.1 0.8 - 1.7     HCG QL SERUM    Collection Time: 11/26/21 10:00 PM   Result Value Ref Range    HCG, Ql. Negative NEG         Radiologic Studies -   CT ABD PELV W CONT   Final Result      1. No acute abnormality in the abdomen or pelvis. 2.  No evidence of bowel obstruction. Normal appendix.                                 CT Results  (Last 48 hours)               11/26/21 2310  CT ABD PELV W CONT Final result    Impression:      1. No acute abnormality in the abdomen or pelvis. 2.  No evidence of bowel obstruction. Normal appendix. Narrative:  EXAM: CT ABD PELV W CONT       CLINICAL INDICATION/HISTORY: Abdominal pain. Patient reports abdominal   discomfort, possible constipation. No bowel movement since Monday. COMPARISON: CT 11/23/2021       TECHNIQUE:   CT of the abdomen and pelvis following intravenous contrast   administration. Coronal and sagittal reformats were generated and reviewed. One or more dose reduction techniques were used on this CT: automated exposure   control, adjustment of the mAs and/or kVp according to patient size, and   iterative reconstruction techniques. The specific techniques used on this CT   exam have been documented in the patient's electronic medical record. Digital   Imaging and Communications in Medicine (DICOM) format image data are available   to nonaffiliated external healthcare facilities or entities on a secure, media   free, reciprocally searchable basis with patient authorization for at least a   12-month period after this study. _______________       FINDINGS:       LOWER THORAX: No global cardiomegaly or pericardial effusion. Linear opacities   in the ventral right middle lobe and lingula are new since prior study, likely   discoid atelectasis. No pleural effusion. LIVER: No enhancing hepatic mass. The portal veins are patent. BILIARY: Prior cholecystectomy. No biliary dilation. SPLEEN: Normal.       PANCREAS: Normal.       ADRENALS: Normal.       KIDNEYS: Normal.       GI TRACT: Normal caliber small and large bowel loops. No morphology of bowel   obstruction. No bowel wall thickening. Normal appendix. BLADDER: Normal.        PELVIC ORGANS: Lobular structure along the dorsal uterus (sagittal image 63),   similar to prior study, likely fibroid.  Fluid density 2.4 cm right adnexal structure (axial image 95), similar to prior study, likely ovarian cysts. VASCULATURE: No arterial aneurysm. LYMPH NODES: No mesenteric or retroperitoneal lymphadenopathy. OTHER: No free intraperitoneal air. No ascites. OSSEOUS STRUCTURES: No acute osseous abnormality. _______________               CXR Results  (Last 48 hours)    None          Medications given in the ED-  Medications   iohexol (OMNIPAQUE) ORAL mixture ( Oral Given 11/26/21 8449)   iopamidoL (ISOVUE 300) 61 % contrast injection  mL (100 mL IntraVENous Given 11/26/21 2300)         Medical Decision Making   I am the first provider for this patient. I reviewed the vital signs, available nursing notes, past medical history, past surgical history, family history and social history. Vital Signs-Reviewed the patient's vital signs. Pulse Oximetry Analysis - 100% on room air, not hypoxic     Records Reviewed: Nursing Notes and Old Medical Records    Provider Notes (Medical Decision Making): Danita Miner is a 27 y.o. female presenting for constipation. Patient was seen here a few days ago and had a CT concerning for intussusception at that time. That CT was with IV contrast only so CT was reperformed today with oral contrast to better assess for intussusception or obstruction. CT without acute process today. Patient tolerating p.o. without difficulty. Labs and vital signs benign. Plan for discharge with constipation management, primary care follow-up, return precautions. Patient understands and agrees this plan. Procedures:  Procedures    ED Course:   11:58 PM  Updated patient on all results and plan. All questions answered. Diagnosis and Disposition     Critical Care: None    DISCHARGE NOTE:    Patricia Carrasquillo's  results have been reviewed with her. She has been counseled regarding her diagnosis, treatment, and plan.   She verbally conveys understanding and agreement of the signs, symptoms, diagnosis, treatment and prognosis and additionally agrees to follow up as discussed. She also agrees with the care-plan and conveys that all of her questions have been answered. I have also provided discharge instructions for her that include: educational information regarding their diagnosis and treatment, and list of reasons why they would want to return to the ED prior to their follow-up appointment, should her condition change. She has been provided with education for proper emergency department utilization. CLINICAL IMPRESSION:    1. Constipation, unspecified constipation type        PLAN:  1. D/C Home  2. Current Discharge Medication List      START taking these medications    Details   docusate sodium (Colace) 100 mg capsule Take 1 Capsule by mouth two (2) times a day for 90 days. Qty: 60 Capsule, Refills: 2  Start date: 11/26/2021, End date: 2/24/2022      polyethylene glycol (Miralax) 17 gram/dose powder Take 17 g by mouth two (2) times a day. 1 tablespoon with 8 oz of water daily  Qty: 289 g, Refills: 0  Start date: 11/26/2021           3. Follow-up Information     Follow up With Specialties Details Why Contact Info    Ирина Saravia MD Internal Medicine Schedule an appointment as soon as possible for a visit   [phone]  [phone]      [facility] EMERGENCY DEPT Emergency Medicine  If symptoms worsen [address] [phone]  [phone]        _______________________________      Please note that this dictation was completed with [employer], the computer voice recognition software. Quite often unanticipated grammatical, syntax, homophones, and other interpretive errors are inadvertently transcribed by the computer software. Please disregard these errors. Please excuse any errors that have escaped final proofreading.

## 2021-12-31 ENCOUNTER — HOSPITAL ENCOUNTER (EMERGENCY)
Age: 31
Discharge: HOME | End: 2021-12-31
Attending: EMERGENCY MEDICINE
Payer: COMMERCIAL

## 2021-12-31 ENCOUNTER — APPOINTMENT (OUTPATIENT)
Dept: CT IMAGING | Age: 31
End: 2021-12-31
Attending: EMERGENCY MEDICINE
Payer: COMMERCIAL

## 2021-12-31 ENCOUNTER — APPOINTMENT (OUTPATIENT)
Dept: GENERAL RADIOLOGY | Age: 31
End: 2021-12-31
Attending: EMERGENCY MEDICINE
Payer: COMMERCIAL

## 2021-12-31 VITALS
RESPIRATION RATE: 16 BRPM | HEIGHT: 65 IN | SYSTOLIC BLOOD PRESSURE: 121 MMHG | OXYGEN SATURATION: 97 % | DIASTOLIC BLOOD PRESSURE: 82 MMHG | WEIGHT: 200 LBS | HEART RATE: 81 BPM | TEMPERATURE: 97.6 F | BODY MASS INDEX: 33.32 KG/M2

## 2021-12-31 DIAGNOSIS — E86.0 DEHYDRATION: ICD-10-CM

## 2021-12-31 DIAGNOSIS — U07.1 COVID-19: Primary | ICD-10-CM

## 2021-12-31 LAB
ALBUMIN SERPL-MCNC: 4.1 G/DL (ref 3.4–5)
ALBUMIN/GLOB SERPL: 1.3 {RATIO} (ref 0.8–1.7)
ALP SERPL-CCNC: 61 U/L (ref 45–117)
ALT SERPL-CCNC: 14 U/L (ref 13–56)
ANION GAP SERPL CALC-SCNC: 9 MMOL/L (ref 3–18)
APPEARANCE UR: CLEAR
AST SERPL-CCNC: 10 U/L (ref 10–38)
BASOPHILS # BLD: 0 K/UL (ref 0–0.1)
BASOPHILS NFR BLD: 1 % (ref 0–2)
BILIRUB SERPL-MCNC: 0.4 MG/DL (ref 0.2–1)
BILIRUB UR QL: NEGATIVE
BUN SERPL-MCNC: 28 MG/DL (ref 7–18)
BUN/CREAT SERPL: 29 (ref 12–20)
CALCIUM SERPL-MCNC: 9.3 MG/DL (ref 8.5–10.1)
CHLORIDE SERPL-SCNC: 107 MMOL/L (ref 100–111)
CO2 SERPL-SCNC: 19 MMOL/L (ref 21–32)
COLOR UR: YELLOW
CREAT SERPL-MCNC: 0.96 MG/DL (ref 0.6–1.3)
DIFFERENTIAL METHOD BLD: ABNORMAL
EOSINOPHIL # BLD: 0 K/UL (ref 0–0.4)
EOSINOPHIL NFR BLD: 0 % (ref 0–5)
ERYTHROCYTE [DISTWIDTH] IN BLOOD BY AUTOMATED COUNT: 12.2 % (ref 11.6–14.5)
GLOBULIN SER CALC-MCNC: 3.1 G/DL (ref 2–4)
GLUCOSE SERPL-MCNC: 91 MG/DL (ref 74–99)
GLUCOSE UR STRIP.AUTO-MCNC: NEGATIVE MG/DL
HCG SERPL QL: NEGATIVE
HCT VFR BLD AUTO: 51.9 % (ref 35–45)
HGB BLD-MCNC: 18.2 G/DL (ref 12–16)
HGB UR QL STRIP: NEGATIVE
IMM GRANULOCYTES # BLD AUTO: 0 K/UL (ref 0–0.04)
IMM GRANULOCYTES NFR BLD AUTO: 0 % (ref 0–0.5)
KETONES UR QL STRIP.AUTO: 15 MG/DL
LEUKOCYTE ESTERASE UR QL STRIP.AUTO: NEGATIVE
LIPASE SERPL-CCNC: 43 U/L (ref 73–393)
LYMPHOCYTES # BLD: 1.9 K/UL (ref 0.9–3.6)
LYMPHOCYTES NFR BLD: 30 % (ref 21–52)
MCH RBC QN AUTO: 31 PG (ref 24–34)
MCHC RBC AUTO-ENTMCNC: 35.1 G/DL (ref 31–37)
MCV RBC AUTO: 88.4 FL (ref 78–100)
MONOCYTES # BLD: 1 K/UL (ref 0.05–1.2)
MONOCYTES NFR BLD: 16 % (ref 3–10)
NEUTS SEG # BLD: 3.4 K/UL (ref 1.8–8)
NEUTS SEG NFR BLD: 53 % (ref 40–73)
NITRITE UR QL STRIP.AUTO: NEGATIVE
NRBC # BLD: 0 K/UL (ref 0–0.01)
NRBC BLD-RTO: 0 PER 100 WBC
PH UR STRIP: 5 [PH] (ref 5–8)
PLATELET # BLD AUTO: 271 K/UL (ref 135–420)
PMV BLD AUTO: 10.3 FL (ref 9.2–11.8)
POTASSIUM SERPL-SCNC: 4.2 MMOL/L (ref 3.5–5.5)
PROT SERPL-MCNC: 7.2 G/DL (ref 6.4–8.2)
PROT UR STRIP-MCNC: NEGATIVE MG/DL
RBC # BLD AUTO: 5.87 M/UL (ref 4.2–5.3)
SODIUM SERPL-SCNC: 135 MMOL/L (ref 136–145)
SP GR UR REFRACTOMETRY: >1.03 (ref 1–1.03)
TROPONIN-HIGH SENSITIVITY: 4 NG/L (ref 0–54)
UROBILINOGEN UR QL STRIP.AUTO: 1 EU/DL (ref 0.2–1)
WBC # BLD AUTO: 6.3 K/UL (ref 4.6–13.2)

## 2021-12-31 PROCEDURE — 96375 TX/PRO/DX INJ NEW DRUG ADDON: CPT

## 2021-12-31 PROCEDURE — 84484 ASSAY OF TROPONIN QUANT: CPT

## 2021-12-31 PROCEDURE — C9113 INJ PANTOPRAZOLE SODIUM, VIA: HCPCS | Performed by: EMERGENCY MEDICINE

## 2021-12-31 PROCEDURE — 74177 CT ABD & PELVIS W/CONTRAST: CPT

## 2021-12-31 PROCEDURE — 81003 URINALYSIS AUTO W/O SCOPE: CPT

## 2021-12-31 PROCEDURE — 84703 CHORIONIC GONADOTROPIN ASSAY: CPT

## 2021-12-31 PROCEDURE — 85025 COMPLETE CBC W/AUTO DIFF WBC: CPT

## 2021-12-31 PROCEDURE — 80053 COMPREHEN METABOLIC PANEL: CPT

## 2021-12-31 PROCEDURE — 74011000636 HC RX REV CODE- 636: Performed by: EMERGENCY MEDICINE

## 2021-12-31 PROCEDURE — 99284 EMERGENCY DEPT VISIT MOD MDM: CPT

## 2021-12-31 PROCEDURE — 96374 THER/PROPH/DIAG INJ IV PUSH: CPT

## 2021-12-31 PROCEDURE — 74011250636 HC RX REV CODE- 250/636: Performed by: EMERGENCY MEDICINE

## 2021-12-31 PROCEDURE — 83690 ASSAY OF LIPASE: CPT

## 2021-12-31 PROCEDURE — 74011250637 HC RX REV CODE- 250/637: Performed by: EMERGENCY MEDICINE

## 2021-12-31 PROCEDURE — 74011000250 HC RX REV CODE- 250: Performed by: EMERGENCY MEDICINE

## 2021-12-31 PROCEDURE — 96361 HYDRATE IV INFUSION ADD-ON: CPT

## 2021-12-31 RX ORDER — PANTOPRAZOLE SODIUM 40 MG/1
40 TABLET, DELAYED RELEASE ORAL DAILY
Qty: 20 TABLET | Refills: 0 | Status: SHIPPED | OUTPATIENT
Start: 2021-12-31 | End: 2022-01-20

## 2021-12-31 RX ORDER — MORPHINE SULFATE 4 MG/ML
4 INJECTION INTRAVENOUS
Status: COMPLETED | OUTPATIENT
Start: 2021-12-31 | End: 2021-12-31

## 2021-12-31 RX ORDER — PANTOPRAZOLE SODIUM 40 MG/10ML
40 INJECTION, POWDER, LYOPHILIZED, FOR SOLUTION INTRAVENOUS
Status: COMPLETED | OUTPATIENT
Start: 2021-12-31 | End: 2021-12-31

## 2021-12-31 RX ORDER — ONDANSETRON 4 MG/1
4 TABLET, ORALLY DISINTEGRATING ORAL
Qty: 12 TABLET | Refills: 0 | Status: SHIPPED | OUTPATIENT
Start: 2021-12-31

## 2021-12-31 RX ORDER — ONDANSETRON 2 MG/ML
4 INJECTION INTRAMUSCULAR; INTRAVENOUS
Status: COMPLETED | OUTPATIENT
Start: 2021-12-31 | End: 2021-12-31

## 2021-12-31 RX ADMIN — LIDOCAINE HYDROCHLORIDE 40 ML: 20 SOLUTION ORAL; TOPICAL at 11:54

## 2021-12-31 RX ADMIN — IOPAMIDOL 100 ML: 612 INJECTION, SOLUTION INTRAVENOUS at 10:29

## 2021-12-31 RX ADMIN — ONDANSETRON 4 MG: 2 INJECTION INTRAMUSCULAR; INTRAVENOUS at 09:15

## 2021-12-31 RX ADMIN — SODIUM CHLORIDE 1000 ML: 9 INJECTION, SOLUTION INTRAVENOUS at 11:03

## 2021-12-31 RX ADMIN — MORPHINE SULFATE 4 MG: 4 INJECTION INTRAVENOUS at 09:17

## 2021-12-31 RX ADMIN — PANTOPRAZOLE SODIUM 40 MG: 40 INJECTION, POWDER, FOR SOLUTION INTRAVENOUS at 09:16

## 2021-12-31 RX ADMIN — SODIUM CHLORIDE 1000 ML: 9 INJECTION, SOLUTION INTRAVENOUS at 09:12

## 2021-12-31 NOTE — ED PROVIDER NOTES
EMERGENCY DEPARTMENT HISTORY AND PHYSICAL EXAM    Date: 12/31/2021  Patient Name: Juan F Perry    History of Presenting Illness     Chief Complaint   Patient presents with    Positive For Covid-19    Vomiting Blood         History Provided By: Patient    8:52 AM  Juan F Perry is a 27 y.o. female with PMHX of asthma, diagnosed with Covid yesterday, on day 4 of Covid symptoms, not vaccinated who presents to the emergency department C/O fever, shortness of breath, nausea, vomiting, diarrhea, abdominal pain, body aches. Patient reports this morning when she vomited it looked like there was blood in the vomit and this brought her to the emergency department. No clear relieving or exacerbating factors identified. Does not take any blood thinners. Does not smoke. Drinks occasionally. Does note that she has been on a course of steroids for her shortness of breath and Covid symptoms and currently taking prednisone. PCP: Krystle Hubbard MD    Current Outpatient Medications   Medication Sig Dispense Refill    ondansetron (ZOFRAN ODT) 4 mg disintegrating tablet Take 1 Tablet by mouth every eight (8) hours as needed for Nausea or Vomiting. 12 Tablet 0    pantoprazole (Protonix) 40 mg tablet Take 1 Tablet by mouth daily for 20 days. 20 Tablet 0    docusate sodium (Colace) 100 mg capsule Take 1 Capsule by mouth two (2) times a day for 90 days. 60 Capsule 2    polyethylene glycol (Miralax) 17 gram/dose powder Take 17 g by mouth two (2) times a day. 1 tablespoon with 8 oz of water daily 289 g 0    budesonide-formoteroL (Symbicort) 160-4.5 mcg/actuation HFAA Take 2 Puffs by inhalation two (2) times a day. 1 Each 0    ipratropium-albuteroL (Combivent Respimat)  mcg/actuation inhaler Take 1 Puff by inhalation every six (6) hours as needed for Wheezing.  1 Each 0    albuterol (PROVENTIL HFA, VENTOLIN HFA, PROAIR HFA) 90 mcg/actuation inhaler Take 2 Puffs by inhalation every four (4) hours as needed for Wheezing. 1 Inhaler 1       Past History       Past Medical History:  Past Medical History:   Diagnosis Date    Asthma     Ill-defined condition     ovarian cyst    Neurological disorder     migraines       Past Surgical History:  Past Surgical History:   Procedure Laterality Date    HX GYN       x 2, btl       Family History:  History reviewed. No pertinent family history. Social History:  Social History     Tobacco Use    Smoking status: Former Smoker     Packs/day: 0.25    Smokeless tobacco: Never Used   Substance Use Topics    Alcohol use: Not Currently     Comment: rarely    Drug use: Never       Allergies: Allergies   Allergen Reactions    Latex Rash    Ativan [Lorazepam] Other (comments)     Aggitation    Phenergan [Promethazine] Rash    Zoloft [Sertraline] Rash         Review of Systems   Review of Systems   Constitutional: Positive for fever. Respiratory: Positive for shortness of breath. Cardiovascular: Negative for chest pain. Gastrointestinal: Positive for abdominal pain, diarrhea, nausea and vomiting. Musculoskeletal: Positive for myalgias. All other systems reviewed and are negative.         Physical Exam     Vitals:    21 0847   BP: (!) 148/104   Pulse: (!) 101   Resp: 16   Temp: 97.6 °F (36.4 °C)   SpO2: 91%   Weight: 90.7 kg (200 lb)   Height: 5' 5\" (1.651 m)     Physical Exam    Nursing notes and vital signs reviewed    Constitutional: Non toxic appearing, moderate distress  Head: Normocephalic, Atraumatic  Eyes: EOMI  Neck: Supple  Cardiovascular: Tachycardic and regular rhythm, no murmurs, rubs, or gallops  Chest: Normal work of breathing and chest excursion bilaterally  Lungs: Clear to ausculation bilaterally  Abdomen: Soft, diffusely tender with guarding in the epigastrium, non distended, normoactive bowel sounds  Back: No evidence of trauma or deformity  Extremities: No evidence of trauma or deformity, no LE edema  Skin: Warm and dry, normal cap refill  Neuro: Alert and appropriate  Psychiatric: Anxious and tearful mood and affect      Diagnostic Study Results     Labs -     Recent Results (from the past 12 hour(s))   URINALYSIS W/ RFLX MICROSCOPIC    Collection Time: 12/31/21  9:00 AM   Result Value Ref Range    Color YELLOW      Appearance CLEAR      Specific gravity >1.030 (H) 1.005 - 1.030    pH (UA) 5.0 5.0 - 8.0      Protein Negative NEG mg/dL    Glucose Negative NEG mg/dL    Ketone 15 (A) NEG mg/dL    Bilirubin Negative NEG      Blood Negative NEG      Urobilinogen 1.0 0.2 - 1.0 EU/dL    Nitrites Negative NEG      Leukocyte Esterase Negative NEG     CBC WITH AUTOMATED DIFF    Collection Time: 12/31/21  9:05 AM   Result Value Ref Range    WBC 6.3 4.6 - 13.2 K/uL    RBC 5.87 (H) 4.20 - 5.30 M/uL    HGB 18.2 (H) 12.0 - 16.0 g/dL    HCT 51.9 (H) 35.0 - 45.0 %    MCV 88.4 78.0 - 100.0 FL    MCH 31.0 24.0 - 34.0 PG    MCHC 35.1 31.0 - 37.0 g/dL    RDW 12.2 11.6 - 14.5 %    PLATELET 054 511 - 281 K/uL    MPV 10.3 9.2 - 11.8 FL    NRBC 0.0 0  WBC    ABSOLUTE NRBC 0.00 0.00 - 0.01 K/uL    NEUTROPHILS 53 40 - 73 %    LYMPHOCYTES 30 21 - 52 %    MONOCYTES 16 (H) 3 - 10 %    EOSINOPHILS 0 0 - 5 %    BASOPHILS 1 0 - 2 %    IMMATURE GRANULOCYTES 0 0.0 - 0.5 %    ABS. NEUTROPHILS 3.4 1.8 - 8.0 K/UL    ABS. LYMPHOCYTES 1.9 0.9 - 3.6 K/UL    ABS. MONOCYTES 1.0 0.05 - 1.2 K/UL    ABS. EOSINOPHILS 0.0 0.0 - 0.4 K/UL    ABS. BASOPHILS 0.0 0.0 - 0.1 K/UL    ABS. IMM.  GRANS. 0.0 0.00 - 0.04 K/UL    DF AUTOMATED     METABOLIC PANEL, COMPREHENSIVE    Collection Time: 12/31/21  9:05 AM   Result Value Ref Range    Sodium 135 (L) 136 - 145 mmol/L    Potassium 4.2 3.5 - 5.5 mmol/L    Chloride 107 100 - 111 mmol/L    CO2 19 (L) 21 - 32 mmol/L    Anion gap 9 3.0 - 18 mmol/L    Glucose 91 74 - 99 mg/dL    BUN 28 (H) 7.0 - 18 MG/DL    Creatinine 0.96 0.6 - 1.3 MG/DL    BUN/Creatinine ratio 29 (H) 12 - 20      GFR est AA >60 >60 ml/min/1.73m2    GFR est non-AA >60 >60 ml/min/1.73m2    Calcium 9.3 8.5 - 10.1 MG/DL    Bilirubin, total 0.4 0.2 - 1.0 MG/DL    ALT (SGPT) 14 13 - 56 U/L    AST (SGOT) 10 10 - 38 U/L    Alk. phosphatase 61 45 - 117 U/L    Protein, total 7.2 6.4 - 8.2 g/dL    Albumin 4.1 3.4 - 5.0 g/dL    Globulin 3.1 2.0 - 4.0 g/dL    A-G Ratio 1.3 0.8 - 1.7     HCG QL SERUM    Collection Time: 12/31/21  9:05 AM   Result Value Ref Range    HCG, Ql. Negative NEG     LIPASE    Collection Time: 12/31/21  9:05 AM   Result Value Ref Range    Lipase 43 (L) 73 - 393 U/L   TROPONIN-HIGH SENSITIVITY    Collection Time: 12/31/21  9:05 AM   Result Value Ref Range    Troponin-High Sensitivity 4 0 - 54 ng/L       Radiologic Studies -   CT ABD PELV W CONT   Final Result      No evidence of an acute abdominal or pelvic process seen. Multiple incidental findings as described above. CT Results  (Last 48 hours)               12/31/21 1035  CT ABD PELV W CONT Final result    Impression:      No evidence of an acute abdominal or pelvic process seen. Multiple incidental findings as described above. Narrative:  EXAM: CT of the abdomen and pelvis       INDICATION: Hematemesis, Covid positive       COMPARISON: CT dated 11/26/2021       TECHNIQUE: Axial CT imaging of the abdomen and pelvis was performed after   intravenous as well as oral contrast administration. Multiplanar reformats were   generated. One or more dose reduction techniques were used on this CT: automated exposure   control, adjustment of the mAs and/or kVp according to patient's size, and   iterative reconstruction techniques. The specific techniques utilized on this CT   exam have been documented in the patient's electronic medical record.    Digital Imaging and Communications in Medicine (DICOM) format image data are   available to nonaffiliated external healthcare facilities or entities on a   secure, media free, reciprocally searchable basis with patient authorization for   at least a 12-month period after this study. _______________       FINDINGS:       LOWER CHEST: Previously noted atelectasis right middle lobe resolved. Focal   atelectasis or infiltrate seen in the lingula. LIVER, BILIARY: Liver is normal. No biliary dilation. Cholecystectomy. PANCREAS: Normal.       SPLEEN: Normal.       ADRENALS: Normal.       KIDNEYS/URETERS/BLADDER: There is no hydronephrosis or nephrolithiasis on either   side. No evidence of any bladder mass or wall thickening identified. PELVIC ORGANS: Uterus unremarkable likely with a fibroid. Ovarian follicles   present bilaterally. VASCULATURE: Unremarkable       LYMPH NODES: No enlarged lymph nodes. GASTROINTESTINAL TRACT: No bowel dilation or wall thickening. What is likely the   appendix appears normal.       BONES: No acute or aggressive osseous abnormalities identified. OTHER: None.       _______________               CXR Results  (Last 48 hours)    None          Medications given in the ED-  Medications   pantoprazole (PROTONIX) injection 40 mg (40 mg IntraVENous Given 12/31/21 0916)   morphine injection 4 mg (4 mg IntraVENous Given 12/31/21 0917)   ondansetron (ZOFRAN) injection 4 mg (4 mg IntraVENous Given 12/31/21 0915)   sodium chloride 0.9 % bolus infusion 1,000 mL (0 mL IntraVENous IV Completed 12/31/21 1234)   iopamidoL (ISOVUE 300) 61 % contrast injection 100 mL (100 mL IntraVENous Given 12/31/21 1029)   sodium chloride 0.9 % bolus infusion 1,000 mL (0 mL IntraVENous IV Completed 12/31/21 1234)   mylanta/viscous lidocaine (GI COCKTAIL) (40 mL Oral Given 12/31/21 1154)         Medical Decision Making   I am the first provider for this patient. I reviewed the vital signs, available nursing notes, past medical history, past surgical history, family history and social history. Vital Signs-Reviewed the patient's vital signs.     Pulse Oximetry Analysis - 91% on room air, borderline    Records Reviewed: Nursing Notes    Provider Notes (Medical Decision Making): David Stroud is a 27 y.o. female presenting in the setting of known COVID-19 infection. Nausea, vomiting, abdominal pain, reported hematemesis today. Patient has not had any hematemesis here in the emergency department. Labs with dehydration but otherwise benign. CT without acute process. Patient tolerating p.o. at this time. Will treat symptomatically with ODT Zofran and PPI and discharged with plan for continued isolation, primary care follow-up, return precautions. Patient understands and agrees this plan. Procedures:  Procedures    ED Course:   9:06 AM  Refusing chest x-ray. Reports she had one yesterday and her breathing feels normal to her. Explained my concern about her borderline pulse ox the patient still complaints. 1:04 PM  Updated patient on all results and plan. All questions answered. Diagnosis and Disposition     Critical Care: None    DISCHARGE NOTE:    Patricia Carrasquillo's  results have been reviewed with her. She has been counseled regarding her diagnosis, treatment, and plan. She verbally conveys understanding and agreement of the signs, symptoms, diagnosis, treatment and prognosis and additionally agrees to follow up as discussed. She also agrees with the care-plan and conveys that all of her questions have been answered. I have also provided discharge instructions for her that include: educational information regarding their diagnosis and treatment, and list of reasons why they would want to return to the ED prior to their follow-up appointment, should her condition change. She has been provided with education for proper emergency department utilization. CLINICAL IMPRESSION:    1. COVID-19    2. Dehydration        PLAN:  1. D/C Home  2.    Current Discharge Medication List      START taking these medications    Details   ondansetron (ZOFRAN ODT) 4 mg disintegrating tablet Take 1 Tablet by mouth every eight (8) hours as needed for Nausea or Vomiting. Qty: 12 Tablet, Refills: 0  Start date: 12/31/2021      pantoprazole (Protonix) 40 mg tablet Take 1 Tablet by mouth daily for 20 days. Qty: 20 Tablet, Refills: 0  Start date: 12/31/2021, End date: 1/20/2022           3. Follow-up Information     Follow up With Specialties Details Why Contact Info    Sophie White MD Internal Medicine Schedule an appointment as soon as possible for a visit   [address] [phone]  [phone]      [facility] EMERGENCY DEPT Emergency Medicine  If symptoms worsen [address] [place] [phone]  [phone]        _______________________________      Please note that this dictation was completed with [employer], the computer voice recognition software. Quite often unanticipated grammatical, syntax, homophones, and other interpretive errors are inadvertently transcribed by the computer software. Please disregard these errors. Please excuse any errors that have escaped final proofreading.

## 2021-12-31 NOTE — Clinical Note
[facility] [place]  [facility]  [address]  [facility] [address]  [phone]    Work/School Note    Date: 12/31/2021     To Whom It May concern:    Patricia Carrasquillo was evaluated by the following provider(s):  Attending Provider: Sebastián Mclaughlin [address] [place] [address] virus is suspected. Per the CDC guidelines we recommend home isolation until the following conditions are all met:    1. At least five days have passed since symptoms first appeared and/or had a close exposure,   2. After home isolation for five days, wearing a mask around others for the next five days,  3. At least 24 have passed since last fever without the use of fever-reducing medications and  4.  Symptoms (eg cough, shortness of breath) have improved      Sincerely,          Teddy Foley MD

## 2022-01-05 ENCOUNTER — HOSPITAL ENCOUNTER (EMERGENCY)
Age: 32
Discharge: HOME | End: 2022-01-05
Attending: EMERGENCY MEDICINE
Payer: COMMERCIAL

## 2022-01-05 ENCOUNTER — APPOINTMENT (OUTPATIENT)
Dept: GENERAL RADIOLOGY | Age: 32
End: 2022-01-05
Attending: PHYSICIAN ASSISTANT
Payer: COMMERCIAL

## 2022-01-05 VITALS
SYSTOLIC BLOOD PRESSURE: 119 MMHG | TEMPERATURE: 97.4 F | WEIGHT: 200 LBS | HEIGHT: 65 IN | DIASTOLIC BLOOD PRESSURE: 70 MMHG | OXYGEN SATURATION: 96 % | RESPIRATION RATE: 22 BRPM | HEART RATE: 86 BPM | BODY MASS INDEX: 33.32 KG/M2

## 2022-01-05 DIAGNOSIS — J45.41 MODERATE PERSISTENT ASTHMA WITH ACUTE EXACERBATION: Primary | ICD-10-CM

## 2022-01-05 DIAGNOSIS — U07.1 COVID-19 VIRUS INFECTION: ICD-10-CM

## 2022-01-05 LAB
ANION GAP SERPL CALC-SCNC: 9 MMOL/L (ref 3–18)
BASOPHILS # BLD: 0.1 K/UL (ref 0–0.1)
BASOPHILS NFR BLD: 1 % (ref 0–2)
BUN SERPL-MCNC: 10 MG/DL (ref 7–18)
BUN/CREAT SERPL: 14 (ref 12–20)
CALCIUM SERPL-MCNC: 8.8 MG/DL (ref 8.5–10.1)
CHLORIDE SERPL-SCNC: 109 MMOL/L (ref 100–111)
CO2 SERPL-SCNC: 22 MMOL/L (ref 21–32)
CREAT SERPL-MCNC: 0.7 MG/DL (ref 0.6–1.3)
DIFFERENTIAL METHOD BLD: ABNORMAL
EOSINOPHIL # BLD: 0.2 K/UL (ref 0–0.4)
EOSINOPHIL NFR BLD: 3 % (ref 0–5)
ERYTHROCYTE [DISTWIDTH] IN BLOOD BY AUTOMATED COUNT: 12.3 % (ref 11.6–14.5)
GLUCOSE SERPL-MCNC: 82 MG/DL (ref 74–99)
HCT VFR BLD AUTO: 50.7 % (ref 35–45)
HGB BLD-MCNC: 18.5 G/DL (ref 12–16)
IMM GRANULOCYTES # BLD AUTO: 0 K/UL (ref 0–0.04)
IMM GRANULOCYTES NFR BLD AUTO: 0 % (ref 0–0.5)
LYMPHOCYTES # BLD: 2.5 K/UL (ref 0.9–3.6)
LYMPHOCYTES NFR BLD: 51 % (ref 21–52)
MCH RBC QN AUTO: 31.9 PG (ref 24–34)
MCHC RBC AUTO-ENTMCNC: 36.5 G/DL (ref 31–37)
MCV RBC AUTO: 87.4 FL (ref 78–100)
MONOCYTES # BLD: 0.6 K/UL (ref 0.05–1.2)
MONOCYTES NFR BLD: 11 % (ref 3–10)
NEUTS SEG # BLD: 1.8 K/UL (ref 1.8–8)
NEUTS SEG NFR BLD: 34 % (ref 40–73)
NRBC # BLD: 0 K/UL (ref 0–0.01)
NRBC BLD-RTO: 0 PER 100 WBC
PLATELET # BLD AUTO: 194 K/UL (ref 135–420)
PMV BLD AUTO: 10.7 FL (ref 9.2–11.8)
POTASSIUM SERPL-SCNC: 3.8 MMOL/L (ref 3.5–5.5)
RBC # BLD AUTO: 5.8 M/UL (ref 4.2–5.3)
RBC MORPH BLD: ABNORMAL
SODIUM SERPL-SCNC: 140 MMOL/L (ref 136–145)
WBC # BLD AUTO: 5.2 K/UL (ref 4.6–13.2)

## 2022-01-05 PROCEDURE — 74011000250 HC RX REV CODE- 250: Performed by: PHYSICIAN ASSISTANT

## 2022-01-05 PROCEDURE — 99281 EMR DPT VST MAYX REQ PHY/QHP: CPT

## 2022-01-05 PROCEDURE — 94640 AIRWAY INHALATION TREATMENT: CPT

## 2022-01-05 PROCEDURE — 80048 BASIC METABOLIC PNL TOTAL CA: CPT

## 2022-01-05 PROCEDURE — 71045 X-RAY EXAM CHEST 1 VIEW: CPT

## 2022-01-05 PROCEDURE — 85025 COMPLETE CBC W/AUTO DIFF WBC: CPT

## 2022-01-05 PROCEDURE — 99282 EMERGENCY DEPT VISIT SF MDM: CPT

## 2022-01-05 PROCEDURE — 96374 THER/PROPH/DIAG INJ IV PUSH: CPT

## 2022-01-05 PROCEDURE — 74011250636 HC RX REV CODE- 250/636: Performed by: PHYSICIAN ASSISTANT

## 2022-01-05 RX ORDER — IPRATROPIUM BROMIDE AND ALBUTEROL SULFATE 2.5; .5 MG/3ML; MG/3ML
3 SOLUTION RESPIRATORY (INHALATION)
Status: COMPLETED | OUTPATIENT
Start: 2022-01-05 | End: 2022-01-05

## 2022-01-05 RX ORDER — ALBUTEROL SULFATE 0.83 MG/ML
2.5 SOLUTION RESPIRATORY (INHALATION)
Status: COMPLETED | OUTPATIENT
Start: 2022-01-05 | End: 2022-01-05

## 2022-01-05 RX ORDER — AZITHROMYCIN 250 MG/1
TABLET, FILM COATED ORAL
Qty: 6 TABLET | Refills: 0 | Status: SHIPPED | OUTPATIENT
Start: 2022-01-05 | End: 2022-01-10

## 2022-01-05 RX ADMIN — METHYLPREDNISOLONE SODIUM SUCCINATE 125 MG: 125 INJECTION, POWDER, FOR SOLUTION INTRAMUSCULAR; INTRAVENOUS at 16:34

## 2022-01-05 RX ADMIN — IPRATROPIUM BROMIDE AND ALBUTEROL SULFATE 3 ML: .5; 2.5 SOLUTION RESPIRATORY (INHALATION) at 17:51

## 2022-01-05 RX ADMIN — SODIUM CHLORIDE 1000 ML: 9 INJECTION, SOLUTION INTRAVENOUS at 16:33

## 2022-01-05 RX ADMIN — IPRATROPIUM BROMIDE AND ALBUTEROL SULFATE 3 ML: .5; 2.5 SOLUTION RESPIRATORY (INHALATION) at 16:41

## 2022-01-05 RX ADMIN — ALBUTEROL SULFATE 2.5 MG: 2.5 SOLUTION RESPIRATORY (INHALATION) at 19:47

## 2022-01-05 NOTE — ED PROVIDER NOTES
EMERGENCY DEPARTMENT HISTORY AND PHYSICAL EXAM    Date: 1/5/2022  Patient Name: Nehal Yun    History of Presenting Illness     Chief Complaint   Patient presents with    Shortness of Breath    Positive For Covid-19         History Provided By: Patient    3:46 PM  Nehal Yun is a 27 y.o. female with PMHX of asthma who presents to the emergency department C/O cough, wheezing and shortness of breath. Patient states she developed URI symptoms with nausea vomiting and diarrhea 12 days ago, 4 days later she tested positive for COVID-19 at urgent care clinic. She states most of her symptoms have resolved but she continues to have cough wheezing and shortness of breath. She is on day 3 or 4 of a prednisone taper, compliant with her Symbicort, Combivent and using duo nebs at home. She has only had 1 nebulizer treatment today but states she is giving herself a treatment every 2-4 hours for the past few days. She states she has been monitoring her pulse ox at home, it was 88% last night and 94% just prior to arrival to ED. Patient has never been intubated for her asthma. Patient is not vaccinated against flu or Covid. Pt denies fever, ear pain, chest pain, and any other sxs or complaints. PCP: Gregor Osgood, MD    Current Outpatient Medications   Medication Sig Dispense Refill    azithromycin (Zithromax Z-Mateo) 250 mg tablet Use per pack instructions. 6 Tablet 0    ondansetron (ZOFRAN ODT) 4 mg disintegrating tablet Take 1 Tablet by mouth every eight (8) hours as needed for Nausea or Vomiting. 12 Tablet 0    pantoprazole (Protonix) 40 mg tablet Take 1 Tablet by mouth daily for 20 days. 20 Tablet 0    docusate sodium (Colace) 100 mg capsule Take 1 Capsule by mouth two (2) times a day for 90 days. 60 Capsule 2    polyethylene glycol (Miralax) 17 gram/dose powder Take 17 g by mouth two (2) times a day.  1 tablespoon with 8 oz of water daily 289 g 0    budesonide-formoteroL (Symbicort) 160-4.5 mcg/actuation HFAA Take 2 Puffs by inhalation two (2) times a day. 1 Each 0    ipratropium-albuteroL (Combivent Respimat)  mcg/actuation inhaler Take 1 Puff by inhalation every six (6) hours as needed for Wheezing. 1 Each 0    albuterol (PROVENTIL HFA, VENTOLIN HFA, PROAIR HFA) 90 mcg/actuation inhaler Take 2 Puffs by inhalation every four (4) hours as needed for Wheezing. 1 Inhaler 1       Past History     Past Medical History:  Past Medical History:   Diagnosis Date    Asthma     Ill-defined condition     ovarian cyst    Neurological disorder     migraines       Past Surgical History:  Past Surgical History:   Procedure Laterality Date    HX GYN       x 2, btl       Family History:  No family history on file. Social History:  Social History     Tobacco Use    Smoking status: Former Smoker     Packs/day: 0.25    Smokeless tobacco: Never Used   Substance Use Topics    Alcohol use: Not Currently     Comment: rarely    Drug use: Never       Allergies: Allergies   Allergen Reactions    Latex Rash    Ativan [Lorazepam] Other (comments)     Aggitation    Phenergan [Promethazine] Rash    Zoloft [Sertraline] Rash         Review of Systems   Review of Systems   Constitutional: Negative for fever. HENT: Negative for congestion. Respiratory: Positive for cough, shortness of breath and wheezing. Cardiovascular: Negative for chest pain. Gastrointestinal: Negative for diarrhea and vomiting. All other systems reviewed and are negative. Physical Exam     Vitals:    22 1537 22 1947 22 194   BP: 112/72  119/70   Pulse: (!) 116 (!) 102 86   Resp: 24  22   Temp: 97.4 °F (36.3 °C)     SpO2: 95%  96%   Weight: 90.7 kg (200 lb)     Height: 5' 5\" (1.651 m)       Physical Exam    Vital signs and nursing notes reviewed. CONSTITUTIONAL: Alert. Well-appearing; well-nourished; in no apparent distress. HEAD: Normocephalic; atraumatic. EYES: PERRL; Conjunctiva clear. ENT: External ear normal. Normal nose; no rhinorrhea. Normal pharynx. Moist mucus membranes. NECK: Supple; FROM without difficulty, non-tender; no cervical lymphadenopathy. CV: Normal S1, S2; no murmurs, rubs, or gallops. No chest wall tenderness. RESPIRATORY: Normal chest excursion with respiration; diffuse inspiratory and expiratory wheezes bilaterally. No rhonchi or rales. NEURO: A & O x3. PSYCH:  Mood and affect appropriate. Diagnostic Study Results     Labs -     Recent Results (from the past 12 hour(s))   CBC WITH AUTOMATED DIFF    Collection Time: 01/05/22  4:09 PM   Result Value Ref Range    WBC 5.2 4.6 - 13.2 K/uL    RBC 5.80 (H) 4.20 - 5.30 M/uL    HGB 18.5 (H) 12.0 - 16.0 g/dL    HCT 50.7 (H) 35.0 - 45.0 %    MCV 87.4 78.0 - 100.0 FL    MCH 31.9 24.0 - 34.0 PG    MCHC 36.5 31.0 - 37.0 g/dL    RDW 12.3 11.6 - 14.5 %    PLATELET 644 638 - 611 K/uL    MPV 10.7 9.2 - 11.8 FL    NRBC 0.0 0  WBC    ABSOLUTE NRBC 0.00 0.00 - 0.01 K/uL    NEUTROPHILS 34 (L) 40 - 73 %    LYMPHOCYTES 51 21 - 52 %    MONOCYTES 11 (H) 3 - 10 %    EOSINOPHILS 3 0 - 5 %    BASOPHILS 1 0 - 2 %    IMMATURE GRANULOCYTES 0 0.0 - 0.5 %    ABS. NEUTROPHILS 1.8 1.8 - 8.0 K/UL    ABS. LYMPHOCYTES 2.5 0.9 - 3.6 K/UL    ABS. MONOCYTES 0.6 0.05 - 1.2 K/UL    ABS. EOSINOPHILS 0.2 0.0 - 0.4 K/UL    ABS. BASOPHILS 0.1 0.0 - 0.1 K/UL    ABS. IMM.  GRANS. 0.0 0.00 - 0.04 K/UL    DF AUTOMATED      RBC COMMENTS NORMOCYTIC, NORMOCHROMIC     METABOLIC PANEL, BASIC    Collection Time: 01/05/22  4:09 PM   Result Value Ref Range    Sodium 140 136 - 145 mmol/L    Potassium 3.8 3.5 - 5.5 mmol/L    Chloride 109 100 - 111 mmol/L    CO2 22 21 - 32 mmol/L    Anion gap 9 3.0 - 18 mmol/L    Glucose 82 74 - 99 mg/dL    BUN 10 7.0 - 18 MG/DL    Creatinine 0.70 0.6 - 1.3 MG/DL    BUN/Creatinine ratio 14 12 - 20      GFR est AA >60 >60 ml/min/1.73m2    GFR est non-AA >60 >60 ml/min/1.73m2    Calcium 8.8 8.5 - 10.1 MG/DL Radiologic Studies -   XR CHEST PORT   Final Result      No acute cardiopulmonary abnormality. CT Results  (Last 48 hours)    None        CXR Results  (Last 48 hours)               01/05/22 1634  XR CHEST PORT Final result    Impression:      No acute cardiopulmonary abnormality. Narrative:  EXAM: XR CHEST PORT       CLINICAL INDICATION/HISTORY: cough, wheezing, +COVID   -Additional: None       COMPARISON: 11/23/2021       TECHNIQUE: Portable frontal view of the chest       _______________       FINDINGS:       SUPPORT DEVICES: None. HEART AND MEDIASTINUM: Cardiomediastinal silhouette within normal limits. LUNGS AND PLEURAL SPACES: No dense consolidation, large effusion or   pneumothorax.       _______________                 Medications given in the ED-  Medications   sodium chloride 0.9 % bolus infusion 1,000 mL (0 mL IntraVENous IV Completed 1/5/22 1951)   methylPREDNISolone (PF) (Solu-MEDROL) injection 125 mg (125 mg IntraVENous Given 1/5/22 1634)   albuterol-ipratropium (DUO-NEB) 2.5 MG-0.5 MG/3 ML (3 mL Nebulization Given 1/5/22 1641)   albuterol-ipratropium (DUO-NEB) 2.5 MG-0.5 MG/3 ML (3 mL Nebulization Given 1/5/22 1751)   albuterol (PROVENTIL VENTOLIN) nebulizer solution 2.5 mg (2.5 mg Nebulization Given 1/5/22 1947)         Medical Decision Making   I am the first provider for this patient. I reviewed the vital signs, available nursing notes, past medical history, past surgical history, family history and social history. Vital Signs-Reviewed the patient's vital signs. Pulse Oximetry Analysis - 95% on RA       Records Reviewed: Nursing Notes      Procedures:  Procedures    ED Course:  3:46 PM   Initial assessment performed. The patients presenting problems have been discussed, and they are in agreement with the care plan formulated and outlined with them. I have encouraged them to ask questions as they arise throughout their visit.          8:10 PM progress note  Patient has completed 3 nebulizer treatments in addition to IV steroids. Her labs are grossly unremarkable, chest x-ray shows no acute abnormality. Patient is 12 days status post onset of URI symptoms with positive COVID-19 test.  O2 sats have been 95% on room air or better, wheezes have improved but not completely resolved while in the ED. Recommend that she complete her steroid taper that was recently prescribed, continue nebulizer treatments every 4-6 hours as needed in addition to her Combivent and Singulair. We will also add Zithromax given her underlying lung disease and recommend she return to ED if symptoms worsen or pulse ox  93% or less. Diagnosis and Disposition       DISCHARGE NOTE:    Patricia Carrasquillo's  results have been reviewed with her. She has been counseled regarding her diagnosis, treatment, and plan. She verbally conveys understanding and agreement of the signs, symptoms, diagnosis, treatment and prognosis and additionally agrees to follow up as discussed. She also agrees with the care-plan and conveys that all of her questions have been answered. I have also provided discharge instructions for her that include: educational information regarding their diagnosis and treatment, and list of reasons why they would want to return to the ED prior to their follow-up appointment, should her condition change. She has been provided with education for proper emergency department utilization. CLINICAL IMPRESSION:    1. Moderate persistent asthma with acute exacerbation    2. COVID-19 virus infection        PLAN:  1. D/C Home  2. Current Discharge Medication List      START taking these medications    Details   azithromycin (Zithromax Z-Mateo) 250 mg tablet Use per pack instructions. Qty: 6 Tablet, Refills: 0  Start date: 1/5/2022, End date: 1/10/2022           3.    Follow-up Information     Follow up With Specialties Details Why Contact Info    Brandyn Gallagher MD Internal Medicine Schedule an appointment as soon as possible for a visit   [phone]  [phone]      [facility] EMERGENCY DEPT Emergency Medicine  As needed, If symptoms worsen [address] [id number]  [phone]        _______________________________      Please note that this dictation was completed with [employer], the computer voice recognition software. Quite often unanticipated grammatical, syntax, homophones, and other interpretive errors are inadvertently transcribed by the computer software. Please disregard these errors. Please excuse any errors that have escaped final proofreading.

## 2022-01-05 NOTE — ED TRIAGE NOTES
Pt arrives ambulatory to ED with c\o SOB, pt sts she was dx with covid 12 days ago, pt with pmhx of asthma, pt denies CP, dizziness, n/v/d

## 2022-01-06 ENCOUNTER — PATIENT OUTREACH (OUTPATIENT)
Dept: CASE MANAGEMENT | Age: 32
End: 2022-01-06

## 2022-01-06 NOTE — ROUTINE PROCESS
Discharge instructions given to patient. Verbalized understanding. My Medications      START taking these medications      Instructions Each Dose to Equal Morning Noon Evening Bedtime   azithromycin 250 mg tablet  Commonly known as: Zithromax Z-Mateo    Your last dose was: Your next dose is:         Use per pack instructions. ASK your doctor about these medications      Instructions Each Dose to Equal Morning Noon Evening Bedtime   albuterol 90 mcg/actuation inhaler  Commonly known as: PROVENTIL HFA, VENTOLIN HFA, PROAIR HFA    Your last dose was: Your next dose is: Take 2 Puffs by inhalation every four (4) hours as needed for Wheezing. 2 Puff                 budesonide-formoteroL 160-4.5 mcg/actuation Hfaa  Commonly known as: Symbicort    Your last dose was: Your next dose is: Take 2 Puffs by inhalation two (2) times a day. 2 Puff                 docusate sodium 100 mg capsule  Commonly known as: Colace    Your last dose was: Your next dose is: Take 1 Capsule by mouth two (2) times a day for 90 days. 100 mg                 ipratropium-albuteroL  mcg/actuation inhaler  Commonly known as: Combivent Respimat    Your last dose was: Your next dose is: Take 1 Puff by inhalation every six (6) hours as needed for Wheezing. 1 Puff                 ondansetron 4 mg disintegrating tablet  Commonly known as: ZOFRAN ODT    Your last dose was: Your next dose is: Take 1 Tablet by mouth every eight (8) hours as needed for Nausea or Vomiting. 4 mg                 pantoprazole 40 mg tablet  Commonly known as: Protonix    Your last dose was: Your next dose is: Take 1 Tablet by mouth daily for 20 days. 40 mg                 polyethylene glycol 17 gram/dose powder  Commonly known as: Miralax    Your last dose was: Your next dose is: Take 17 g by mouth two (2) times a day.  1 tablespoon with 8 oz of water daily   17 g                       Where to Get Your Medications      These medications were sent to [address] [place] [address], [address] [place] [address]    Hours: 24-hours Phone: [phone]   · azithromycin 250 mg tablet       Discussed above medications  Given opportunity to ask questions. Left with all discharge papers.

## 2022-01-06 NOTE — PROGRESS NOTES
Unable to reach    Attempted to contact the patient by telephone. Left message with information to return call. Will attempt to contact the patient at a later time.

## 2022-01-13 ENCOUNTER — PATIENT OUTREACH (OUTPATIENT)
Dept: CASE MANAGEMENT | Age: 32
End: 2022-01-13

## 2023-04-20 NOTE — ED NOTES
Patient states she is feeling better. No further wheezing or coughing noted. Niacinamide Counseling: I recommended taking niacin or niacinamide, also know as vitamin B3, twice daily. Recent evidence suggests that taking vitamin B3 (500 mg twice daily) can reduce the risk of actinic keratoses and non-melanoma skin cancers. Side effects of vitamin B3 include flushing and headache.

## 2024-03-18 NOTE — DISCHARGE SUMMARY
03/18/2024  Ran Reyes Jr. is a 65 y.o., male with CAD status post stent (last Plavix March 17), peripheral artery disease status post peripheral stent, AFib, AAA (3.6 cm--stable), severe MR, COPD admitted March 17th to Internal Medicine for optimization and medical management complaining of testicular swelling and bleeding.  Further workup revealed hydrocele versus scrotal hematoma with non ST-elevation MI secondary to sepsis/UTI with AFib/RVR and COPD exacerbation.  Patient presents today for I&D of scrotal abscess    Transthoracic echo January 2024   EF 41%   Moderate-to-severe MR, mild TR   PA systolic pressure 37    Pre-op Assessment    I have reviewed the Patient Summary Reports.    I have reviewed the NPO Status.   I have reviewed the Medications.     Review of Systems  Anesthesia Hx:               Denies Personal Hx of Anesthesia complications.                    Social:  Smoker       Hematology/Oncology:       -- Anemia:                                  Cardiovascular:     Hypertension    Dysrhythmias atrial fibrillation  CHF         Functional Capacity good / => 4 METS   Coronary Artery Disease:      S/P Percutaneous Coronary Intervention (PCI)         Mitral Regurgitation (MR), moderate, severe                        Pulmonary:   COPD                         Physical Exam  General: Well nourished, Cooperative, Alert and Oriented    Airway:  Mouth Opening: Normal  TM Distance: Normal  Tongue: Normal  Neck ROM: Normal ROM    Dental:  Intact    Chest/Lungs:  Rhonchi, Tachypnea  expiratory wheezes    Heart:  Rate: Tachycardia  Rhythm: Irregularly Irregular        Anesthesia Plan  Type of Anesthesia, risks & benefits discussed:    Anesthesia Type: Gen Supraglottic Airway  Intra-op Monitoring Plan: Standard ASA Monitors  Post Op Pain Control Plan: multimodal analgesia and IV/PO Opioids  Discharge Summary    Patient: Jeff Rodriges MRN: [id number]  CSN: [id number]    YOB: 1990  Age: 26 y.o. Sex: female    DOA: 2/21/2020 LOS:  LOS: 1 day   Discharge Date: 2/22/2020     Primary Care Provider:  Yaquelin Bolton MD    Admission Diagnoses: Asthma [J45.909]    Discharge Diagnoses:    Problem List as of 2/22/2020 Never Reviewed          Codes Class Noted - Resolved    Asthma ICD-10-CM: J45.909  ICD-9-CM: 493.90  2/21/2020 - Present        Bronchitis ICD-10-CM: J40  ICD-9-CM: 626  10/20/2019 - Present        Asthma exacerbation ICD-10-CM: J45.901  ICD-9-CM: 493.92  10/20/2019 - Present              Discharge Medications:     Discharge Medication List as of 2/22/2020  1:58 PM      CONTINUE these medications which have NOT CHANGED    Details   budesonide-formoteroL (SYMBICORT) 160-4.5 mcg/actuation HFAA Take 2 Puffs by inhalation two (2) times a day., Normal, Disp-1 Inhaler, R-0      ipratropium-albuteroL (COMBIVENT RESPIMAT)  mcg/actuation inhaler Take 1 Puff by inhalation every six (6) hours. , Normal, Disp-1 Inhaler, R-0      albuterol-ipratropium (DUO-NEB) 2.5 mg-0.5 mg/3 ml nebu 3 mL by Nebulization route every four (4) hours as needed (sob). , Normal, Disp-30 Nebule, R-1      albuterol (PROVENTIL HFA, VENTOLIN HFA, PROAIR HFA) 90 mcg/actuation inhaler Take 2 Puffs by inhalation every four (4) hours as needed for Wheezing., Normal, Disp-1 Inhaler, R-0      methylPREDNISolone (MEDROL, GHANSHYAM,) 4 mg tablet Use per pack instructions. , Normal, Disp-1 Dose Pack, R-0             Discharge Condition: Good    Procedures : none    Consults: None      PHYSICAL EXAM   Visit Vitals  /80   Pulse 100   Temp 98 °F (36.7 °C)   Resp 16   Ht 5' 5\" (1.651 m)   Wt 86.3 kg (190 lb 4.8 oz)   SpO2 93%   BMI 31.67 kg/m²     General: Awake, cooperative, no acute distress    HEENT: NC, Atraumatic. PERRLA, EOMI. Anicteric sclerae. Lungs:  CTA Bilaterally.  No PRN  Induction:  IV  Airway Plan: Direct  Informed Consent: Informed consent signed with the Patient and all parties understand the risks and agree with anesthesia plan.  All questions answered. Patient consented to blood products? No  ASA Score: 4  Day of Surgery Review of History & Physical: H&P Update referred to the surgeon/provider.  Anesthesia Plan Notes: DuoNeb Tx preop    Ready For Surgery From Anesthesia Perspective.     .       Wheezing/Rhonchi/Rales. Heart:  Regular  rhythm,  No murmur, No Rubs, No Gallops  Abdomen: Soft, Non distended, Non tender. +Bowel sounds,   Extremities: No c/c/e  Psych:   Not anxious or agitated. Neurologic:  No acute neurological deficits. Admission HPI :   Gabrielle Paul is a 26 y.o. female who past medical history of asthma and migraine headaches and past medical history of a pulmonary embolism in lower right lobe is not taking any anticoagulation at this time. She takes Symbicort, Combivent and DuoNebs for asthma. She has never been under the care of a pulmonologist.  This year she has had 1 admission for asthma exacerbation and 2-3 ER visits. In 2019, last year she had greater than 10 ER visits for asthma and one admission. She has never been intubated. Does not mine being intubated but would like for that to be the last resort. She feels like her asthma was triggered this time by her significant other's recent upper respiratory infection which she thinks she caught. When she presented to the emergency room she was in severe respiratory distress and tented to the emergency room with an oxygen saturation of 88% on room air. She was experiencing pursed lip breathing, wheeze work of breathing, tachypnea and tripoding. Has had a cough with productive sputum yellowish. Denies fever, chills or night sweats. Continues to smoke cigarettes and has smoked since she was 11years old. Only smoking 1/3-1/2 of pack a day, has not smoked in the last 2 days. Onset of her symptoms was 2 days ago and consisted of shortness of breath difficulty breathing, wheezing, increased work of breathing, difficulty talking and anxiousness. Hospital Course :   Ms. Izabella Christine was admitted to monitored floor, she did not had any acute events during hospitalization. For her asthma exacerbation she was started on Solu-Medrol, inhaled bronchodilators, inhaled Pulmicort and empiric antibiotics. With the above measures her significant symptoms improved significantly and she is feeling much better. She has ambulated down the pratt without any difficulty. She is eager to go home, we will discharge her on prednisone taper dose, she will continue with her home inhalers. Advised her to follow-up with her primary care physician. Her urine drug screen positive for THC and she is strongly advised to quit smoking. Activity: Activity as tolerated    Diet: Regular Diet    Follow-up: PCP    Disposition: home    Minutes spent on discharge: 45       Labs: Results:       Chemistry Recent Labs     02/22/20  0305 02/21/20  1215   * 82    138   K 5.0 4.1    107   CO2 23 23   BUN 12 14   CREA 0.61 0.70   CA 8.6 8.9   AGAP 9 8   BUCR 20 20      CBC w/Diff Recent Labs     02/22/20 0305 02/21/20  1215   WBC 11.4 12.9   RBC 5.19 5.21   HGB 16.0 16.2*   HCT 46.2* 46.8*    237   GRANS  --  65   LYMPH  --  23   EOS  --  4      Cardiac Enzymes No results for input(s): CPK, CKND1, SULAIMAN in the last 72 hours. No lab exists for component: CKRMB, TROIP   Coagulation No results for input(s): PTP, INR, APTT, INREXT in the last 72 hours. Lipid Panel No results found for: CHOL, CHOLPOCT, CHOLX, CHLST, CHOLV, 736127, HDL, HDLP, LDL, LDLC, DLDLP, 073110, VLDLC, VLDL, TGLX, TRIGL, TRIGP, TGLPOCT, CHHD, CHHDX   BNP No results for input(s): BNPP in the last 72 hours. Liver Enzymes No results for input(s): TP, ALB, TBIL, AP, SGOT, GPT in the last 72 hours. No lab exists for component: DBIL   Thyroid Studies No results found for: T4, T3U, TSH, TSHEXT         Significant Diagnostic Studies: Xr Chest Port    Result Date: 2/21/2020  EXAM: XR CHEST PORT CLINICAL INDICATION/HISTORY: severe asthma attack -Additional: None COMPARISON: 10/20/2019 TECHNIQUE: Frontal view of the chest _______________ FINDINGS: HEART AND MEDIASTINUM: Normal cardiac size and mediastinal contours.  No pneumomediastinum evident radiographically. LUNGS AND PLEURAL SPACES: No focal pneumonic consolidation, pneumothorax, or pleural effusion. BONY THORAX AND SOFT TISSUES: No acute osseous abnormality _______________     IMPRESSION: No acute radiographic cardiopulmonary abnormality. No results found for this or any previous visit. Please note that this dictation was completed with [employer], the computer voice recognition software. Quite often unanticipated grammatical, syntax, homophones, and other interpretive errors are inadvertently transcribed by the computer software. Please disregard these errors. Please excuse any errors that have escaped final proofreading.      CC: Bg Phillips MD